# Patient Record
Sex: FEMALE | Race: WHITE | NOT HISPANIC OR LATINO | Employment: OTHER | URBAN - METROPOLITAN AREA
[De-identification: names, ages, dates, MRNs, and addresses within clinical notes are randomized per-mention and may not be internally consistent; named-entity substitution may affect disease eponyms.]

---

## 2019-02-18 PROBLEM — I50.9 ACUTE HEART FAILURE: Status: ACTIVE | Noted: 2019-02-18

## 2019-02-19 ENCOUNTER — HOSPITAL ENCOUNTER (INPATIENT)
Facility: HOSPITAL | Age: 79
LOS: 17 days | Discharge: HOME HEALTH | DRG: 291 | End: 2019-03-08
Attending: INTERNAL MEDICINE | Admitting: INTERNAL MEDICINE
Payer: MEDICARE

## 2019-02-19 DIAGNOSIS — R00.1 BRADYCARDIA: ICD-10-CM

## 2019-02-19 DIAGNOSIS — I50.33 ACUTE ON CHRONIC DIASTOLIC CONGESTIVE HEART FAILURE, NYHA CLASS 3: ICD-10-CM

## 2019-02-19 DIAGNOSIS — R09.02 HYPOXIA: ICD-10-CM

## 2019-02-19 DIAGNOSIS — J96.01 ACUTE RESPIRATORY FAILURE WITH HYPOXIA AND HYPERCARBIA: ICD-10-CM

## 2019-02-19 DIAGNOSIS — J96.21 ACUTE ON CHRONIC RESPIRATORY FAILURE WITH HYPOXIA: ICD-10-CM

## 2019-02-19 DIAGNOSIS — J96.02 ACUTE RESPIRATORY FAILURE WITH HYPOXIA AND HYPERCARBIA: ICD-10-CM

## 2019-02-19 DIAGNOSIS — R07.89 CHEST TIGHTNESS: ICD-10-CM

## 2019-02-19 DIAGNOSIS — I50.9 ACUTE HEART FAILURE: ICD-10-CM

## 2019-02-19 DIAGNOSIS — I50.9 CHF (CONGESTIVE HEART FAILURE): ICD-10-CM

## 2019-02-19 DIAGNOSIS — R58 BLEEDING: ICD-10-CM

## 2019-02-19 DIAGNOSIS — R00.0 TACHYARRHYTHMIA: ICD-10-CM

## 2019-02-19 DIAGNOSIS — M19.90 ARTHRITIS: ICD-10-CM

## 2019-02-19 DIAGNOSIS — I42.1 HOCM (HYPERTROPHIC OBSTRUCTIVE CARDIOMYOPATHY): Primary | ICD-10-CM

## 2019-02-19 DIAGNOSIS — G47.33 OSA ON CPAP: ICD-10-CM

## 2019-02-19 DIAGNOSIS — I27.20 PULMONARY HTN: ICD-10-CM

## 2019-02-19 PROBLEM — J90 PLEURAL EFFUSION, BILATERAL: Status: ACTIVE | Noted: 2019-02-19

## 2019-02-19 PROBLEM — J18.9 PNA (PNEUMONIA): Status: ACTIVE | Noted: 2019-02-19

## 2019-02-19 PROBLEM — I27.82 CHRONIC PULMONARY EMBOLISM: Status: ACTIVE | Noted: 2019-02-19

## 2019-02-19 PROBLEM — E11.9 TYPE 2 DIABETES MELLITUS: Status: ACTIVE | Noted: 2019-02-19

## 2019-02-19 PROBLEM — E03.9 HYPOTHYROIDISM: Status: ACTIVE | Noted: 2019-02-19

## 2019-02-19 PROBLEM — J96.20 ACUTE ON CHRONIC RESPIRATORY FAILURE: Status: ACTIVE | Noted: 2019-02-19

## 2019-02-19 LAB
ALBUMIN SERPL BCP-MCNC: 3.1 G/DL
ALLENS TEST: ABNORMAL
ALP SERPL-CCNC: 76 U/L
ALT SERPL W/O P-5'-P-CCNC: 16 U/L
ANION GAP SERPL CALC-SCNC: 7 MMOL/L
ASCENDING AORTA: 3.09 CM
AST SERPL-CCNC: 12 U/L
BASOPHILS # BLD AUTO: 0.06 K/UL
BASOPHILS NFR BLD: 0.6 %
BILIRUB SERPL-MCNC: 0.4 MG/DL
BNP SERPL-MCNC: 494 PG/ML
BSA FOR ECHO PROCEDURE: 1.97 M2
BUN SERPL-MCNC: 28 MG/DL
CALCIUM SERPL-MCNC: 9.8 MG/DL
CHLORIDE SERPL-SCNC: 105 MMOL/L
CO2 SERPL-SCNC: 25 MMOL/L
CREAT SERPL-MCNC: 0.9 MG/DL
CV ECHO LV RWT: 0.46 CM
DELSYS: ABNORMAL
DIFFERENTIAL METHOD: ABNORMAL
DOP CALC LVOT AREA: 3.46 CM2
DOP CALC LVOT DIAMETER: 2.1 CM
E WAVE DECELERATION TIME: 229.34 MSEC
E/A RATIO: 1.46
E/E' RATIO: 28.31
ECHO LV POSTERIOR WALL: 1.07 CM (ref 0.6–1.1)
EOSINOPHIL # BLD AUTO: 0.4 K/UL
EOSINOPHIL NFR BLD: 3.5 %
ERYTHROCYTE [DISTWIDTH] IN BLOOD BY AUTOMATED COUNT: 16.2 %
EST. GFR  (AFRICAN AMERICAN): >60 ML/MIN/1.73 M^2
EST. GFR  (NON AFRICAN AMERICAN): >60 ML/MIN/1.73 M^2
ESTIMATED AVG GLUCOSE: 114 MG/DL
FRACTIONAL SHORTENING: 29 % (ref 28–44)
GLUCOSE SERPL-MCNC: 119 MG/DL
HBA1C MFR BLD HPLC: 5.6 %
HCO3 UR-SCNC: 22.8 MMOL/L (ref 24–28)
HCT VFR BLD AUTO: 28.5 %
HGB BLD-MCNC: 8.4 G/DL
IMM GRANULOCYTES # BLD AUTO: 0.04 K/UL
IMM GRANULOCYTES NFR BLD AUTO: 0.4 %
INTERVENTRICULAR SEPTUM: 2.26 CM (ref 0.6–1.1)
LA MAJOR: 5.1 CM
LA MINOR: 5.08 CM
LA WIDTH: 5.07 CM
LACTATE SERPL-SCNC: 1.4 MMOL/L
LEFT ATRIUM SIZE: 6.26 CM
LEFT ATRIUM VOLUME INDEX: 71.6 ML/M2
LEFT ATRIUM VOLUME: 137.31 CM3
LEFT INTERNAL DIMENSION IN SYSTOLE: 3.3 CM (ref 2.1–4)
LEFT VENTRICLE DIASTOLIC VOLUME INDEX: 51.66 ML/M2
LEFT VENTRICLE DIASTOLIC VOLUME: 99.09 ML
LEFT VENTRICLE MASS INDEX: 176.6 G/M2
LEFT VENTRICLE SYSTOLIC VOLUME INDEX: 23 ML/M2
LEFT VENTRICLE SYSTOLIC VOLUME: 44.14 ML
LEFT VENTRICULAR INTERNAL DIMENSION IN DIASTOLE: 4.64 CM (ref 3.5–6)
LEFT VENTRICULAR MASS: 338.7 G
LV LATERAL E/E' RATIO: 23
LV SEPTAL E/E' RATIO: 36.8
LYMPHOCYTES # BLD AUTO: 0.9 K/UL
LYMPHOCYTES NFR BLD: 9.3 %
MAGNESIUM SERPL-MCNC: 2.2 MG/DL
MCH RBC QN AUTO: 28.3 PG
MCHC RBC AUTO-ENTMCNC: 29.5 G/DL
MCV RBC AUTO: 96 FL
MONOCYTES # BLD AUTO: 0.9 K/UL
MONOCYTES NFR BLD: 9 %
MV PEAK A VEL: 1.26 M/S
MV PEAK E VEL: 1.84 M/S
NEUTROPHILS # BLD AUTO: 7.8 K/UL
NEUTROPHILS NFR BLD: 77.2 %
NRBC BLD-RTO: 0 /100 WBC
PCO2 BLDA: 32.3 MMHG (ref 35–45)
PH SMN: 7.46 [PH] (ref 7.35–7.45)
PISA TR MAX VEL: 3.19 M/S
PLATELET # BLD AUTO: 240 K/UL
PMV BLD AUTO: 10.9 FL
PO2 BLDA: 111 MMHG (ref 80–100)
POC BE: -1 MMOL/L
POC SATURATED O2: 99 % (ref 95–100)
POC TCO2: 24 MMOL/L (ref 23–27)
POCT GLUCOSE: 168 MG/DL (ref 70–110)
POCT GLUCOSE: 180 MG/DL (ref 70–110)
POTASSIUM SERPL-SCNC: 4.6 MMOL/L
PROT SERPL-MCNC: 6.8 G/DL
PULM VEIN S/D RATIO: 2.1
PV PEAK D VEL: 0.21 M/S
PV PEAK S VEL: 0.44 M/S
RA MAJOR: 4.35 CM
RA PRESSURE: 8 MMHG
RA WIDTH: 3.47 CM
RBC # BLD AUTO: 2.97 M/UL
RIGHT VENTRICULAR END-DIASTOLIC DIMENSION: 3.56 CM
RV TISSUE DOPPLER FREE WALL SYSTOLIC VELOCITY 1 (APICAL 4 CHAMBER VIEW): 16.3 M/S
SAMPLE: ABNORMAL
SINUS: 3.08 CM
SITE: ABNORMAL
SODIUM SERPL-SCNC: 137 MMOL/L
STJ: 2.88 CM
TDI LATERAL: 0.08
TDI SEPTAL: 0.05
TDI: 0.07
TR MAX PG: 40.7 MMHG
TRICUSPID ANNULAR PLANE SYSTOLIC EXCURSION: 1.98 CM
TROPONIN I SERPL DL<=0.01 NG/ML-MCNC: 0.07 NG/ML
TV REST PULMONARY ARTERY PRESSURE: 49 MMHG
WBC # BLD AUTO: 10.11 K/UL

## 2019-02-19 PROCEDURE — 93010 ELECTROCARDIOGRAM REPORT: CPT | Mod: ,,, | Performed by: INTERNAL MEDICINE

## 2019-02-19 PROCEDURE — 27000221 HC OXYGEN, UP TO 24 HOURS

## 2019-02-19 PROCEDURE — 94761 N-INVAS EAR/PLS OXIMETRY MLT: CPT

## 2019-02-19 PROCEDURE — 25000003 PHARM REV CODE 250: Performed by: STUDENT IN AN ORGANIZED HEALTH CARE EDUCATION/TRAINING PROGRAM

## 2019-02-19 PROCEDURE — 83880 ASSAY OF NATRIURETIC PEPTIDE: CPT

## 2019-02-19 PROCEDURE — 25000003 PHARM REV CODE 250: Performed by: INTERNAL MEDICINE

## 2019-02-19 PROCEDURE — 83605 ASSAY OF LACTIC ACID: CPT

## 2019-02-19 PROCEDURE — 99223 1ST HOSP IP/OBS HIGH 75: CPT | Mod: AI,GC,, | Performed by: INTERNAL MEDICINE

## 2019-02-19 PROCEDURE — 36415 COLL VENOUS BLD VENIPUNCTURE: CPT

## 2019-02-19 PROCEDURE — 93005 ELECTROCARDIOGRAM TRACING: CPT

## 2019-02-19 PROCEDURE — 99223 PR INITIAL HOSPITAL CARE,LEVL III: ICD-10-PCS | Mod: AI,GC,, | Performed by: INTERNAL MEDICINE

## 2019-02-19 PROCEDURE — 63600175 PHARM REV CODE 636 W HCPCS: Performed by: STUDENT IN AN ORGANIZED HEALTH CARE EDUCATION/TRAINING PROGRAM

## 2019-02-19 PROCEDURE — 83735 ASSAY OF MAGNESIUM: CPT

## 2019-02-19 PROCEDURE — 25000242 PHARM REV CODE 250 ALT 637 W/ HCPCS: Performed by: STUDENT IN AN ORGANIZED HEALTH CARE EDUCATION/TRAINING PROGRAM

## 2019-02-19 PROCEDURE — 93010 EKG 12-LEAD: ICD-10-PCS | Mod: ,,, | Performed by: INTERNAL MEDICINE

## 2019-02-19 PROCEDURE — 36600 WITHDRAWAL OF ARTERIAL BLOOD: CPT

## 2019-02-19 PROCEDURE — 84484 ASSAY OF TROPONIN QUANT: CPT

## 2019-02-19 PROCEDURE — 99900035 HC TECH TIME PER 15 MIN (STAT)

## 2019-02-19 PROCEDURE — 11000001 HC ACUTE MED/SURG PRIVATE ROOM

## 2019-02-19 PROCEDURE — 83036 HEMOGLOBIN GLYCOSYLATED A1C: CPT

## 2019-02-19 PROCEDURE — 94660 CPAP INITIATION&MGMT: CPT

## 2019-02-19 PROCEDURE — 80053 COMPREHEN METABOLIC PANEL: CPT

## 2019-02-19 PROCEDURE — 94640 AIRWAY INHALATION TREATMENT: CPT

## 2019-02-19 PROCEDURE — 82803 BLOOD GASES ANY COMBINATION: CPT

## 2019-02-19 PROCEDURE — 85025 COMPLETE CBC W/AUTO DIFF WBC: CPT

## 2019-02-19 RX ORDER — ALPRAZOLAM 0.5 MG/1
0.5 TABLET ORAL NIGHTLY PRN
Status: ON HOLD | COMMUNITY
End: 2019-03-08 | Stop reason: SDUPTHER

## 2019-02-19 RX ORDER — FUROSEMIDE 40 MG/1
40 TABLET ORAL 2 TIMES DAILY
COMMUNITY

## 2019-02-19 RX ORDER — LEVOTHYROXINE SODIUM 112 UG/1
112 TABLET ORAL
Status: DISCONTINUED | OUTPATIENT
Start: 2019-02-20 | End: 2019-03-08 | Stop reason: HOSPADM

## 2019-02-19 RX ORDER — IBUPROFEN 200 MG
24 TABLET ORAL
Status: DISCONTINUED | OUTPATIENT
Start: 2019-02-19 | End: 2019-03-08 | Stop reason: HOSPADM

## 2019-02-19 RX ORDER — FLUTICASONE FUROATE AND VILANTEROL 200; 25 UG/1; UG/1
1 POWDER RESPIRATORY (INHALATION) DAILY
Status: DISCONTINUED | OUTPATIENT
Start: 2019-02-20 | End: 2019-03-08 | Stop reason: HOSPADM

## 2019-02-19 RX ORDER — ALPRAZOLAM 0.5 MG/1
0.5 TABLET ORAL NIGHTLY PRN
Status: DISCONTINUED | OUTPATIENT
Start: 2019-02-19 | End: 2019-02-23

## 2019-02-19 RX ORDER — MONTELUKAST SODIUM 10 MG/1
10 TABLET ORAL NIGHTLY
Status: DISCONTINUED | OUTPATIENT
Start: 2019-02-19 | End: 2019-03-08 | Stop reason: HOSPADM

## 2019-02-19 RX ORDER — HYDROCODONE BITARTRATE AND ACETAMINOPHEN 5; 325 MG/1; MG/1
1 TABLET ORAL DAILY
Status: DISCONTINUED | OUTPATIENT
Start: 2019-02-19 | End: 2019-02-19

## 2019-02-19 RX ORDER — ENOXAPARIN SODIUM 100 MG/ML
1 INJECTION SUBCUTANEOUS
Status: DISCONTINUED | OUTPATIENT
Start: 2019-02-19 | End: 2019-02-21

## 2019-02-19 RX ORDER — GLUCAGON 1 MG
1 KIT INJECTION
Status: DISCONTINUED | OUTPATIENT
Start: 2019-02-19 | End: 2019-03-08 | Stop reason: HOSPADM

## 2019-02-19 RX ORDER — BUDESONIDE 0.5 MG/2ML
0.5 INHALANT ORAL EVERY 12 HOURS
Status: DISCONTINUED | OUTPATIENT
Start: 2019-02-19 | End: 2019-02-19

## 2019-02-19 RX ORDER — HYDROCODONE BITARTRATE AND ACETAMINOPHEN 5; 325 MG/1; MG/1
1 TABLET ORAL 2 TIMES DAILY PRN
Status: DISCONTINUED | OUTPATIENT
Start: 2019-02-19 | End: 2019-02-19

## 2019-02-19 RX ORDER — FLUTICASONE PROPIONATE 50 MCG
1 SPRAY, SUSPENSION (ML) NASAL DAILY
Status: DISCONTINUED | OUTPATIENT
Start: 2019-02-19 | End: 2019-03-08 | Stop reason: HOSPADM

## 2019-02-19 RX ORDER — TIOTROPIUM BROMIDE 18 UG/1
1 CAPSULE ORAL; RESPIRATORY (INHALATION) DAILY
Status: DISCONTINUED | OUTPATIENT
Start: 2019-02-19 | End: 2019-03-08 | Stop reason: HOSPADM

## 2019-02-19 RX ORDER — INSULIN ASPART 100 [IU]/ML
0-5 INJECTION, SOLUTION INTRAVENOUS; SUBCUTANEOUS
Status: DISCONTINUED | OUTPATIENT
Start: 2019-02-19 | End: 2019-03-08 | Stop reason: HOSPADM

## 2019-02-19 RX ORDER — VERAPAMIL HYDROCHLORIDE 40 MG/1
40 TABLET ORAL 3 TIMES DAILY
Status: DISCONTINUED | OUTPATIENT
Start: 2019-02-19 | End: 2019-02-22

## 2019-02-19 RX ORDER — IPRATROPIUM BROMIDE AND ALBUTEROL SULFATE 2.5; .5 MG/3ML; MG/3ML
3 SOLUTION RESPIRATORY (INHALATION) EVERY 6 HOURS
Status: DISCONTINUED | OUTPATIENT
Start: 2019-02-19 | End: 2019-02-28

## 2019-02-19 RX ORDER — HYDROCODONE BITARTRATE AND ACETAMINOPHEN 5; 325 MG/1; MG/1
2 TABLET ORAL 2 TIMES DAILY PRN
Status: DISCONTINUED | OUTPATIENT
Start: 2019-02-19 | End: 2019-02-20

## 2019-02-19 RX ORDER — METOPROLOL TARTRATE 50 MG/1
50 TABLET ORAL 2 TIMES DAILY
Status: COMPLETED | OUTPATIENT
Start: 2019-02-19 | End: 2019-02-27

## 2019-02-19 RX ORDER — GABAPENTIN 100 MG/1
100 CAPSULE ORAL 2 TIMES DAILY
Status: DISCONTINUED | OUTPATIENT
Start: 2019-02-19 | End: 2019-03-08 | Stop reason: HOSPADM

## 2019-02-19 RX ORDER — IBUPROFEN 200 MG
16 TABLET ORAL
Status: DISCONTINUED | OUTPATIENT
Start: 2019-02-19 | End: 2019-03-08 | Stop reason: HOSPADM

## 2019-02-19 RX ADMIN — IPRATROPIUM BROMIDE AND ALBUTEROL SULFATE 3 ML: .5; 3 SOLUTION RESPIRATORY (INHALATION) at 11:02

## 2019-02-19 RX ADMIN — MONTELUKAST SODIUM 10 MG: 10 TABLET, FILM COATED ORAL at 09:02

## 2019-02-19 RX ADMIN — VERAPAMIL HYDROCHLORIDE 40 MG: 40 TABLET ORAL at 09:02

## 2019-02-19 RX ADMIN — TIOTROPIUM BROMIDE 18 MCG: 18 CAPSULE ORAL; RESPIRATORY (INHALATION) at 06:02

## 2019-02-19 RX ADMIN — IPRATROPIUM BROMIDE AND ALBUTEROL SULFATE 3 ML: .5; 3 SOLUTION RESPIRATORY (INHALATION) at 04:02

## 2019-02-19 RX ADMIN — ENOXAPARIN SODIUM 90 MG: 100 INJECTION SUBCUTANEOUS at 06:02

## 2019-02-19 RX ADMIN — HYDROCODONE BITARTRATE AND ACETAMINOPHEN 1 TABLET: 5; 325 TABLET ORAL at 03:02

## 2019-02-19 RX ADMIN — METOPROLOL TARTRATE 50 MG: 50 TABLET ORAL at 10:02

## 2019-02-19 RX ADMIN — FLUTICASONE PROPIONATE 50 MCG: 50 SPRAY, METERED NASAL at 03:02

## 2019-02-19 RX ADMIN — GABAPENTIN 100 MG: 100 CAPSULE ORAL at 09:02

## 2019-02-19 RX ADMIN — ALPRAZOLAM 0.5 MG: 0.5 TABLET ORAL at 09:02

## 2019-02-19 NOTE — ASSESSMENT & PLAN NOTE
· Hypertrophic cardiomyopathy with asymmetric septal hypertrophy  · Systolic anterior motion of the mitral valve with severe outflow tract left ventricular obstruction. The peak gradient is near 100mm Hg.      - start BB and CCB  - will need outpatient ablation

## 2019-02-19 NOTE — ASSESSMENT & PLAN NOTE
pulmonary artery systolic pressure (PASP)= 49  Mean PAP can be approximated because mPAP = 0.61sPAP + 2  Mean PAP= 31.89 (32)    MILD Pulmonary HTN

## 2019-02-19 NOTE — SUBJECTIVE & OBJECTIVE
Past Medical History:   Diagnosis Date    Anemia     Anxiety     Arthritis     Asthma     Atrial fibrillation     Cancer     Cataract     Clotting disorder     COPD (chronic obstructive pulmonary disease)     Coronary artery disease     Degenerative disc disease at L5-S1 level     Depression     Glaucoma     Heart murmur     History of stomach ulcers     Hypertension     Myocardial infarction     Neuromuscular disorder     Thyroid disease     Tuberculosis        Past Surgical History:   Procedure Laterality Date    CHOLECYSTECTOMY      COLON SURGERY      EYE SURGERY      TONSILLECTOMY         Review of patient's allergies indicates:   Allergen Reactions    Augmentin [amoxicillin-pot clavulanate]     Cosopt [dorzolamide-timolol]     Cymbalta [duloxetine]     Isordil [isosorbide dinitrate]     Procardia [nifedipine]        No current facility-administered medications on file prior to encounter.      Current Outpatient Medications on File Prior to Encounter   Medication Sig    albuterol 90 mcg/actuation inhaler Inhale 2 puffs into the lungs every 6 (six) hours as needed for Wheezing.    albuterol-ipratropium 2.5mg-0.5mg/3mL (DUO-NEB) 0.5 mg-3 mg(2.5 mg base)/3 mL nebulizer solution Take 3 mLs by nebulization every 6 (six) hours as needed for Wheezing.    ALPRAZolam (XANAX) 0.5 MG tablet Take 0.5 mg by mouth nightly as needed for Insomnia or Anxiety (SOB/anxiety/rest at night).    azilsartan medoxomil 80 mg Tab Take by mouth.    furosemide (LASIX) 40 MG tablet Take 40 mg by mouth 2 (two) times daily.    gabapentin (NEURONTIN) 100 MG capsule Take 100 mg by mouth 2 (two) times daily.    hydrocodone-acetaminophen 10-325mg (NORCO)  mg Tab Take by mouth every 6 (six) hours.    iron-vitamin C 100-250 mg, ICAR-C, 100-250 mg Tab Take by mouth.    levothyroxine (SYNTHROID) 112 MCG tablet Take 112 mcg by mouth once daily.    lidocaine (LIDODERM) 5 %(700 mg/patch) Place 1 patch onto the  skin every 24 hours. Remove & Discard patch within 12 hours or as directed by MD    metformin (GLUCOPHAGE) 1000 MG tablet Take 1,000 mg by mouth 2 (two) times daily with meals.    metoprolol tartrate (LOPRESSOR) 50 MG tablet Take 50 mg by mouth 2 (two) times daily.    montelukast (SINGULAIR) 10 mg tablet Take 10 mg by mouth every evening.    potassium chloride (KLOR-CON) 20 mEq Pack Take 20 mEq by mouth 2 (two) times daily.    senna-docusate 8.6-50 mg (PERICOLACE) 8.6-50 mg per tablet Take 1 tablet by mouth once daily.    chlorthalidone (HYGROTEN) 25 MG Tab Take 25 mg by mouth once daily.    diltiazem (CARDIZEM) 30 MG tablet Take 30 mg by mouth 4 (four) times daily.    doxycycline (VIBRA-TABS) 100 MG tablet Take 100 mg by mouth 2 (two) times daily.    fluticasone (FLONASE) 50 mcg/actuation nasal spray 1 spray by Each Nare route once daily.    venlafaxine (EFFEXOR-XR) 37.5 MG 24 hr capsule Take 37.5 mg by mouth once daily.     Family History     None        Tobacco Use    Smoking status: Former Smoker     Last attempt to quit: 1/14/2016     Years since quitting: 3.1   Substance and Sexual Activity    Alcohol use: No     Alcohol/week: 0.0 oz    Drug use: No    Sexual activity: Not on file     Review of Systems   Constitution: Positive for malaise/fatigue. Negative for chills, fever and night sweats.   HENT: Positive for nosebleeds. Negative for hoarse voice.    Eyes: Negative for visual disturbance and visual halos.   Cardiovascular: Positive for chest pain (chest tightness ) and dyspnea on exertion (and at rest). Negative for syncope.   Respiratory: Positive for cough, shortness of breath and wheezing. Negative for hemoptysis (resolved ) and sputum production.    Endocrine: Negative for polydipsia, polyphagia and polyuria.   Hematologic/Lymphatic: Positive for bleeding problem. Does not bruise/bleed easily.   Musculoskeletal: Negative for joint pain and joint swelling.   Gastrointestinal: Negative for  abdominal pain, constipation, diarrhea, melena, nausea and vomiting.   Genitourinary: Negative for flank pain and hematuria.   Neurological: Negative for numbness and seizures.   Psychiatric/Behavioral: Negative for memory loss. The patient does not have insomnia.      Objective:     Vital Signs (Most Recent):  Temp: 97.7 °F (36.5 °C) (02/19/19 0848)  Pulse: 91 (02/19/19 1318)  Resp: 20 (02/19/19 1318)  BP: 130/72 (02/19/19 1318)  SpO2: 99 % (02/19/19 1318) Vital Signs (24h Range):  Temp:  [97.7 °F (36.5 °C)-99.1 °F (37.3 °C)] 97.7 °F (36.5 °C)  Pulse:  [66-91] 91  Resp:  [14-22] 20  SpO2:  [98 %-100 %] 99 %  BP: (112-131)/(45-72) 130/72     Weight: 92.5 kg (204 lb)  Body mass index is 33.95 kg/m².    SpO2: 99 %  O2 Device (Oxygen Therapy): nasal cannula    No intake or output data in the 24 hours ending 02/19/19 1438    Lines/Drains/Airways          None          Physical Exam   Constitutional: She is oriented to person, place, and time. She has a sickly appearance.   Patient not in respiratory distress. However, develops SOB w/ minimal movement in bed.    HENT:   Head: Normocephalic and atraumatic.   Eyes: Conjunctivae are normal. Pupils are equal, round, and reactive to light.   Neck: Neck supple. No JVD present.   Cardiovascular: Normal rate.   Murmur heard.  Pulmonary/Chest: Effort normal. No respiratory distress. She has decreased breath sounds in the right middle field and the right lower field. She has no wheezes.   Abdominal: Soft. Bowel sounds are normal. There is no tenderness. There is no rebound.   Musculoskeletal: She exhibits no edema or deformity.   Neurological: She is alert and oriented to person, place, and time.   Skin: Skin is warm. No erythema.       Significant Labs: All pertinent lab results from the last 24 hours have been reviewed.    Significant Imaging: reviewed

## 2019-02-19 NOTE — ASSESSMENT & PLAN NOTE
Echo 2/19/2019  · Hypertrophic cardiomyopathy with asymmetric septal hypertrophy  · Normal left ventricular systolic function. The estimated ejection fraction is 65%  · Normal right ventricular systolic function.  · Severe left atrial enlargement.  · Systolic anterior motion of the mitral valve with severe outflow tract left ventricular obstruction. The peak gradient is near 100mm Hg.  · The estimated PA systolic pressure is 49 mm Hg  · Intermediate central venous pressure (8 mm Hg).    - will hold off on diuresis; pt is preload dependent   - start BB and CCB

## 2019-02-19 NOTE — PROGRESS NOTES
Pt arrived to room from [facility] in [place].  Pagened on call with Cardiology to notify of pt's arrival.  No orders in place at this time.  VSS, afebrile an no c/o pain except for some soreness to sacrum from sitting on ambulance stretcher for the long ride.  Wcm.

## 2019-02-19 NOTE — HPI
Ms. 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     Per OSH records:  Pt presented on 2/5/29 via EMS for respiratory distress despite supplemental oxygen. Pt reports increased SOB over the last 4-5 days. On the day of admission significant SOB despite inhale and changing from oxygen compressor to tank. SpO2 75% on 3.5 L at home. Triage noted patient's SpO2 69% on 3L NC. Patient was placed on BiPaP w/ improvement in her respiratory function. CTA chest 2/7/19: without evidence of PE; moderate bilateral pleural effusions; RML consolidation. Thoracentesis 2/9/19: consistent with transudate. (thoracentesis 12/18 negative cytology). Patient was treated w/ antibiotics. Echo December 2018, IHSS, elevated peak gradient across LVOT at 179mmHg and a mean of 94mmHg. FELICIA performed. Patient remained in the hospital till 2/19/19; during which she was diuresed ~15L, started on BB yet remained symptomatic. And decision was made to transfer patient to [facility].

## 2019-02-19 NOTE — ASSESSMENT & PLAN NOTE
Patient acute respiratory failure is multifactorial secondary to: moderate COPD, pulmonary HTN (PA 49), HOCM, PE, MARISOL (CPAP at 4), bilateral pulmonary effusion, hx of RUL cancer s/p chemo&XRT, recently tx for CAP zosyn x 12 days     - treated for PNA at OSH, no signs of infection. No further txt required   - per OSH records patient not tolerating CPAP at night, would get hypoxic and was switched over to BiPAP  - will continue BiPAP 12/8, and obtain ABG in the morning; expecting patient to be d/c on BiPAP

## 2019-02-19 NOTE — H&P
[facility]  Cardiology  History and Physical     Patient Name: Makenzie Leija  MRN: [id number]  Admission Date: 2/19/2019  Code Status: Full Code   Attending Provider: Paramjit Bales MD   Primary Care Physician: Karthik Roth MD  Principal Problem:Acute on chronic respiratory failure    Patient information was obtained from patient and past medical records.     Subjective:     Chief Complaint:  HOCM & Acute on Chronic Resp Distress      HPI:  Ms. 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     Per OSH records:  Pt presented on 2/5/29 via EMS for respiratory distress despite supplemental oxygen. Pt reports increased SOB over the last 4-5 days. On the day of admission significant SOB despite inhale and changing from oxygen compressor to tank. SpO2 75% on 3.5 L at home. Triage noted patient's SpO2 69% on 3L NC. Patient was placed on BiPaP w/ improvement in her respiratory function. CTA chest 2/7/19: without evidence of PE; moderate bilateral pleural effusions; RML consolidation. Thoracentesis 2/9/19: consistent with transudate. (thoracentesis 12/18 negative cytology). Patient was treated w/ antibiotics. Echo December 2018, IHSS, elevated peak gradient across LVOT at 179mmHg and a mean of 94mmHg. FELICIA performed. Patient remained in the hospital till 2/19/19; during which she was diuresed ~15L, started on BB yet remained symptomatic. And decision was made to transfer patient [place] [facility].         Past Medical History:   Diagnosis Date    Anemia     Anxiety     Arthritis      Asthma     Atrial fibrillation     Cancer     Cataract     Clotting disorder     COPD (chronic obstructive pulmonary disease)     Coronary artery disease     Degenerative disc disease at L5-S1 level     Depression     Glaucoma     Heart murmur     History of stomach ulcers     Hypertension     Myocardial infarction     Neuromuscular disorder     Thyroid disease     Tuberculosis        Past Surgical History:   Procedure Laterality Date    CHOLECYSTECTOMY      COLON SURGERY      EYE SURGERY      TONSILLECTOMY         Review of patient's allergies indicates:   Allergen Reactions    Augmentin [amoxicillin-pot clavulanate]     Cosopt [dorzolamide-timolol]     Cymbalta [duloxetine]     Isordil [isosorbide dinitrate]     Procardia [nifedipine]        No current facility-administered medications on file prior to encounter.      Current Outpatient Medications on File Prior to Encounter   Medication Sig    albuterol 90 mcg/actuation inhaler Inhale 2 puffs into the lungs every 6 (six) hours as needed for Wheezing.    albuterol-ipratropium 2.5mg-0.5mg/3mL (DUO-NEB) 0.5 mg-3 mg(2.5 mg base)/3 mL nebulizer solution Take 3 mLs by nebulization every 6 (six) hours as needed for Wheezing.    ALPRAZolam (XANAX) 0.5 MG tablet Take 0.5 mg by mouth nightly as needed for Insomnia or Anxiety (SOB/anxiety/rest at night).    azilsartan medoxomil 80 mg Tab Take by mouth.    furosemide (LASIX) 40 MG tablet Take 40 mg by mouth 2 (two) times daily.    gabapentin (NEURONTIN) 100 MG capsule Take 100 mg by mouth 2 (two) times daily.    hydrocodone-acetaminophen 10-325mg (NORCO)  mg Tab Take by mouth every 6 (six) hours.    iron-vitamin C 100-250 mg, ICAR-C, 100-250 mg Tab Take by mouth.    levothyroxine (SYNTHROID) 112 MCG tablet Take 112 mcg by mouth once daily.    lidocaine (LIDODERM) 5 %(700 mg/patch) Place 1 patch onto the skin every 24 hours. Remove & Discard patch within 12 hours or as directed by  MD    metformin (GLUCOPHAGE) 1000 MG tablet Take 1,000 mg by mouth 2 (two) times daily with meals.    metoprolol tartrate (LOPRESSOR) 50 MG tablet Take 50 mg by mouth 2 (two) times daily.    montelukast (SINGULAIR) 10 mg tablet Take 10 mg by mouth every evening.    potassium chloride (KLOR-CON) 20 mEq Pack Take 20 mEq by mouth 2 (two) times daily.    senna-docusate 8.6-50 mg (PERICOLACE) 8.6-50 mg per tablet Take 1 tablet by mouth once daily.    chlorthalidone (HYGROTEN) 25 MG Tab Take 25 mg by mouth once daily.    diltiazem (CARDIZEM) 30 MG tablet Take 30 mg by mouth 4 (four) times daily.    doxycycline (VIBRA-TABS) 100 MG tablet Take 100 mg by mouth 2 (two) times daily.    fluticasone (FLONASE) 50 mcg/actuation nasal spray 1 spray by Each Nare route once daily.    venlafaxine (EFFEXOR-XR) 37.5 MG 24 hr capsule Take 37.5 mg by mouth once daily.     Family History     None        Tobacco Use    Smoking status: Former Smoker     Last attempt to quit: 1/14/2016     Years since quitting: 3.1   Substance and Sexual Activity    Alcohol use: No     Alcohol/week: 0.0 oz    Drug use: No    Sexual activity: Not on file     Review of Systems   Constitution: Positive for malaise/fatigue. Negative for chills, fever and night sweats.   HENT: Positive for nosebleeds. Negative for hoarse voice.    Eyes: Negative for visual disturbance and visual halos.   Cardiovascular: Positive for chest pain (chest tightness ) and dyspnea on exertion (and at rest). Negative for syncope.   Respiratory: Positive for cough, shortness of breath and wheezing. Negative for hemoptysis (resolved ) and sputum production.    Endocrine: Negative for polydipsia, polyphagia and polyuria.   Hematologic/Lymphatic: Positive for bleeding problem. Does not bruise/bleed easily.   Musculoskeletal: Negative for joint pain and joint swelling.   Gastrointestinal: Negative for abdominal pain, constipation, diarrhea, melena, nausea and vomiting.    Genitourinary: Negative for flank pain and hematuria.   Neurological: Negative for numbness and seizures.   Psychiatric/Behavioral: Negative for memory loss. The patient does not have insomnia.      Objective:     Vital Signs (Most Recent):  Temp: 97.7 °F (36.5 °C) (02/19/19 0848)  Pulse: 91 (02/19/19 1318)  Resp: 20 (02/19/19 1318)  BP: 130/72 (02/19/19 1318)  SpO2: 99 % (02/19/19 1318) Vital Signs (24h Range):  Temp:  [97.7 °F (36.5 °C)-99.1 °F (37.3 °C)] 97.7 °F (36.5 °C)  Pulse:  [66-91] 91  Resp:  [14-22] 20  SpO2:  [98 %-100 %] 99 %  BP: (112-131)/(45-72) 130/72     Weight: 92.5 kg (204 lb)  Body mass index is 33.95 kg/m².    SpO2: 99 %  O2 Device (Oxygen Therapy): nasal cannula    No intake or output data in the 24 hours ending 02/19/19 1438    Lines/Drains/Airways          None          Physical Exam   Constitutional: She is oriented to person, place, and time. She has a sickly appearance.   Patient not in respiratory distress. However, develops SOB w/ minimal movement in bed.    HENT:   Head: Normocephalic and atraumatic.   Eyes: Conjunctivae are normal. Pupils are equal, round, and reactive to light.   Neck: Neck supple. No JVD present.   Cardiovascular: Normal rate.   Murmur heard.  Pulmonary/Chest: Effort normal. No respiratory distress. She has decreased breath sounds in the right middle field and the right lower field. She has no wheezes.   Abdominal: Soft. Bowel sounds are normal. There is no tenderness. There is no rebound.   Musculoskeletal: She exhibits no edema or deformity.   Neurological: She is alert and oriented to person, place, and time.   Skin: Skin is warm. No erythema.       Significant Labs: All pertinent lab results from the last 24 hours have been reviewed.    Significant Imaging: reviewed     Assessment and Plan:     * Acute on chronic respiratory failure    Patient acute respiratory failure is multifactorial secondary to: moderate COPD, pulmonary HTN (PA 49), HOCM, PE, MARISOL (CPAP at  4), bilateral pulmonary effusion, hx of RUL cancer s/p chemo&XRT, recently tx for CAP zosyn x 12 days     - treated for PNA at OSH, no signs of infection. No further txt required   - per OSH records patient not tolerating CPAP at night, would get hypoxic and was switched over to BiPAP  - will continue BiPAP 12/8, and obtain ABG in the morning; expecting patient to be d/c on BiPAP      Hypothyroidism    - continue home dose synthroid      Pulmonary HTN    pulmonary artery systolic pressure (PASP)= 49  Mean PAP can be approximated because mPAP = 0.61sPAP + 2  Mean PAP= 31.89 (32)    MILD Pulmonary HTN        HOCM (hypertrophic obstructive cardiomyopathy)    · Hypertrophic cardiomyopathy with asymmetric septal hypertrophy  · Systolic anterior motion of the mitral valve with severe outflow tract left ventricular obstruction. The peak gradient is near 100mm Hg.      - start BB and CCB  - will need outpatient ablation      MARISOL on CPAP    BiPAP qhs 12/8     Type 2 diabetes mellitus    Low dose insulin sliding scale      Pleural effusion, bilateral    Thorac 2/8 consistent w/ transudate (OSH studies)  Thorac 12/2019 cytology negative for malignancy  Bilateral effusions notes on CXR      Chronic pulmonary embolism    Hx of RML embolus diagnosed 12/2018  Start Lovenox 1mg/kg BID  Monitor for bleeding      Acute on chronic diastolic congestive heart failure, NYHA class 3    Echo 2/19/2019  · Hypertrophic cardiomyopathy with asymmetric septal hypertrophy  · Normal left ventricular systolic function. The estimated ejection fraction is 65%  · Normal right ventricular systolic function.  · Severe left atrial enlargement.  · Systolic anterior motion of the mitral valve with severe outflow tract left ventricular obstruction. The peak gradient is near 100mm Hg.  · The estimated PA systolic pressure is 49 mm Hg  · Intermediate central venous pressure (8 mm Hg).    - will hold off on diuresis; pt is preload dependent   - start BB and  CCB       COPD (chronic obstructive pulmonary disease)    Patient fits GOLD Group D/C class  - will start LAMA/ICS/LABA  - consult pulmonary   - duonebs q6 hrs          VTE Risk Mitigation (From admission, onward)        Ordered     enoxaparin injection 90 mg  Every 12 hours (non-standard times)      02/19/19 1510          Gareth Macias MD  Cardiology   [facility]

## 2019-02-19 NOTE — ASSESSMENT & PLAN NOTE
Thorac 2/8 consistent w/ transudate (OSH studies)  Thorac 12/2019 cytology negative for malignancy  Bilateral effusions notes on CXR

## 2019-02-19 NOTE — PROGRESS NOTES
Paged on call with Cardiology CCU  and went to voicemail.   left voicemail for call-back.  Will reattempt to page again later.  Family requesting to speak with Md regarding poc and meds.

## 2019-02-19 NOTE — ASSESSMENT & PLAN NOTE
Patient fits GOLD Group D/C class  - will start LAMA/ICS/LABA  - consult pulmonary   - duonebs q6 hrs

## 2019-02-20 LAB
ALBUMIN SERPL BCP-MCNC: 2.8 G/DL
ALP SERPL-CCNC: 72 U/L
ALT SERPL W/O P-5'-P-CCNC: 11 U/L
ANION GAP SERPL CALC-SCNC: 8 MMOL/L
AST SERPL-CCNC: 10 U/L
BASOPHILS # BLD AUTO: 0.04 K/UL
BASOPHILS NFR BLD: 0.5 %
BILIRUB SERPL-MCNC: 0.5 MG/DL
BUN SERPL-MCNC: 26 MG/DL
CALCIUM SERPL-MCNC: 9.5 MG/DL
CHLORIDE SERPL-SCNC: 106 MMOL/L
CO2 SERPL-SCNC: 24 MMOL/L
CREAT SERPL-MCNC: 1.2 MG/DL
DIFFERENTIAL METHOD: ABNORMAL
EOSINOPHIL # BLD AUTO: 0.2 K/UL
EOSINOPHIL NFR BLD: 2.7 %
ERYTHROCYTE [DISTWIDTH] IN BLOOD BY AUTOMATED COUNT: 16.1 %
EST. GFR  (AFRICAN AMERICAN): 49.7 ML/MIN/1.73 M^2
EST. GFR  (NON AFRICAN AMERICAN): 43.1 ML/MIN/1.73 M^2
FERRITIN SERPL-MCNC: 148 NG/ML
GLUCOSE SERPL-MCNC: 187 MG/DL
HCT VFR BLD AUTO: 24.9 %
HGB BLD-MCNC: 7.5 G/DL
IMM GRANULOCYTES # BLD AUTO: 0.04 K/UL
IMM GRANULOCYTES NFR BLD AUTO: 0.5 %
IRON SERPL-MCNC: 16 UG/DL
LYMPHOCYTES # BLD AUTO: 0.6 K/UL
LYMPHOCYTES NFR BLD: 7.2 %
MAGNESIUM SERPL-MCNC: 1.9 MG/DL
MCH RBC QN AUTO: 28.2 PG
MCHC RBC AUTO-ENTMCNC: 30.1 G/DL
MCV RBC AUTO: 94 FL
MONOCYTES # BLD AUTO: 0.7 K/UL
MONOCYTES NFR BLD: 9.2 %
NEUTROPHILS # BLD AUTO: 6.3 K/UL
NEUTROPHILS NFR BLD: 79.9 %
NRBC BLD-RTO: 0 /100 WBC
PLATELET # BLD AUTO: 215 K/UL
PMV BLD AUTO: 10.4 FL
POCT GLUCOSE: 118 MG/DL (ref 70–110)
POCT GLUCOSE: 160 MG/DL (ref 70–110)
POCT GLUCOSE: 180 MG/DL (ref 70–110)
POCT GLUCOSE: 220 MG/DL (ref 70–110)
POTASSIUM SERPL-SCNC: 4.4 MMOL/L
PROT SERPL-MCNC: 6.3 G/DL
RBC # BLD AUTO: 2.66 M/UL
SATURATED IRON: 5 %
SODIUM SERPL-SCNC: 138 MMOL/L
TOTAL IRON BINDING CAPACITY: 296 UG/DL
TRANSFERRIN SERPL-MCNC: 200 MG/DL
WBC # BLD AUTO: 7.91 K/UL

## 2019-02-20 PROCEDURE — 36415 COLL VENOUS BLD VENIPUNCTURE: CPT

## 2019-02-20 PROCEDURE — 11000001 HC ACUTE MED/SURG PRIVATE ROOM

## 2019-02-20 PROCEDURE — 83735 ASSAY OF MAGNESIUM: CPT

## 2019-02-20 PROCEDURE — 25000003 PHARM REV CODE 250: Performed by: INTERNAL MEDICINE

## 2019-02-20 PROCEDURE — 99223 PR INITIAL HOSPITAL CARE,LEVL III: ICD-10-PCS | Mod: ,,, | Performed by: INTERNAL MEDICINE

## 2019-02-20 PROCEDURE — 25000003 PHARM REV CODE 250: Performed by: STUDENT IN AN ORGANIZED HEALTH CARE EDUCATION/TRAINING PROGRAM

## 2019-02-20 PROCEDURE — 85025 COMPLETE CBC W/AUTO DIFF WBC: CPT

## 2019-02-20 PROCEDURE — 99900035 HC TECH TIME PER 15 MIN (STAT)

## 2019-02-20 PROCEDURE — 25000242 PHARM REV CODE 250 ALT 637 W/ HCPCS: Performed by: STUDENT IN AN ORGANIZED HEALTH CARE EDUCATION/TRAINING PROGRAM

## 2019-02-20 PROCEDURE — 99232 PR SUBSEQUENT HOSPITAL CARE,LEVL II: ICD-10-PCS | Mod: GC,,, | Performed by: INTERNAL MEDICINE

## 2019-02-20 PROCEDURE — 82728 ASSAY OF FERRITIN: CPT

## 2019-02-20 PROCEDURE — 80053 COMPREHEN METABOLIC PANEL: CPT

## 2019-02-20 PROCEDURE — 83540 ASSAY OF IRON: CPT

## 2019-02-20 PROCEDURE — 99223 1ST HOSP IP/OBS HIGH 75: CPT | Mod: ,,, | Performed by: INTERNAL MEDICINE

## 2019-02-20 PROCEDURE — 63600175 PHARM REV CODE 636 W HCPCS: Performed by: STUDENT IN AN ORGANIZED HEALTH CARE EDUCATION/TRAINING PROGRAM

## 2019-02-20 PROCEDURE — 99232 SBSQ HOSP IP/OBS MODERATE 35: CPT | Mod: GC,,, | Performed by: INTERNAL MEDICINE

## 2019-02-20 PROCEDURE — 94640 AIRWAY INHALATION TREATMENT: CPT

## 2019-02-20 PROCEDURE — 94761 N-INVAS EAR/PLS OXIMETRY MLT: CPT

## 2019-02-20 PROCEDURE — 27000221 HC OXYGEN, UP TO 24 HOURS

## 2019-02-20 RX ORDER — ACETYLCYSTEINE 100 MG/ML
4 SOLUTION ORAL; RESPIRATORY (INHALATION)
Status: DISCONTINUED | OUTPATIENT
Start: 2019-02-20 | End: 2019-03-04

## 2019-02-20 RX ORDER — HYDROCODONE BITARTRATE AND ACETAMINOPHEN 5; 325 MG/1; MG/1
1 TABLET ORAL EVERY 8 HOURS PRN
Status: DISCONTINUED | OUTPATIENT
Start: 2019-02-20 | End: 2019-02-28

## 2019-02-20 RX ADMIN — TIOTROPIUM BROMIDE 18 MCG: 18 CAPSULE ORAL; RESPIRATORY (INHALATION) at 09:02

## 2019-02-20 RX ADMIN — MONTELUKAST SODIUM 10 MG: 10 TABLET, FILM COATED ORAL at 09:02

## 2019-02-20 RX ADMIN — VERAPAMIL HYDROCHLORIDE 40 MG: 40 TABLET ORAL at 09:02

## 2019-02-20 RX ADMIN — FLUTICASONE FUROATE AND VILANTEROL TRIFENATATE 1 PUFF: 200; 25 POWDER RESPIRATORY (INHALATION) at 09:02

## 2019-02-20 RX ADMIN — IPRATROPIUM BROMIDE AND ALBUTEROL SULFATE 3 ML: .5; 3 SOLUTION RESPIRATORY (INHALATION) at 08:02

## 2019-02-20 RX ADMIN — METOPROLOL TARTRATE 50 MG: 50 TABLET ORAL at 09:02

## 2019-02-20 RX ADMIN — GABAPENTIN 100 MG: 100 CAPSULE ORAL at 09:02

## 2019-02-20 RX ADMIN — ENOXAPARIN SODIUM 90 MG: 100 INJECTION SUBCUTANEOUS at 05:02

## 2019-02-20 RX ADMIN — IPRATROPIUM BROMIDE AND ALBUTEROL SULFATE 3 ML: .5; 3 SOLUTION RESPIRATORY (INHALATION) at 07:02

## 2019-02-20 RX ADMIN — HYDROCODONE BITARTRATE AND ACETAMINOPHEN 2 TABLET: 5; 325 TABLET ORAL at 05:02

## 2019-02-20 RX ADMIN — SODIUM CHLORIDE 125 MG: 9 INJECTION, SOLUTION INTRAVENOUS at 06:02

## 2019-02-20 RX ADMIN — IPRATROPIUM BROMIDE AND ALBUTEROL SULFATE 3 ML: .5; 3 SOLUTION RESPIRATORY (INHALATION) at 01:02

## 2019-02-20 RX ADMIN — ALPRAZOLAM 0.5 MG: 0.5 TABLET ORAL at 09:02

## 2019-02-20 RX ADMIN — FLUTICASONE PROPIONATE 50 MCG: 50 SPRAY, METERED NASAL at 09:02

## 2019-02-20 RX ADMIN — METOPROLOL TARTRATE 50 MG: 50 TABLET ORAL at 10:02

## 2019-02-20 RX ADMIN — HYDROCODONE BITARTRATE AND ACETAMINOPHEN 1 TABLET: 5; 325 TABLET ORAL at 05:02

## 2019-02-20 RX ADMIN — VERAPAMIL HYDROCHLORIDE 40 MG: 40 TABLET ORAL at 04:02

## 2019-02-20 RX ADMIN — LEVOTHYROXINE SODIUM 112 MCG: 112 TABLET ORAL at 05:02

## 2019-02-20 RX ADMIN — ACETYLCYSTEINE 4 ML: 100 SOLUTION ORAL; RESPIRATORY (INHALATION) at 07:02

## 2019-02-20 NOTE — ASSESSMENT & PLAN NOTE
Most recent blood gas does not show hypercapnia. Pt is on CPAP at 4 at home. Would re-start CPAP tonight to see if pt tolerates her home regimen.

## 2019-02-20 NOTE — SUBJECTIVE & OBJECTIVE
Interval History:   Patient hypotensive overnight, asymptomatic, however MAPs >65. Did not tolerate BiPAP well; settings were changed. Otherwise no complaints.       Objective:     Vital Signs (Most Recent):  Temp: 98.5 °F (36.9 °C) (02/19/19 2119)  Pulse: 71 (02/20/19 1103)  Resp: 20 (02/20/19 0952)  BP: (!) 113/54 (02/20/19 0950)  SpO2: 98 % (02/20/19 0950) Vital Signs (24h Range):  Temp:  [98.5 °F (36.9 °C)] 98.5 °F (36.9 °C)  Pulse:  [66-95] 71  Resp:  [17-27] 20  SpO2:  [40 %-100 %] 98 %  BP: ()/(46-72) 113/54     Weight: 92.5 kg (204 lb)  Body mass index is 33.95 kg/m².     SpO2: 98 %  O2 Device (Oxygen Therapy): nasal cannula      Intake/Output Summary (Last 24 hours) at 2/20/2019 1125  Last data filed at 2/20/2019 0600  Gross per 24 hour   Intake 320 ml   Output 700 ml   Net -380 ml       Lines/Drains/Airways          None          Physical Exam   Constitutional: She is oriented to person, place, and time. She has a sickly appearance.   Patient not in respiratory distress. However, develops SOB w/ minimal movement in bed.    HENT:   Head: Normocephalic and atraumatic.   Eyes: Conjunctivae are normal. Pupils are equal, round, and reactive to light.   Neck: Neck supple. No JVD present.   Cardiovascular: Normal rate.   Murmur heard.  Pulmonary/Chest: Effort normal. No respiratory distress. She has decreased breath sounds in the right middle field and the right lower field. She has no wheezes.   Abdominal: Soft. Bowel sounds are normal. There is no tenderness. There is no rebound.   Musculoskeletal: She exhibits no edema or deformity.   Neurological: She is alert and oriented to person, place, and time.   Skin: Skin is warm. No erythema.       Significant Labs: All pertinent lab results from the last 24 hours have been reviewed.    Significant Imaging: reviewed

## 2019-02-20 NOTE — HPI
Makenzie Leija is a 78 y.o. female with PMHx COPD, [id number] squamous cell carcinoma of the RUL s/p definitive chemoradiation (in remission for 30 months), influenza, on home oxygen 3L, HFpEF, IHSS, paroxysmal afib, GIB, recent PE started on eliquis (12/2018), who was admitted to [facility] on 2/5/19 for acute on chronic hypoxic respiratory failure due to RLL pneumonia & B/L pleural effusions. At OSH, thoracentesis was performed 2/9/19 and fluid analysis was consistent with transudate (thoracentesis 12/18 negative cytology). Patient was treated w/ antibiotics for pna. OSH course was complicated by worsening heart failure in setting of IHSS/HOCM. FELICIA 2/11/19 showed EF 50% with severe IHSS (TTE from 12/2018 showed peak gradient across LVOT 179 mmHg, mean 94 mm Hg). Pt was diuresed ~15 L and started on BB but she remained symptomatic. Pt noted to have nightly hypoxia and reportedly increased BIPAP requirements (normally on CPAP at 4 at home for MARISOL). Pt was transferred to [facility] for advanced heart failure management of fluid status and possible outpatient alcohol septal ablation.

## 2019-02-20 NOTE — ASSESSMENT & PLAN NOTE
Makenzie Leija is a 78 y.o. 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 Pt completed Zosyn x12 days for pna. She was also diuresed ~15 L but reportedly had worsening nightly hypoxia and increased BIPAP requirement. She was transferred to [facility] [place] for advanced cardiac management of fluid status and possible outpatient alcohol septal ablation.    Recommendations:   · Pt's respiratory status appears to be near her baseline at time of evaluation. On bedside US exam, she had small amount of pleural fluid not amendable to drainage.   · Would recommend diuresis for fluid removal when appropriate from cardiac standpoint in this pt with IHSS and preload dependent.   · Will re-consider thoracentesis if pt has increase accumulation of pleural fluid and worsening respiratory distress.

## 2019-02-20 NOTE — CONSULTS
[facility]  Pulmonology  Consult Note    Patient Name: Makenzie Leija  MRN: [id number]  Admission Date: 2/19/2019  Hospital Length of Stay: 1 days  Code Status: Full Code  Attending Physician: Paramjit Bales MD  Primary Care Provider: Karthik Roth MD   Principal Problem: Acute on chronic respiratory failure    Inpatient consult to Pulmonology  Consult performed by: Sharon Lopez MD  Consult ordered by: Gareth Macias MD        Subjective:     HPI:  Makenzie Leija is a 78 y.o. female with PMHx COPD, [id number] squamous cell carcinoma of the RUL s/p definitive chemoradiation (in remission for 30 months), influenza, on home oxygen 3L, HFpEF, IHSS, paroxysmal afib, GIB, recent PE started on eliquis (12/2018), who was admitted to [facility] on 2/5/19 for acute on chronic hypoxic respiratory failure due to RLL pneumonia & B/L pleural effusions. At OSH, thoracentesis was performed 2/9/19 and fluid analysis was consistent with transudate (thoracentesis 12/18 negative cytology). Patient was treated w/ antibiotics for pna. OSH course was complicated by worsening heart failure in setting of IHSS/HOCM. FELICIA 2/11/19 showed EF 50% with severe IHSS (TTE from 12/2018 showed peak gradient across LVOT 179 mmHg, mean 94 mm Hg). Pt was diuresed ~15 L and started on BB but she remained symptomatic. Pt noted to have nightly hypoxia and reportedly increased BIPAP requirements (normally on CPAP at 4 at home for MARISOL). Pt was transferred to [facility] [place] for advanced heart failure management of fluid status and possible outpatient alcohol septal ablation.     Past Medical History:   Diagnosis Date    Anemia     Anxiety     Arthritis     Asthma     Atrial fibrillation     Cancer     Cataract     Clotting disorder     COPD (chronic obstructive pulmonary disease)     Coronary artery disease     Degenerative disc disease at L5-S1 level     Depression     Glaucoma     Heart murmur      History of stomach ulcers     Hypertension     Myocardial infarction     Neuromuscular disorder     Thyroid disease     Tuberculosis        Past Surgical History:   Procedure Laterality Date    CHOLECYSTECTOMY      COLON SURGERY      EYE SURGERY      TONSILLECTOMY         Review of patient's allergies indicates:   Allergen Reactions    Augmentin [amoxicillin-pot clavulanate]     Cosopt [dorzolamide-timolol]     Cymbalta [duloxetine]     Isordil [isosorbide dinitrate]     Procardia [nifedipine]        Family History     None        Tobacco Use    Smoking status: Former Smoker     Last attempt to quit: 1/14/2016     Years since quitting: 3.1   Substance and Sexual Activity    Alcohol use: No     Alcohol/week: 0.0 oz    Drug use: No    Sexual activity: Not on file         Review of Systems   Constitutional: Positive for fatigue. Negative for chills and fever.   HENT: Negative for sore throat and trouble swallowing.    Eyes: Negative for pain and visual disturbance.   Respiratory: Positive for cough (dry), chest tightness and shortness of breath (on exertion).    Cardiovascular: Negative for chest pain and palpitations.   Gastrointestinal: Negative for abdominal pain, blood in stool and nausea.   Genitourinary: Negative for difficulty urinating and dysuria.   Musculoskeletal: Negative for arthralgias and gait problem.   Neurological: Negative for dizziness and headaches.   Psychiatric/Behavioral: Negative for confusion. The patient is not nervous/anxious.      Objective:     Vital Signs (Most Recent):  Temp: 98.5 °F (36.9 °C) (02/19/19 2119)  Pulse: 78 (02/20/19 0950)  Resp: 20 (02/20/19 0950)  BP: (!) 113/54 (02/20/19 0950)  SpO2: 98 % (02/20/19 0950) Vital Signs (24h Range):  Temp:  [98.5 °F (36.9 °C)] 98.5 °F (36.9 °C)  Pulse:  [66-95] 78  Resp:  [17-27] 20  SpO2:  [40 %-100 %] 98 %  BP: ()/(46-72) 113/54     Weight: 92.5 kg (204 lb)  Body mass index is 33.95 kg/m².      Intake/Output Summary  (Last 24 hours) at 2/20/2019 0953  Last data filed at 2/20/2019 0600  Gross per 24 hour   Intake 320 ml   Output 700 ml   Net -380 ml       Physical Exam   Constitutional: She is oriented to person, place, and time. She appears well-developed. No distress.   HENT:   Head: Normocephalic and atraumatic.   Neck: Neck supple. No tracheal deviation present.   Cardiovascular: Normal rate, regular rhythm and intact distal pulses.   Murmur heard.   Systolic murmur is present.  Pulmonary/Chest: She has decreased breath sounds in the right lower field. She has wheezes (mild diffuse wheezes). She has rales (bibasilar).   On 4L nasal cannula, with SpO2 96%   Abdominal: Soft. She exhibits no distension.   Musculoskeletal: She exhibits edema (trace, bilateral lower extremities). She exhibits no deformity.   Neurological: She is alert and oriented to person, place, and time.   Skin: Skin is warm and dry.   Nursing note and vitals reviewed.      Vents:       Lines/Drains/Airways          None          Significant Labs:    CBC/Anemia Profile:  Recent Labs   Lab 02/19/19  1036   WBC 10.11   HGB 8.4*   HCT 28.5*      MCV 96   RDW 16.2*        Chemistries:  Recent Labs   Lab 02/19/19  1036      K 4.6      CO2 25   BUN 28*   CREATININE 0.9   CALCIUM 9.8   ALBUMIN 3.1*   PROT 6.8   BILITOT 0.4   ALKPHOS 76   ALT 16   AST 12   MG 2.2       ABGs:   Recent Labs   Lab 02/19/19  1625   PH 7.458*   PCO2 32.3*   HCO3 22.8*   POCSATURATED 99   BE -1       Significant Imaging:   I have reviewed all pertinent imaging results/findings within the past 24 hours.   X-Ray Chest 1 View   Final Result      1. Cardiomegaly.   2. Pulmonary vascular congestion with interstitial edema in the bases.  Bilateral pleural effusions.         Electronically signed by: Ti Holland MD   Date:    02/19/2019   Time:    10:39            Assessment/Plan:     * Acute on chronic respiratory failure    Makenzie Leija is a 78 y.o. 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 Pt completed Zosyn x12 days for pna. She was also diuresed ~15 L but reportedly had worsening nightly hypoxia and increased BIPAP requirement. She was transferred to [facility] [place] for advanced cardiac management of fluid status and possible outpatient alcohol septal ablation.    Recommendations:   · Pt's respiratory status appears to be near her baseline at time of evaluation. On bedside US exam, she had small amount of pleural fluid not amendable to drainage.   · Would recommend diuresis for fluid removal when appropriate from cardiac standpoint in this pt with IHSS and preload dependent.   · Will re-consider thoracentesis if pt has increase accumulation of pleural fluid and worsening respiratory distress.        MARISOL on CPAP    Most recent blood gas does not show hypercapnia. Pt is on CPAP at 4 at home. Would re-start CPAP tonight to see if pt tolerates her home regimen.      Pleural effusion, bilateral    · Thoracentesis performed at OSH 2/9/19. Plueral fluid analysis was consistent with transudate (thoracentesis 12/18 negative cytology)  · Pt had persistence of R effusion (~300 mL) observed on US. No further thoracentesis performed at OSH due to pt on anticoagulation for RML PE 12/2018  · See "Acute on chronic respiratory failure"      COPD (chronic obstructive pulmonary disease)    · Pt is not hypercapnic with satisfactory SpO2 on her home O2 regimen (3L nasal cannula). Pt does not appear to be in COPD exacerbation   · The acute aspect of her chronic dyspnea is likely multifactorial (fluid overload, recent pna, deconditioning)    Recommendations:  · Would add Spiriva to current management to mirror pt's home " regimen.  · Continue home O2 regimen with goal SpO2 goal 88-92%            Thank you for your consult. I will follow-up with patient. Please contact us if you have any additional questions.     Sharon Lopez MD  Pulmonology  [facility]

## 2019-02-20 NOTE — PLAN OF CARE
Rounded to room, wrote name on board, pt is sleeping in chair with      02/20/19 1538   Discharge Assessment   Assessment Type Discharge Planning Assessment        02/20/19 1538   Discharge Assessment   Assessment Type Discharge Planning Assessment   oxygen ; no family at bs

## 2019-02-20 NOTE — ASSESSMENT & PLAN NOTE
"· Thoracentesis performed at OSH 2/9/19. Plueral fluid analysis was consistent with transudate (thoracentesis 12/18 negative cytology)  · Pt had persistence of R effusion (~300 mL) observed on US. No further thoracentesis performed at OSH due to pt on anticoagulation for RML PE 12/2018  · See "Acute on chronic respiratory failure"   "

## 2019-02-20 NOTE — ASSESSMENT & PLAN NOTE
· Pt is not hypercapnic with satisfactory SpO2 on her home O2 regimen (3L nasal cannula). Pt does not appear to be in COPD exacerbation   · The acute aspect of her chronic dyspnea is likely multifactorial (fluid overload, recent pna, deconditioning)    Recommendations:  · Would add Spiriva to current management to mirror pt's home regimen.  · Continue home O2 regimen with goal SpO2 goal 88-92%

## 2019-02-20 NOTE — SUBJECTIVE & OBJECTIVE
Past Medical History:   Diagnosis Date    Anemia     Anxiety     Arthritis     Asthma     Atrial fibrillation     Cancer     Cataract     Clotting disorder     COPD (chronic obstructive pulmonary disease)     Coronary artery disease     Degenerative disc disease at L5-S1 level     Depression     Glaucoma     Heart murmur     History of stomach ulcers     Hypertension     Myocardial infarction     Neuromuscular disorder     Thyroid disease     Tuberculosis        Past Surgical History:   Procedure Laterality Date    CHOLECYSTECTOMY      COLON SURGERY      EYE SURGERY      TONSILLECTOMY         Review of patient's allergies indicates:   Allergen Reactions    Augmentin [amoxicillin-pot clavulanate]     Cosopt [dorzolamide-timolol]     Cymbalta [duloxetine]     Isordil [isosorbide dinitrate]     Procardia [nifedipine]        Family History     None        Tobacco Use    Smoking status: Former Smoker     Last attempt to quit: 1/14/2016     Years since quitting: 3.1   Substance and Sexual Activity    Alcohol use: No     Alcohol/week: 0.0 oz    Drug use: No    Sexual activity: Not on file         Review of Systems   Constitutional: Positive for fatigue. Negative for chills and fever.   HENT: Negative for sore throat and trouble swallowing.    Eyes: Negative for pain and visual disturbance.   Respiratory: Positive for cough (dry), chest tightness and shortness of breath (on exertion).    Cardiovascular: Negative for chest pain and palpitations.   Gastrointestinal: Negative for abdominal pain, blood in stool and nausea.   Genitourinary: Negative for difficulty urinating and dysuria.   Musculoskeletal: Negative for arthralgias and gait problem.   Neurological: Negative for dizziness and headaches.   Psychiatric/Behavioral: Negative for confusion. The patient is not nervous/anxious.      Objective:     Vital Signs (Most Recent):  Temp: 98.5 °F (36.9 °C) (02/19/19 2119)  Pulse: 78 (02/20/19  0950)  Resp: 20 (02/20/19 0950)  BP: (!) 113/54 (02/20/19 0950)  SpO2: 98 % (02/20/19 0950) Vital Signs (24h Range):  Temp:  [98.5 °F (36.9 °C)] 98.5 °F (36.9 °C)  Pulse:  [66-95] 78  Resp:  [17-27] 20  SpO2:  [40 %-100 %] 98 %  BP: ()/(46-72) 113/54     Weight: 92.5 kg (204 lb)  Body mass index is 33.95 kg/m².      Intake/Output Summary (Last 24 hours) at 2/20/2019 0953  Last data filed at 2/20/2019 0600  Gross per 24 hour   Intake 320 ml   Output 700 ml   Net -380 ml       Physical Exam   Constitutional: She is oriented to person, place, and time. She appears well-developed. No distress.   HENT:   Head: Normocephalic and atraumatic.   Neck: Neck supple. No tracheal deviation present.   Cardiovascular: Normal rate, regular rhythm and intact distal pulses.   Murmur heard.   Systolic murmur is present.  Pulmonary/Chest: She has decreased breath sounds in the right lower field. She has wheezes (mild diffuse wheezes). She has rales (bibasilar).   On 4L nasal cannula, with SpO2 96%   Abdominal: Soft. She exhibits no distension.   Musculoskeletal: She exhibits edema (trace, bilateral lower extremities). She exhibits no deformity.   Neurological: She is alert and oriented to person, place, and time.   Skin: Skin is warm and dry.   Nursing note and vitals reviewed.      Vents:       Lines/Drains/Airways          None          Significant Labs:    CBC/Anemia Profile:  Recent Labs   Lab 02/19/19  1036   WBC 10.11   HGB 8.4*   HCT 28.5*      MCV 96   RDW 16.2*        Chemistries:  Recent Labs   Lab 02/19/19  1036      K 4.6      CO2 25   BUN 28*   CREATININE 0.9   CALCIUM 9.8   ALBUMIN 3.1*   PROT 6.8   BILITOT 0.4   ALKPHOS 76   ALT 16   AST 12   MG 2.2       ABGs:   Recent Labs   Lab 02/19/19  1625   PH 7.458*   PCO2 32.3*   HCO3 22.8*   POCSATURATED 99   BE -1       Significant Imaging:   I have reviewed all pertinent imaging results/findings within the past 24 hours.   X-Ray Chest 1 View   Final  Result      1. Cardiomegaly.   2. Pulmonary vascular congestion with interstitial edema in the bases.  Bilateral pleural effusions.         Electronically signed by: Ti Holland MD   Date:    02/19/2019   Time:    10:39

## 2019-02-21 PROBLEM — D62 ACUTE BLOOD LOSS ANEMIA: Status: ACTIVE | Noted: 2019-02-21

## 2019-02-21 LAB
ABO + RH BLD: NORMAL
ALBUMIN SERPL BCP-MCNC: 2.7 G/DL
ALP SERPL-CCNC: 67 U/L
ALT SERPL W/O P-5'-P-CCNC: 10 U/L
ANION GAP SERPL CALC-SCNC: 5 MMOL/L
ASCENDING AORTA: 3.08 CM
AST SERPL-CCNC: 9 U/L
BASOPHILS # BLD AUTO: 0.06 K/UL
BASOPHILS NFR BLD: 0.6 %
BILIRUB SERPL-MCNC: 0.4 MG/DL
BLD GP AB SCN CELLS X3 SERPL QL: NORMAL
BLD PROD TYP BPU: NORMAL
BLOOD UNIT EXPIRATION DATE: NORMAL
BLOOD UNIT TYPE CODE: 5100
BLOOD UNIT TYPE: NORMAL
BSA FOR ECHO PROCEDURE: 2.04 M2
BUN SERPL-MCNC: 26 MG/DL
CALCIUM SERPL-MCNC: 9.4 MG/DL
CHLORIDE SERPL-SCNC: 105 MMOL/L
CO2 SERPL-SCNC: 27 MMOL/L
CODING SYSTEM: NORMAL
CREAT SERPL-MCNC: 0.9 MG/DL
CV ECHO LV RWT: 0.48 CM
DIFFERENTIAL METHOD: ABNORMAL
DISPENSE STATUS: NORMAL
DOP CALC LVOT AREA: 3.33 CM2
DOP CALC LVOT DIAMETER: 2.06 CM
E WAVE DECELERATION TIME: 219.23 MSEC
E/A RATIO: 1.19
E/E' RATIO: 22.86
ECHO LV POSTERIOR WALL: 0.98 CM (ref 0.6–1.1)
EOSINOPHIL # BLD AUTO: 0.1 K/UL
EOSINOPHIL NFR BLD: 1 %
ERYTHROCYTE [DISTWIDTH] IN BLOOD BY AUTOMATED COUNT: 15.9 %
EST. GFR  (AFRICAN AMERICAN): >60 ML/MIN/1.73 M^2
EST. GFR  (NON AFRICAN AMERICAN): >60 ML/MIN/1.73 M^2
FRACTIONAL SHORTENING: 32 % (ref 28–44)
GLUCOSE SERPL-MCNC: 128 MG/DL
HCT VFR BLD AUTO: 23.4 %
HGB BLD-MCNC: 7 G/DL
IMM GRANULOCYTES # BLD AUTO: 0.05 K/UL
IMM GRANULOCYTES NFR BLD AUTO: 0.5 %
INR PPP: 0.9
INTERVENTRICULAR SEPTUM: 1.44 CM (ref 0.6–1.1)
IVRT: 0.05 MSEC
LA MAJOR: 6.1 CM
LA MINOR: 6.13 CM
LA WIDTH: 5.35 CM
LEFT ATRIUM SIZE: 4.94 CM
LEFT ATRIUM VOLUME INDEX: 69.5 ML/M2
LEFT ATRIUM VOLUME: 137.37 CM3
LEFT INTERNAL DIMENSION IN SYSTOLE: 2.8 CM (ref 2.1–4)
LEFT VENTRICLE DIASTOLIC VOLUME INDEX: 38.06 ML/M2
LEFT VENTRICLE DIASTOLIC VOLUME: 75.25 ML
LEFT VENTRICLE MASS INDEX: 88.6 G/M2
LEFT VENTRICLE SYSTOLIC VOLUME INDEX: 14.9 ML/M2
LEFT VENTRICLE SYSTOLIC VOLUME: 29.45 ML
LEFT VENTRICULAR INTERNAL DIMENSION IN DIASTOLE: 4.12 CM (ref 3.5–6)
LEFT VENTRICULAR MASS: 175.15 G
LV LATERAL E/E' RATIO: 26.67
LV SEPTAL E/E' RATIO: 20
LYMPHOCYTES # BLD AUTO: 0.5 K/UL
LYMPHOCYTES NFR BLD: 4.9 %
MAGNESIUM SERPL-MCNC: 2.2 MG/DL
MCH RBC QN AUTO: 27.8 PG
MCHC RBC AUTO-ENTMCNC: 29.9 G/DL
MCV RBC AUTO: 93 FL
MONOCYTES # BLD AUTO: 0.8 K/UL
MONOCYTES NFR BLD: 8.4 %
MV PEAK A VEL: 1.35 M/S
MV PEAK E VEL: 1.6 M/S
NEUTROPHILS # BLD AUTO: 8.4 K/UL
NEUTROPHILS NFR BLD: 84.6 %
NRBC BLD-RTO: 0 /100 WBC
PISA TR MAX VEL: 3.19 M/S
PLATELET # BLD AUTO: 224 K/UL
PMV BLD AUTO: 11.3 FL
POCT GLUCOSE: 114 MG/DL (ref 70–110)
POCT GLUCOSE: 125 MG/DL (ref 70–110)
POCT GLUCOSE: 208 MG/DL (ref 70–110)
POTASSIUM SERPL-SCNC: 4.4 MMOL/L
PROT SERPL-MCNC: 6.1 G/DL
PROTHROMBIN TIME: 10 SEC
PULM VEIN S/D RATIO: 1.21
PV PEAK D VEL: 0.47 M/S
PV PEAK S VEL: 0.57 M/S
PV PEAK VELOCITY: 1.48 CM/S
RA MAJOR: 5.26 CM
RA PRESSURE: 3 MMHG
RA WIDTH: 4.05 CM
RBC # BLD AUTO: 2.52 M/UL
RIGHT VENTRICULAR END-DIASTOLIC DIMENSION: 4.06 CM
RV TISSUE DOPPLER FREE WALL SYSTOLIC VELOCITY 1 (APICAL 4 CHAMBER VIEW): 16.6 M/S
SINUS: 2.84 CM
SODIUM SERPL-SCNC: 137 MMOL/L
STJ: 2.04 CM
TDI LATERAL: 0.06
TDI SEPTAL: 0.08
TDI: 0.07
TR MAX PG: 40.7 MMHG
TRANS ERYTHROCYTES VOL PATIENT: NORMAL ML
TRICUSPID ANNULAR PLANE SYSTOLIC EXCURSION: 2.37 CM
TV REST PULMONARY ARTERY PRESSURE: 44 MMHG
WBC # BLD AUTO: 9.95 K/UL

## 2019-02-21 PROCEDURE — 27000221 HC OXYGEN, UP TO 24 HOURS

## 2019-02-21 PROCEDURE — 93010 ELECTROCARDIOGRAM REPORT: CPT | Mod: ,,, | Performed by: INTERNAL MEDICINE

## 2019-02-21 PROCEDURE — 63600175 PHARM REV CODE 636 W HCPCS: Performed by: STUDENT IN AN ORGANIZED HEALTH CARE EDUCATION/TRAINING PROGRAM

## 2019-02-21 PROCEDURE — P9021 RED BLOOD CELLS UNIT: HCPCS

## 2019-02-21 PROCEDURE — 85610 PROTHROMBIN TIME: CPT

## 2019-02-21 PROCEDURE — 86850 RBC ANTIBODY SCREEN: CPT

## 2019-02-21 PROCEDURE — 97165 OT EVAL LOW COMPLEX 30 MIN: CPT

## 2019-02-21 PROCEDURE — 83735 ASSAY OF MAGNESIUM: CPT

## 2019-02-21 PROCEDURE — 86920 COMPATIBILITY TEST SPIN: CPT

## 2019-02-21 PROCEDURE — 36430 TRANSFUSION BLD/BLD COMPNT: CPT

## 2019-02-21 PROCEDURE — 99232 PR SUBSEQUENT HOSPITAL CARE,LEVL II: ICD-10-PCS | Mod: GC,,, | Performed by: INTERNAL MEDICINE

## 2019-02-21 PROCEDURE — 25000242 PHARM REV CODE 250 ALT 637 W/ HCPCS: Performed by: STUDENT IN AN ORGANIZED HEALTH CARE EDUCATION/TRAINING PROGRAM

## 2019-02-21 PROCEDURE — 85025 COMPLETE CBC W/AUTO DIFF WBC: CPT

## 2019-02-21 PROCEDURE — 94640 AIRWAY INHALATION TREATMENT: CPT

## 2019-02-21 PROCEDURE — 94660 CPAP INITIATION&MGMT: CPT

## 2019-02-21 PROCEDURE — 11000001 HC ACUTE MED/SURG PRIVATE ROOM

## 2019-02-21 PROCEDURE — 97161 PT EVAL LOW COMPLEX 20 MIN: CPT

## 2019-02-21 PROCEDURE — 25000003 PHARM REV CODE 250: Performed by: STUDENT IN AN ORGANIZED HEALTH CARE EDUCATION/TRAINING PROGRAM

## 2019-02-21 PROCEDURE — 93010 EKG 12-LEAD: ICD-10-PCS | Mod: ,,, | Performed by: INTERNAL MEDICINE

## 2019-02-21 PROCEDURE — 93005 ELECTROCARDIOGRAM TRACING: CPT | Performed by: INTERNAL MEDICINE

## 2019-02-21 PROCEDURE — 99900035 HC TECH TIME PER 15 MIN (STAT)

## 2019-02-21 PROCEDURE — 94761 N-INVAS EAR/PLS OXIMETRY MLT: CPT

## 2019-02-21 PROCEDURE — 99232 SBSQ HOSP IP/OBS MODERATE 35: CPT | Mod: GC,,, | Performed by: INTERNAL MEDICINE

## 2019-02-21 PROCEDURE — 25000003 PHARM REV CODE 250: Performed by: INTERNAL MEDICINE

## 2019-02-21 PROCEDURE — 80053 COMPREHEN METABOLIC PANEL: CPT

## 2019-02-21 PROCEDURE — 36415 COLL VENOUS BLD VENIPUNCTURE: CPT

## 2019-02-21 RX ORDER — HYDROCODONE BITARTRATE AND ACETAMINOPHEN 500; 5 MG/1; MG/1
TABLET ORAL
Status: DISCONTINUED | OUTPATIENT
Start: 2019-02-21 | End: 2019-03-03

## 2019-02-21 RX ADMIN — IPRATROPIUM BROMIDE AND ALBUTEROL SULFATE 3 ML: .5; 3 SOLUTION RESPIRATORY (INHALATION) at 12:02

## 2019-02-21 RX ADMIN — HYDROCODONE BITARTRATE AND ACETAMINOPHEN 1 TABLET: 5; 325 TABLET ORAL at 02:02

## 2019-02-21 RX ADMIN — FLUTICASONE PROPIONATE 50 MCG: 50 SPRAY, METERED NASAL at 11:02

## 2019-02-21 RX ADMIN — METOPROLOL TARTRATE 50 MG: 50 TABLET ORAL at 11:02

## 2019-02-21 RX ADMIN — LEVOTHYROXINE SODIUM 112 MCG: 112 TABLET ORAL at 05:02

## 2019-02-21 RX ADMIN — HYDROCODONE BITARTRATE AND ACETAMINOPHEN 1 TABLET: 5; 325 TABLET ORAL at 11:02

## 2019-02-21 RX ADMIN — IPRATROPIUM BROMIDE AND ALBUTEROL SULFATE 3 ML: .5; 3 SOLUTION RESPIRATORY (INHALATION) at 07:02

## 2019-02-21 RX ADMIN — MONTELUKAST SODIUM 10 MG: 10 TABLET, FILM COATED ORAL at 08:02

## 2019-02-21 RX ADMIN — ALPRAZOLAM 0.5 MG: 0.5 TABLET ORAL at 11:02

## 2019-02-21 RX ADMIN — ENOXAPARIN SODIUM 90 MG: 100 INJECTION SUBCUTANEOUS at 05:02

## 2019-02-21 RX ADMIN — IPRATROPIUM BROMIDE AND ALBUTEROL SULFATE 3 ML: .5; 3 SOLUTION RESPIRATORY (INHALATION) at 11:02

## 2019-02-21 RX ADMIN — IPRATROPIUM BROMIDE AND ALBUTEROL SULFATE 3 ML: .5; 3 SOLUTION RESPIRATORY (INHALATION) at 01:02

## 2019-02-21 RX ADMIN — SODIUM CHLORIDE 125 MG: 9 INJECTION, SOLUTION INTRAVENOUS at 12:02

## 2019-02-21 RX ADMIN — METOPROLOL TARTRATE 50 MG: 50 TABLET ORAL at 08:02

## 2019-02-21 RX ADMIN — VERAPAMIL HYDROCHLORIDE 40 MG: 40 TABLET ORAL at 08:02

## 2019-02-21 RX ADMIN — ACETYLCYSTEINE 4 ML: 100 SOLUTION ORAL; RESPIRATORY (INHALATION) at 07:02

## 2019-02-21 RX ADMIN — IPRATROPIUM BROMIDE AND ALBUTEROL SULFATE 3 ML: .5; 3 SOLUTION RESPIRATORY (INHALATION) at 08:02

## 2019-02-21 RX ADMIN — VERAPAMIL HYDROCHLORIDE 40 MG: 40 TABLET ORAL at 11:02

## 2019-02-21 RX ADMIN — GABAPENTIN 100 MG: 100 CAPSULE ORAL at 11:02

## 2019-02-21 RX ADMIN — GABAPENTIN 100 MG: 100 CAPSULE ORAL at 08:02

## 2019-02-21 RX ADMIN — INSULIN ASPART 2 UNITS: 100 INJECTION, SOLUTION INTRAVENOUS; SUBCUTANEOUS at 12:02

## 2019-02-21 RX ADMIN — ACETYLCYSTEINE 4 ML: 100 SOLUTION ORAL; RESPIRATORY (INHALATION) at 01:02

## 2019-02-21 NOTE — ASSESSMENT & PLAN NOTE
Hx of RML embolus diagnosed 12/2018    - h/h down trending 7.0 (8.5 on admission)  - discontinue lovenox   - transfuse 1U PRBC  - hold off on AC

## 2019-02-21 NOTE — ASSESSMENT & PLAN NOTE
· Hypertrophic cardiomyopathy with asymmetric septal hypertrophy  · Systolic anterior motion of the mitral valve with severe outflow tract left ventricular obstruction. The peak gradient is near 100mm Hg.      - start BB and CCB  - uptitrate medications as tolerated   - will need outpatient interventional cardiology follow-up for ablation

## 2019-02-21 NOTE — PROGRESS NOTES
Nurse, Nessa, came to retake pt blood pressure with visi mobile and was 100/60. Pt also verbalize that if she stands up she will feel dizzy. Nurse, Rosa Isela, came to retake pt bp about 20 minutes after Nessa and pt's bp was 96/40 manually. Cardiology team paged. Awaiting for MD to call back to notify.

## 2019-02-21 NOTE — PROGRESS NOTES
Pt blood pressure 95/45 and pt denied dizziness. Cardiology team called and MD told to call number [phone]. Nurse called [phone] twice but no one answer.     Cardiology team called and MD was notified about most recent blood pressure was 83/39 and pt denied dizziness and pt appeared asymptomatic and sitting in the chair. MD said to hold next dose of verapamil if Sbp<90 and no bolus is needed now since pt is asymptomatic. Pt and daughter also requested ENT consult for [place] and miralax for constipation and boost drink with meals. MD said he will put those orders in.

## 2019-02-21 NOTE — ASSESSMENT & PLAN NOTE
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     Currently patient's h/h is dropping concern for bleed, are discontinue her AC. Although she had PE in dec/2018; they were provoked and  A recent CTA feb/2019 was negative for PE. The risks of AC currently outweigh the benefits.

## 2019-02-21 NOTE — PLAN OF CARE
Hospital Medicine Acceptance Note    Date of Admit: 2/19/2019  Date of Transfer / Stepdown: 2/21/2019  Borayebnavdeeps, C/J, L, Onc (IV chemo w/in 1 month), Gyn/Onc, or other special case?: no  Team transferring patient: Cardiology  Team member giving verbal handoff: Kd Morfin MD- cards fellow  Accepting  team: [facility]    Brief History of Present Illness:      Ms. Leija is a 78 y.o  F w/ a medical history significant for COPD home oxygen 3L, HFpEF,paroxysmal a.fib, CAD, HTN, HLD, DMII recent PE on AC w/ eliquis (12/2018), right upper lobe squamous cell cancer s/p chemotherapy and radiation (dx may 2016), GI bleed and who was admitted to [facility] on 2/5/19 for acute on chronic hypoxic respiratory failure due to RLL pneumonia & B/L pleural effusions who is now [place] [facility] [place].      Per OSH records:  Pt presented on 2/5/29 via EMS for respiratory distress despite supplemental oxygen. Pt reports increased SOB over the last 4-5 days. On the day of admission significant SOB despite inhale and changing from oxygen compressor to tank. SpO2 75% on 3.5 L at home. Triage noted patient's SpO2 69% on 3L NC. Patient was placed on BiPaP w/ improvement in her respiratory function. CTA chest 2/7/19: without evidence of PE; moderate bilateral pleural effusions; RML consolidation. Thoracentesis 2/9/19: consistent with transudate. (thoracentesis 12/18 negative cytology). Patient was treated w/ antibiotics. Echo December 2018, IHSS, elevated peak gradient across LVOT at 179mmHg and a mean of 94mmHg. FELICIA performed. Patient remained in the hospital till 2/19/19; during which she was diuresed ~15L, started on BB yet remained symptomatic. And decision was made to [place] patient [place] [facility]        Hospital Course:     Patient initially admitted to Cardiology for HOCM.  Echo was repeated at that time and medications were up titrated as patient tolerated.  Patient will need outpatient  septal ablation.  Patient developed some worsening respiratory distress and pulmonology was consulted.  Patient also was restarted on Lovenox for her history of PE.  Patient developed an acute drop in her hemoglobin down to 7 and also has some subcutaneous ecchymoses noted in the abdomen.  Patient also requiring oxygen at 3 L via nasal cannula.  Cardiology requesting transfer to Hospital Medicine for further workup of her anemia and hypoxia.        Consultants and Procedures:     Consultants:  Ines, Yen    Procedures:    None    Transfer Information:     Diet:  Cardiac    Physical Activity:  As tolerated    To Do / Pending Studies / Follow ups:  Wean O2 as able  Monitor H/H, if continues to drop will need to consult GI  Follow up with pulm regarding recs for PE  Follow up with IC as outpatient for septal ablation     Patient has been accepted by Hospital Medicine Team [facility], who will assume care of the patient.     Corina Dias MD  Hospital Medicine Staff  [phone] pager

## 2019-02-21 NOTE — PT/OT/SLP EVAL
Occupational Therapy   Evaluation    Name: Makenzie Leija  MRN: [id number]  Admitting Diagnosis:  Acute on chronic respiratory failure      Recommendations:     Discharge Recommendations: (TBD)  Discharge Equipment Recommendations:  none  Barriers to discharge:  None    Assessment:     Makenzie Leija is a 78 y.o. female with a medical diagnosis of Acute on chronic respiratory failure.  She presents with low H/H, BP, and O2 sats which limit her occupational performance. Performance deficits affecting function: impaired endurance, impaired self care skills, impaired functional mobilty, impaired balance, impaired coordination, impaired cardiopulmonary response to activity.      Rehab Prognosis: Fair; patient would benefit from acute skilled OT services to address these deficits and reach maximum level of function.       Plan:     Patient to be seen 3 x/week to address the above listed problems via self-care/home management, therapeutic activities, therapeutic exercises  · Plan of Care Expires: 02/21/19  · Plan of Care Reviewed with: patient, daughter    Subjective     Chief Complaint: SOB  Patient/Family Comments/goals: Medical management and discharge.     Occupational Profile:  Living Environment: Pt lives w/ sisters in a [facility] w/ [facility], and has a modified home.   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  Patient found up in chair with peripheral IV, PureWick, oxygen upon OT entry to room.    General Precautions: Standard, fall   Orthopedic  Precautions:N/A   Braces: N/A     Occupational Performance:    Bed Mobility:    · Not assessed    Functional Mobility/Transfers:  · Not able to assess due to BP 80/45, and O2 sats of 88%  · Functional Mobility: talking and moving arms and legs.     Activities of Daily Living:  · Not able to assess      Cognitive/Visual Perceptual:  Completely cognitively intact adult w/ glasses worn or present during eval.     Physical Exam:  Dominant hand:    -       RH  Upper Extremity Range of Motion:     -       Right Upper Extremity: Deficits: limited to ~85* shldr flex/abd from old RC injury  -       Left Upper Extremity: see above  Upper Extremity Strength:    -       Right Upper Extremity: WFL  -       Left Upper Extremity: WFL   Strength:    -       Right Upper Extremity: WFL  -       Left Upper Extremity: WFL  Fine Motor Coordination:    -       Intact  Gross motor coordination:   WFL    AMPAC 6 Click ADL:  AMPAC Total Score: 12    Treatment & Education:  -Pt edu on OT role/POC  -Importance of OOB activity with staff assistance  -Safety during functional t/f and mobility  - White board updated  - Multiple self care tasks/functional mobility completed-- assistance level noted above  - All questions/concerns answered within OT scope of practice    Education:    Patient left up in chair with all lines intact, call button in reach, RN notified and family and MDs present    GOALS:   Multidisciplinary Problems     Occupational Therapy Goals        Problem: Occupational Therapy Goal    Goal Priority Disciplines Outcome Interventions   Occupational Therapy Goal     OT, PT/OT     Description:  Goals to be met by: 3/21/2019    Patient will increase functional independence with ADLs by performing:    Toileting from toilet with Minimal Assistance for hygiene and clothing management.   Bathing from  standing at sink with Minimal Assistance.  Stand pivot transfers with Minimal Assistance.  Squat pivot transfers with Minimal  Assistance.  Toilet transfer to bedside commode with Contact Guard Assistance.                      History:     Past Medical History:   Diagnosis Date    Anemia     Anxiety     Arthritis     Asthma     Atrial fibrillation     Cancer     Cataract     Clotting disorder     COPD (chronic obstructive pulmonary disease)     Coronary artery disease     Degenerative disc disease at L5-S1 level     Depression     Glaucoma     Heart murmur     History of stomach ulcers     Hypertension     Myocardial infarction     Neuromuscular disorder     Thyroid disease     Tuberculosis        Past Surgical History:   Procedure Laterality Date    CHOLECYSTECTOMY      COLON SURGERY      EYE SURGERY      TONSILLECTOMY         Time Tracking:     OT Date of Treatment: 02/21/19  OT Start Time: 1419  OT Stop Time: 1430  OT Total Time (min): 11 min    Billable Minutes:Evaluation 11 mins    Jason Malagon, OT  2/21/2019

## 2019-02-21 NOTE — PT/OT/SLP EVAL
"Physical Therapy Evaluation    Patient Name:  Makenzie Leija   MRN:  [id number]    Recommendations:     Discharge Recommendations:  (TBD)   Discharge Equipment Recommendations: none   Barriers to discharge: Decreased caregiver support    Assessment:     Makenzie Leija is a 78 y.o. female admitted with a medical diagnosis of Acute on chronic respiratory failure.  She presents with the following impairments/functional limitations:  weakness, gait instability, impaired balance, impaired endurance, impaired self care skills, impaired functional mobilty, impaired cardiopulmonary response to activity.  Pt demo decreased functional mobility secondary to limited endurance and impaired cardiopulmonary response to activities.  Pt demo labored breathing while UIC.  Per chart pt c low BP (94/40) and O2 sats on 3L 88%. No increased in O2 sats following cues for deep breathing.  Transfer/gait assessment deferred d/t pt's BP and O2 sats c labored breathing.  Pt able to reposition self in chair independent and demo 5/5 strength BLE.  Will continue to assess pt 3x/wk and adjust POC accordingly.    Rehab Prognosis: Good; patient would benefit from acute skilled PT services to address these deficits and reach maximum level of function.    Recent Surgery: * No surgery found *      Plan:     During this hospitalization, patient to be seen 3 x/week to address the identified rehab impairments via gait training, therapeutic activities, therapeutic exercises, neuromuscular re-education and progress toward the following goals:    · Plan of Care Expires:  03/21/19    Subjective     Chief Complaint: fatigue  Patient/Family Comments/goals: "I've been in the hospital on and off for 6 months."  Pain/Comfort:  · Pain Rating 1: 0/10    Patients cultural, spiritual, Tenriism conflicts given the current situation: no    Living Environment:  Pt lives alone in [facility] but has daughter living next door.  Pt's daughters able to provide 24hr care.  " PTA 1x fall in October.   Prior to admission, patients level of function was Independent c ADLs (needed assistance c cooking and recent difficulty c feeding d/t SOB) and using rollator for mobility.  Equipment used at home: walker, rolling, rollator, shower chair, raised toilet, bedside commode, oxygen, nebulizer, wheelchair.  DME owned (not currently used): none.  Upon discharge, patient will have assistance from family.    Objective:     Communicated with RN and OT prior to session.  Patient found [facility] telemetry, oxygen, peripheral IV, PureWick, pulse ox (continuous)  upon PT entry to room.    General Precautions: Standard, fall   Orthopedic Precautions:N/A   Braces: N/A     Exams:  · Cognitive Exam:  Patient is oriented to Person, Place, Time and Situation  · Gross Motor Coordination:  WFL  · Sensation:    · -       Intact  · RLE ROM: WFL  · RLE Strength: WFL  · LLE ROM: WFL  · LLE Strength: WFL    Functional Mobility:  · No mobility performed on this date d/t pt's BP, O2 sats and medical team arrival.  · Pt able to reposition self in chair independently      Therapeutic Activities and Exercises:  Pt educated on: PT role/POC; safety c mobility; benefits of OOB activities; performing therex; d/c recs - v/u      AM-PAC 6 CLICK MOBILITY  Total Score:14     Patient left up in chair with all lines intact, call button in reach, RN notified and daughters and medical team present.    GOALS:   Multidisciplinary Problems     Physical Therapy Goals        Problem: Physical Therapy Goal    Goal Priority Disciplines Outcome Goal Variances Interventions   Physical Therapy Goal     PT, PT/OT Ongoing (interventions implemented as appropriate)     Description:  Goals to be met by: 3/6/2019     Patient will increase functional independence with mobility by performin. Supine to sit with Set-up [place]  2. Sit to supine with Set-up [place]  3. Sit to stand transfer with Contact Guard Assistance  4. Bed to chair  transfer with Contact Guard Assistance using Rolling Walker  5. Gait  x 75 feet with Contact Guard Assistance using Rolling Walker.                       History:     Past Medical History:   Diagnosis Date    Anemia     Anxiety     Arthritis     Asthma     Atrial fibrillation     Cancer     Cataract     Clotting disorder     COPD (chronic obstructive pulmonary disease)     Coronary artery disease     Degenerative disc disease at L5-S1 level     Depression     Glaucoma     Heart murmur     History of stomach ulcers     Hypertension     Myocardial infarction     Neuromuscular disorder     Thyroid disease     Tuberculosis        Past Surgical History:   Procedure Laterality Date    CHOLECYSTECTOMY      COLON SURGERY      EYE SURGERY      TONSILLECTOMY         Time Tracking:     PT Received On: 02/21/19  PT Start Time: 1417     PT Stop Time: 1427  PT Total Time (min): 10 min     Billable Minutes: Evaluation 10 min      Rafi Sommers, PT  02/21/2019

## 2019-02-21 NOTE — PROGRESS NOTES
[facility]  Cardiology  Progress Note    Patient Name: Makenzie SCHWARTZ Leija  MRN: [id number]  Admission Date: 2/19/2019  Hospital Length of Stay: 2 days  Code Status: Full Code   Attending Physician: Paramjit Bales MD   Primary Care Physician: Karthik Roth MD  Expected Discharge Date:   Principal Problem:Acute on chronic respiratory failure    Subjective:       Interval History:   Patient hypotensive overnight, asymptomatic, however MAPs >65. Did not tolerate BiPAP well; settings were changed. Otherwise no complaints.       Objective:     Vital Signs (Most Recent):  Temp: 98.5 °F (36.9 °C) (02/19/19 2119)  Pulse: 71 (02/20/19 1103)  Resp: 20 (02/20/19 0952)  BP: (!) 113/54 (02/20/19 0950)  SpO2: 98 % (02/20/19 0950) Vital Signs (24h Range):  Temp:  [98.5 °F (36.9 °C)] 98.5 °F (36.9 °C)  Pulse:  [66-95] 71  Resp:  [17-27] 20  SpO2:  [40 %-100 %] 98 %  BP: ()/(46-72) 113/54     Weight: 92.5 kg (204 lb)  Body mass index is 33.95 kg/m².     SpO2: 98 %  O2 Device (Oxygen Therapy): nasal cannula      Intake/Output Summary (Last 24 hours) at 2/20/2019 1125  Last data filed at 2/20/2019 0600  Gross per 24 hour   Intake 320 ml   Output 700 ml   Net -380 ml       Lines/Drains/Airways          None          Physical Exam   Constitutional: She is oriented to person, place, and time. She has a sickly appearance.   Patient not in respiratory distress. However, develops SOB w/ minimal movement in bed.    HENT:   Head: Normocephalic and atraumatic.   Eyes: Conjunctivae are normal. Pupils are equal, round, and reactive to light.   Neck: Neck supple. No JVD present.   Cardiovascular: Normal rate.   Murmur heard.  Pulmonary/Chest: Effort normal. No respiratory distress. She has decreased breath sounds in the right middle field and the right lower field. She has no wheezes.   Abdominal: Soft. Bowel sounds are normal. There is no tenderness. There is no rebound.   Musculoskeletal: She exhibits no edema or  deformity.   Neurological: She is alert and oriented to person, place, and time.   Skin: Skin is warm. No erythema.       Significant Labs: All pertinent lab results from the last 24 hours have been reviewed.    Significant Imaging: reviewed     Assessment and Plan:       * Acute on chronic respiratory failure    Patient acute respiratory failure is multifactorial secondary to: moderate COPD, pulmonary HTN (PA 49), HOCM, PE, MARISOL (CPAP at 4), bilateral pulmonary effusion, hx of RUL cancer s/p chemo&XRT, recently tx for CAP zosyn x 12 days     - treated for PNA at OSH, no signs of infection. No further txt required   - per OSH records patient not tolerating CPAP at night, would get hypoxic and was switched over to BiPAP  - will continue BiPAP 12/8, and obtain ABG in the morning; expecting patient to be d/c on BiPAP      Hypothyroidism    - continue home dose synthroid      Pulmonary HTN    pulmonary artery systolic pressure (PASP)= 49  Mean PAP can be approximated because mPAP = 0.61sPAP + 2  Mean PAP= 31.89 (32)    MILD Pulmonary HTN        HOCM (hypertrophic obstructive cardiomyopathy)    · Hypertrophic cardiomyopathy with asymmetric septal hypertrophy  · Systolic anterior motion of the mitral valve with severe outflow tract left ventricular obstruction. The peak gradient is near 100mm Hg.      - start BB and CCB  - will need outpatient ablation      MARISOL on CPAP    BiPAP qhs 12/8     Type 2 diabetes mellitus    Low dose insulin sliding scale      Pleural effusion, bilateral    Thorac 2/8 consistent w/ transudate (OSH studies)  Thorac 12/2019 cytology negative for malignancy  Bilateral effusions notes on CXR      Chronic pulmonary embolism    Hx of RML embolus diagnosed 12/2018  Start Lovenox 1mg/kg BID  Monitor for bleeding      Acute on chronic diastolic congestive heart failure, NYHA class 3    Echo 2/19/2019  · Hypertrophic cardiomyopathy with asymmetric septal hypertrophy  · Normal left ventricular systolic  function. The estimated ejection fraction is 65%  · Normal right ventricular systolic function.  · Severe left atrial enlargement.  · Systolic anterior motion of the mitral valve with severe outflow tract left ventricular obstruction. The peak gradient is near 100mm Hg.  · The estimated PA systolic pressure is 49 mm Hg  · Intermediate central venous pressure (8 mm Hg).    - will hold off on diuresis; pt is preload dependent   - start BB and CCB       COPD (chronic obstructive pulmonary disease)    Patient fits GOLD Group D/C class  - will start LAMA/ICS/LABA  - consult pulmonary   - duonebs q6 hrs          VTE Risk Mitigation (From admission, onward)        Ordered     enoxaparin injection 90 mg  Every 12 hours (non-standard times)      02/19/19 1517          Gareth Macias MD  Cardiology  [facility]

## 2019-02-21 NOTE — HOSPITAL COURSE
[place] [facility] [place]. Admitted to [facility] for acute diastolic heart failure HOC with pulmonary edema and bilateral pleural effusions. Patient admitted 2/19/19. 2D echo repeated; septal size 2.2cm, ROSAURA with severe outflow tract left ventricular obstruction. The peak gradient is near 100mm Hg. Patient started on BB and CCB. Will need ablation as an outpatient.     Today 2/21/19 patient h/h noted to be 7 down from 8.5 and subcutaneous ecchymosis noted around her lower abdomen. Lovenox stopped.

## 2019-02-21 NOTE — PLAN OF CARE
Problem: Physical Therapy Goal  Goal: Physical Therapy Goal  Goals to be met by: 3/6/2019     Patient will increase functional independence with mobility by performin. Supine to sit with Set-up [place]  2. Sit to supine with Set-up [place]  3. Sit to stand transfer with Contact Guard Assistance  4. Bed to chair transfer with Contact Guard Assistance using Rolling Walker  5. Gait  x 75 feet with Contact Guard Assistance using Rolling Walker.     Outcome: Ongoing (interventions implemented as appropriate)  Eval completed and POC established    Rafi Sommers PT,DPT  2019

## 2019-02-21 NOTE — ASSESSMENT & PLAN NOTE
Patient acute respiratory failure is multifactorial secondary to: moderate COPD, pulmonary HTN (PA 49), HOCM, PE, MARSIOL (CPAP at 4), bilateral pulmonary effusion, hx of RUL cancer s/p chemo&XRT, recently tx for CAP zosyn x 12 days     - treated for PNA at OSH, no signs of infection. No further txt required   - per OSH records patient not tolerating CPAP at night, would get hypoxic and was switched over to BiPAP  - will continue BiPAP qhs  - pulmonary consulted; appreciate assistance - signed off

## 2019-02-21 NOTE — PLAN OF CARE
Problem: Occupational Therapy Goal  Goal: Occupational Therapy Goal  Goals to be met by: 3/21/2019    Patient will increase functional independence with ADLs by performing:    Toileting from toilet with Minimal Assistance for hygiene and clothing management.   Bathing from  standing at sink with Minimal Assistance.  Stand pivot transfers with Minimal Assistance.  Squat pivot transfers with Minimal Assistance.  Toilet transfer to bedside commode with Contact Guard Assistance.     Outcome: Ongoing (interventions implemented as appropriate)  Eval complete. Pt tolerated session well. Initiate OT POC.

## 2019-02-21 NOTE — PROGRESS NOTES
[facility]  Cardiology  Progress Note    Patient Name: Makenzie SCHWARTZ Leija  MRN: [id number]  Admission Date: 2/19/2019  Hospital Length of Stay: 2 days  Code Status: Full Code   Attending Physician: Paramjit Bales MD   Primary Care Physician: Karthik Roth MD  Expected Discharge Date:   Principal Problem:Acute on chronic respiratory failure    Subjective:     Hospital Course:   Transferred to [facility] for higher level of cardiac care. Admitted to [facility] for acute diastolic heart failure HOCM with pulmonary edema and bilateral pleural effusions. Patient admitted 2/19/19. 2D echo repeated; septal size 2.2cm, ROSAURA with severe outflow tract left ventricular obstruction. The peak gradient is near 100mm Hg. Patient started on BB and CCB. Will need ablation as an outpatient.     Today 2/21/19 patient h/h noted to be 7 down from 8.5 and subcutaneous ecchymosis noted around her lower abdomen. Lovenox stopped.       Interval History:   NAEON. Pt complaining of chest tightness in the am; EKG repeated. CBC h/h 7.0 (down from 8.5). SOB patient at baseline at rest; worse with movement.     Objective:     Vital Signs (Most Recent):  Temp: 98.7 °F (37.1 °C) (02/21/19 1253)  Pulse: 63 (02/21/19 1500)  Resp: 20 (02/21/19 1415)  BP: (!) 96/40 (02/21/19 1415)  SpO2: 96 % (02/21/19 1415) Vital Signs (24h Range):  Temp:  [97.2 °F (36.2 °C)-98.7 °F (37.1 °C)] 98.7 °F (37.1 °C)  Pulse:  [61-91] 63  Resp:  [18-29] 20  SpO2:  [95 %-100 %] 96 %  BP: ()/(40-71) 96/40     Weight: 90.6 kg (199 lb 11.8 oz)  Body mass index is 33.24 kg/m².     SpO2: 96 %  O2 Device (Oxygen Therapy): nasal cannula w/ humidification      Intake/Output Summary (Last 24 hours) at 2/21/2019 1548  Last data filed at 2/21/2019 0600  Gross per 24 hour   Intake --   Output 1650 ml   Net -1650 ml       Lines/Drains/Airways          None          Physical Exam   Constitutional: She is oriented to person, place, and time.   Patient not in  respiratory distress. However, develops SOB w/ movement in bed.    HENT:   Head: Normocephalic and atraumatic.   Eyes: Conjunctivae are normal. Pupils are equal, round, and reactive to light.   Neck: Neck supple. No JVD present.   Cardiovascular: Normal rate.   Murmur heard.  Pulmonary/Chest: Effort normal. No respiratory distress. She has decreased breath sounds. She has no wheezes.   Abdominal: Soft. Bowel sounds are normal. There is no tenderness. There is no rebound.       Musculoskeletal: She exhibits no edema or deformity.   Neurological: She is alert and oriented to person, place, and time.   Skin: Skin is warm. No erythema.       Significant Labs: All pertinent lab results from the last 24 hours have been reviewed.    Significant Imaging: reviewed     Assessment and Plan:     * Acute on chronic respiratory failure    Patient acute respiratory failure is multifactorial secondary to: moderate COPD, pulmonary HTN (PA 49), HOCM, PE, MARISOL (CPAP at 4), bilateral pulmonary effusion, hx of RUL cancer s/p chemo&XRT, recently tx for CAP zosyn x 12 days     - treated for PNA at OSH, no signs of infection. 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     Currently patient's h/h is dropping concern for bleed, are discontinue her AC. Although she had PE in dec/2018; they were provoked and  A recent CTA feb/2019 was negative for PE. The risks of AC currently outweigh the benefits.      Hypothyroidism    - continue home dose synthroid      Pulmonary HTN    pulmonary artery systolic pressure (PASP)= 49  Mean PAP can be approximated because mPAP = 0.61sPAP + 2  Mean PAP= 31.89 (32)    MILD Pulmonary HTN         HOCM (hypertrophic obstructive cardiomyopathy)    · Hypertrophic cardiomyopathy with asymmetric septal hypertrophy  · Systolic anterior motion of the mitral valve with severe outflow tract left ventricular obstruction. The peak gradient is near 100mm Hg.      - start BB and CCB  - uptitrate medications as tolerated   - will need outpatient interventional cardiology follow-up for ablation      MARISOL on CPAP    BiPAP qhs 12/8     Type 2 diabetes mellitus    Low dose insulin sliding scale      Pleural effusion, bilateral    Thorac 2/8 consistent w/ transudate (OSH studies)  Thorac 12/2019 cytology negative for malignancy  Bilateral effusions notes on CXR      Chronic pulmonary embolism    Hx of RML embolus diagnosed 12/2018    - h/h down trending 7.0 (8.5 on admission)  - discontinue lovenox   - transfuse 1U PRBC  - hold off on AC        Acute on chronic diastolic congestive heart failure, NYHA class 3    Echo 2/19/2019  · Hypertrophic cardiomyopathy with asymmetric septal hypertrophy  · Normal left ventricular systolic function. The estimated ejection fraction is 65%  · Normal right ventricular systolic function.  · Severe left atrial enlargement.  · Systolic anterior motion of the mitral valve with severe outflow tract left ventricular obstruction. The peak gradient is near 100mm Hg.  · The estimated PA systolic pressure is 49 mm Hg  · Intermediate central venous pressure (8 mm Hg).    - will hold off on diuresis; pt is preload dependent   - start BB and CCB       COPD (chronic obstructive pulmonary disease)    Patient fits GOLD Group D/C class  - will start LAMA/ICS/LABA  - consult pulmonary   - duonebs q6 hrs          VTE Risk Mitigation (From admission, onward)    None          Gareth Macias MD  Cardiology  [facility]

## 2019-02-21 NOTE — SUBJECTIVE & OBJECTIVE
Interval History:   NAEON. Pt complaining of chest tightness in the am; EKG repeated. CBC h/h 7.0 (down from 8.5). SOB patient at baseline at rest; worse with movement.     Objective:     Vital Signs (Most Recent):  Temp: 98.7 °F (37.1 °C) (02/21/19 1253)  Pulse: 63 (02/21/19 1500)  Resp: 20 (02/21/19 1415)  BP: (!) 96/40 (02/21/19 1415)  SpO2: 96 % (02/21/19 1415) Vital Signs (24h Range):  Temp:  [97.2 °F (36.2 °C)-98.7 °F (37.1 °C)] 98.7 °F (37.1 °C)  Pulse:  [61-91] 63  Resp:  [18-29] 20  SpO2:  [95 %-100 %] 96 %  BP: ()/(40-71) 96/40     Weight: 90.6 kg (199 lb 11.8 oz)  Body mass index is 33.24 kg/m².     SpO2: 96 %  O2 Device (Oxygen Therapy): nasal cannula w/ humidification      Intake/Output Summary (Last 24 hours) at 2/21/2019 1548  Last data filed at 2/21/2019 0600  Gross per 24 hour   Intake --   Output 1650 ml   Net -1650 ml       Lines/Drains/Airways          None          Physical Exam   Constitutional: She is oriented to person, place, and time.   Patient not in respiratory distress. However, develops SOB w/ movement in bed.    HENT:   Head: Normocephalic and atraumatic.   Eyes: Conjunctivae are normal. Pupils are equal, round, and reactive to light.   Neck: Neck supple. No JVD present.   Cardiovascular: Normal rate.   Murmur heard.  Pulmonary/Chest: Effort normal. No respiratory distress. She has decreased breath sounds. She has no wheezes.   Abdominal: Soft. Bowel sounds are normal. There is no tenderness. There is no rebound.       Musculoskeletal: She exhibits no edema or deformity.   Neurological: She is alert and oriented to person, place, and time.   Skin: Skin is warm. No erythema.       Significant Labs: All pertinent lab results from the last 24 hours have been reviewed.    Significant Imaging: reviewed

## 2019-02-22 LAB
ALBUMIN SERPL BCP-MCNC: 2.7 G/DL
ALP SERPL-CCNC: 70 U/L
ALT SERPL W/O P-5'-P-CCNC: 10 U/L
ANION GAP SERPL CALC-SCNC: 7 MMOL/L
APTT BLDCRRT: 25.9 SEC
APTT BLDCRRT: 28.3 SEC
AST SERPL-CCNC: 9 U/L
BASOPHILS # BLD AUTO: 0.07 K/UL
BASOPHILS # BLD AUTO: 0.07 K/UL
BASOPHILS NFR BLD: 0.7 %
BASOPHILS NFR BLD: 0.8 %
BILIRUB SERPL-MCNC: 0.7 MG/DL
BUN SERPL-MCNC: 22 MG/DL
CALCIUM SERPL-MCNC: 9.6 MG/DL
CHLORIDE SERPL-SCNC: 105 MMOL/L
CO2 SERPL-SCNC: 24 MMOL/L
CREAT SERPL-MCNC: 0.9 MG/DL
DIFFERENTIAL METHOD: ABNORMAL
DIFFERENTIAL METHOD: ABNORMAL
EOSINOPHIL # BLD AUTO: 0.2 K/UL
EOSINOPHIL # BLD AUTO: 0.3 K/UL
EOSINOPHIL NFR BLD: 1.9 %
EOSINOPHIL NFR BLD: 3.2 %
ERYTHROCYTE [DISTWIDTH] IN BLOOD BY AUTOMATED COUNT: 15.8 %
ERYTHROCYTE [DISTWIDTH] IN BLOOD BY AUTOMATED COUNT: 16 %
EST. GFR  (AFRICAN AMERICAN): >60 ML/MIN/1.73 M^2
EST. GFR  (NON AFRICAN AMERICAN): >60 ML/MIN/1.73 M^2
GLUCOSE SERPL-MCNC: 129 MG/DL
HCT VFR BLD AUTO: 25.9 %
HCT VFR BLD AUTO: 25.9 %
HCT VFR BLD AUTO: 26.1 %
HGB BLD-MCNC: 7.8 G/DL
HGB BLD-MCNC: 7.8 G/DL
HGB BLD-MCNC: 8.2 G/DL
IMM GRANULOCYTES # BLD AUTO: 0.03 K/UL
IMM GRANULOCYTES # BLD AUTO: 0.07 K/UL
IMM GRANULOCYTES NFR BLD AUTO: 0.3 %
IMM GRANULOCYTES NFR BLD AUTO: 0.7 %
INR PPP: 0.9
LYMPHOCYTES # BLD AUTO: 0.4 K/UL
LYMPHOCYTES # BLD AUTO: 0.7 K/UL
LYMPHOCYTES NFR BLD: 4.3 %
LYMPHOCYTES NFR BLD: 7.6 %
MAGNESIUM SERPL-MCNC: 2.1 MG/DL
MCH RBC QN AUTO: 28.3 PG
MCH RBC QN AUTO: 29.2 PG
MCHC RBC AUTO-ENTMCNC: 30.1 G/DL
MCHC RBC AUTO-ENTMCNC: 31.4 G/DL
MCV RBC AUTO: 93 FL
MCV RBC AUTO: 94 FL
MONOCYTES # BLD AUTO: 0.7 K/UL
MONOCYTES # BLD AUTO: 0.8 K/UL
MONOCYTES NFR BLD: 7.2 %
MONOCYTES NFR BLD: 9.7 %
NEUTROPHILS # BLD AUTO: 6.8 K/UL
NEUTROPHILS # BLD AUTO: 8.4 K/UL
NEUTROPHILS NFR BLD: 78.4 %
NEUTROPHILS NFR BLD: 85.2 %
NRBC BLD-RTO: 0 /100 WBC
NRBC BLD-RTO: 0 /100 WBC
PLATELET # BLD AUTO: 223 K/UL
PLATELET # BLD AUTO: 225 K/UL
PMV BLD AUTO: 10.7 FL
PMV BLD AUTO: 11.3 FL
POCT GLUCOSE: 156 MG/DL (ref 70–110)
POCT GLUCOSE: 166 MG/DL (ref 70–110)
POCT GLUCOSE: 212 MG/DL (ref 70–110)
POTASSIUM SERPL-SCNC: 4.3 MMOL/L
PROT SERPL-MCNC: 6.3 G/DL
PROTHROMBIN TIME: 9.8 SEC
RBC # BLD AUTO: 2.76 M/UL
RBC # BLD AUTO: 2.81 M/UL
SODIUM SERPL-SCNC: 136 MMOL/L
WBC # BLD AUTO: 8.63 K/UL
WBC # BLD AUTO: 9.9 K/UL

## 2019-02-22 PROCEDURE — 94640 AIRWAY INHALATION TREATMENT: CPT

## 2019-02-22 PROCEDURE — 85730 THROMBOPLASTIN TIME PARTIAL: CPT | Mod: 91

## 2019-02-22 PROCEDURE — 99233 PR SUBSEQUENT HOSPITAL CARE,LEVL III: ICD-10-PCS | Mod: ,,, | Performed by: INTERNAL MEDICINE

## 2019-02-22 PROCEDURE — 11000001 HC ACUTE MED/SURG PRIVATE ROOM

## 2019-02-22 PROCEDURE — 99232 PR SUBSEQUENT HOSPITAL CARE,LEVL II: ICD-10-PCS | Mod: GC,,, | Performed by: INTERNAL MEDICINE

## 2019-02-22 PROCEDURE — 99233 SBSQ HOSP IP/OBS HIGH 50: CPT | Mod: ,,, | Performed by: INTERNAL MEDICINE

## 2019-02-22 PROCEDURE — 99900035 HC TECH TIME PER 15 MIN (STAT)

## 2019-02-22 PROCEDURE — 94761 N-INVAS EAR/PLS OXIMETRY MLT: CPT

## 2019-02-22 PROCEDURE — 25000003 PHARM REV CODE 250: Performed by: STUDENT IN AN ORGANIZED HEALTH CARE EDUCATION/TRAINING PROGRAM

## 2019-02-22 PROCEDURE — 25000003 PHARM REV CODE 250: Performed by: INTERNAL MEDICINE

## 2019-02-22 PROCEDURE — 25000242 PHARM REV CODE 250 ALT 637 W/ HCPCS: Performed by: INTERNAL MEDICINE

## 2019-02-22 PROCEDURE — 85025 COMPLETE CBC W/AUTO DIFF WBC: CPT

## 2019-02-22 PROCEDURE — 27000221 HC OXYGEN, UP TO 24 HOURS

## 2019-02-22 PROCEDURE — 25000242 PHARM REV CODE 250 ALT 637 W/ HCPCS: Performed by: STUDENT IN AN ORGANIZED HEALTH CARE EDUCATION/TRAINING PROGRAM

## 2019-02-22 PROCEDURE — 36415 COLL VENOUS BLD VENIPUNCTURE: CPT

## 2019-02-22 PROCEDURE — 83735 ASSAY OF MAGNESIUM: CPT

## 2019-02-22 PROCEDURE — 99232 SBSQ HOSP IP/OBS MODERATE 35: CPT | Mod: GC,,, | Performed by: INTERNAL MEDICINE

## 2019-02-22 PROCEDURE — 63600175 PHARM REV CODE 636 W HCPCS: Performed by: STUDENT IN AN ORGANIZED HEALTH CARE EDUCATION/TRAINING PROGRAM

## 2019-02-22 PROCEDURE — 85610 PROTHROMBIN TIME: CPT

## 2019-02-22 PROCEDURE — 97530 THERAPEUTIC ACTIVITIES: CPT

## 2019-02-22 PROCEDURE — 63600175 PHARM REV CODE 636 W HCPCS: Performed by: INTERNAL MEDICINE

## 2019-02-22 PROCEDURE — 94660 CPAP INITIATION&MGMT: CPT

## 2019-02-22 PROCEDURE — 97116 GAIT TRAINING THERAPY: CPT

## 2019-02-22 PROCEDURE — 80053 COMPREHEN METABOLIC PANEL: CPT

## 2019-02-22 RX ORDER — HEPARIN SODIUM,PORCINE/D5W 25000/250
12 INTRAVENOUS SOLUTION INTRAVENOUS CONTINUOUS
Status: DISCONTINUED | OUTPATIENT
Start: 2019-02-22 | End: 2019-02-24

## 2019-02-22 RX ORDER — FUROSEMIDE 10 MG/ML
20 INJECTION INTRAMUSCULAR; INTRAVENOUS ONCE
Status: COMPLETED | OUTPATIENT
Start: 2019-02-22 | End: 2019-02-22

## 2019-02-22 RX ORDER — ALBUTEROL SULFATE 90 UG/1
2 AEROSOL, METERED RESPIRATORY (INHALATION) EVERY 6 HOURS PRN
Status: DISCONTINUED | OUTPATIENT
Start: 2019-02-22 | End: 2019-02-28

## 2019-02-22 RX ORDER — VERAPAMIL HYDROCHLORIDE 80 MG/1
80 TABLET ORAL 3 TIMES DAILY
Status: DISCONTINUED | OUTPATIENT
Start: 2019-02-22 | End: 2019-02-28

## 2019-02-22 RX ORDER — IPRATROPIUM BROMIDE AND ALBUTEROL SULFATE 2.5; .5 MG/3ML; MG/3ML
3 SOLUTION RESPIRATORY (INHALATION) EVERY 4 HOURS PRN
Status: DISCONTINUED | OUTPATIENT
Start: 2019-02-22 | End: 2019-02-28

## 2019-02-22 RX ORDER — ALPRAZOLAM 0.5 MG/1
0.5 TABLET ORAL ONCE
Status: COMPLETED | OUTPATIENT
Start: 2019-02-22 | End: 2019-02-22

## 2019-02-22 RX ADMIN — ACETYLCYSTEINE 4 ML: 100 SOLUTION ORAL; RESPIRATORY (INHALATION) at 07:02

## 2019-02-22 RX ADMIN — IPRATROPIUM BROMIDE AND ALBUTEROL SULFATE 3 ML: .5; 3 SOLUTION RESPIRATORY (INHALATION) at 12:02

## 2019-02-22 RX ADMIN — GABAPENTIN 100 MG: 100 CAPSULE ORAL at 08:02

## 2019-02-22 RX ADMIN — HEPARIN SODIUM 14 UNITS/KG/HR: 10000 INJECTION, SOLUTION INTRAVENOUS at 09:02

## 2019-02-22 RX ADMIN — VERAPAMIL HYDROCHLORIDE 80 MG: 80 TABLET, FILM COATED ORAL at 08:02

## 2019-02-22 RX ADMIN — METOPROLOL TARTRATE 50 MG: 50 TABLET ORAL at 08:02

## 2019-02-22 RX ADMIN — VERAPAMIL HYDROCHLORIDE 40 MG: 40 TABLET ORAL at 10:02

## 2019-02-22 RX ADMIN — GABAPENTIN 100 MG: 100 CAPSULE ORAL at 10:02

## 2019-02-22 RX ADMIN — IPRATROPIUM BROMIDE AND ALBUTEROL SULFATE 3 ML: .5; 3 SOLUTION RESPIRATORY (INHALATION) at 04:02

## 2019-02-22 RX ADMIN — HYDROCODONE BITARTRATE AND ACETAMINOPHEN 1 TABLET: 5; 325 TABLET ORAL at 10:02

## 2019-02-22 RX ADMIN — TIOTROPIUM BROMIDE 18 MCG: 18 CAPSULE ORAL; RESPIRATORY (INHALATION) at 10:02

## 2019-02-22 RX ADMIN — HEPARIN SODIUM 12 UNITS/KG/HR: 10000 INJECTION, SOLUTION INTRAVENOUS at 02:02

## 2019-02-22 RX ADMIN — FUROSEMIDE 20 MG: 10 INJECTION, SOLUTION INTRAVENOUS at 10:02

## 2019-02-22 RX ADMIN — VERAPAMIL HYDROCHLORIDE 80 MG: 80 TABLET, FILM COATED ORAL at 04:02

## 2019-02-22 RX ADMIN — ACETYLCYSTEINE 4 ML: 100 SOLUTION ORAL; RESPIRATORY (INHALATION) at 04:02

## 2019-02-22 RX ADMIN — INSULIN ASPART 2 UNITS: 100 INJECTION, SOLUTION INTRAVENOUS; SUBCUTANEOUS at 07:02

## 2019-02-22 RX ADMIN — HYDROCODONE BITARTRATE AND ACETAMINOPHEN 1 TABLET: 5; 325 TABLET ORAL at 07:02

## 2019-02-22 RX ADMIN — IPRATROPIUM BROMIDE AND ALBUTEROL SULFATE 3 ML: .5; 3 SOLUTION RESPIRATORY (INHALATION) at 07:02

## 2019-02-22 RX ADMIN — METOPROLOL TARTRATE 50 MG: 50 TABLET ORAL at 10:02

## 2019-02-22 RX ADMIN — FLUTICASONE PROPIONATE 50 MCG: 50 SPRAY, METERED NASAL at 10:02

## 2019-02-22 RX ADMIN — LEVOTHYROXINE SODIUM 112 MCG: 112 TABLET ORAL at 05:02

## 2019-02-22 RX ADMIN — MONTELUKAST SODIUM 10 MG: 10 TABLET, FILM COATED ORAL at 08:02

## 2019-02-22 RX ADMIN — ALPRAZOLAM 0.5 MG: 0.5 TABLET ORAL at 12:02

## 2019-02-22 RX ADMIN — ALPRAZOLAM 0.5 MG: 0.5 TABLET ORAL at 07:02

## 2019-02-22 RX ADMIN — HYDROCODONE BITARTRATE AND ACETAMINOPHEN 1 TABLET: 5; 325 TABLET ORAL at 01:02

## 2019-02-22 RX ADMIN — SODIUM CHLORIDE 125 MG: 9 INJECTION, SOLUTION INTRAVENOUS at 10:02

## 2019-02-22 NOTE — PLAN OF CARE
Problem: Adult Inpatient Plan of Care  Goal: Plan of Care Review  Outcome: Ongoing (interventions implemented as appropriate)  Pt free of fall this shift. Pain assessed and prn norco given; pt verbalized moderate relief of pain. Cbg monitored and prn insulin given as ordered to manage pt DM2. Pt aaox4. Afebrile. Blood pressure stable at end of shift. Will continue to monitor pt.

## 2019-02-22 NOTE — PROGRESS NOTES
[facility]  Cardiology  Progress Note    Patient Name: Makenzie SCHWARTZ Leija  MRN: [id number]  Admission Date: 2/19/2019  Hospital Length of Stay: 3 days  Code Status: Full Code   Attending Physician: Corina Dias MD   Primary Care Physician: Karthik Roth MD  Expected Discharge Date:   Principal Problem:Acute on chronic respiratory failure    Subjective:     Hospital Course:   Transferred to [facility] [place] [facility] for higher level of cardiac care. Admitted to [facility] for acute diastolic heart failure HOCM with pulmonary edema and bilateral pleural effusions. Patient admitted 2/19/19. 2D echo repeated; septal size 2.2cm, ROSAURA with severe outflow tract left ventricular obstruction. The peak gradient is near 100mm Hg. Patient started on BB and CCB. Will need ablation as an outpatient.     Today 2/21/19 patient h/h noted to be 7 down from 8.5 and subcutaneous ecchymosis noted around her lower abdomen. Lovenox stopped.       Interval History:   NAEON. Patient received 1UPBRC. H/h improved.  Complaining of SOB.       Objective:     Vital Signs (Most Recent):  Temp: 98.1 °F (36.7 °C) (02/22/19 1626)  Pulse: 78 (02/22/19 1626)  Resp: 19 (02/22/19 1626)  BP: 117/63 (02/22/19 1626)  SpO2: 99 % (02/22/19 1626) Vital Signs (24h Range):  Temp:  [96.9 °F (36.1 °C)-98.6 °F (37 °C)] 98.1 °F (36.7 °C)  Pulse:  [66-90] 78  Resp:  [16-27] 19  SpO2:  [94 %-99 %] 99 %  BP: (100-137)/(53-75) 117/63     Weight: 93 kg (205 lb 0.4 oz)  Body mass index is 34.12 kg/m².     SpO2: 99 %  O2 Device (Oxygen Therapy): nasal cannula w/ humidification      Intake/Output Summary (Last 24 hours) at 2/22/2019 1710  Last data filed at 2/22/2019 1153  Gross per 24 hour   Intake 394 ml   Output 2100 ml   Net -1706 ml       Lines/Drains/Airways     Drain            Female External Urinary Catheter 02/21/19 0800 1 day                Physical Exam   Constitutional: She is oriented to person, place, and time.   Patient not in respiratory distress.  However, develops SOB w/ movement in bed.    HENT:   Head: Normocephalic and atraumatic.   Eyes: Conjunctivae are normal. Pupils are equal, round, and reactive to light.   Neck: Neck supple. No JVD present.   Cardiovascular: Normal rate.   Murmur heard.  Pulmonary/Chest: Effort normal. No respiratory distress. She has decreased breath sounds. She has no wheezes.   Abdominal: Soft. Bowel sounds are normal. There is no tenderness. There is no rebound.       Musculoskeletal: She exhibits no edema or deformity.   Neurological: She is alert and oriented to person, place, and time.   Skin: Skin is warm. No erythema.       Significant Labs: All pertinent lab results from the last 24 hours have been reviewed.    Significant Imaging: reviewed    Assessment and Plan:       * Acute on chronic respiratory failure    Patient acute respiratory failure is multifactorial secondary to: moderate COPD, pulmonary HTN (PA 49), HOCM, PE, MARISOL (CPAP at 4), bilateral pulmonary effusion, hx of RUL cancer s/p chemo&XRT, recently tx for CAP zosyn x 12 days     From cardiology perspective patient w/ HOCM      HOCM (hypertrophic obstructive cardiomyopathy)    · Hypertrophic cardiomyopathy with asymmetric septal hypertrophy  · Systolic anterior motion of the mitral valve with severe outflow tract left ventricular obstruction. The peak gradient is near 100mm Hg.      - continue BB and CCB  - uptitrate medications as tolerated   - will need outpatient interventional cardiology follow-up for ablation (discussed with Dr. Schneider)   - give IV lasix 20mg x 1; would not repeat and monitor UOP and BP     Acute on chronic diastolic congestive heart failure, NYHA class 3    Echo 2/19/2019  · Hypertrophic cardiomyopathy with asymmetric septal hypertrophy  · Normal left ventricular systolic function. The estimated ejection fraction is 65%  · Normal right ventricular systolic function.  · Severe left atrial enlargement.  · Systolic anterior motion of the  mitral valve with severe outflow tract left ventricular obstruction. The peak gradient is near 100mm Hg.  · The estimated PA systolic pressure is 49 mm Hg  · Intermediate central venous pressure (8 mm Hg).    Refer to [facility]            Cardiology will sign-off    VTE Risk Mitigation (From admission, onward)        Ordered     heparin 25,000 units in dextrose 5% 250 ml (100 units/mL) infusion MINIMAL INTENSITY nomogram - [facility]  Continuous      02/22/19 1236          Gareth Macias MD  Cardiology  [facility]

## 2019-02-22 NOTE — PT/OT/SLP PROGRESS
Physical Therapy Treatment    Patient Name:  Makenzie Leija   MRN:  [id number]    Recommendations:     Discharge Recommendations:  ( PT)   Discharge Equipment Recommendations: none   Barriers to discharge: None    Assessment:     Makenzie Leija is a 78 y.o. female admitted with a medical diagnosis of Acute on chronic respiratory failure.  She presents with the following impairments/functional limitations:  weakness, impaired endurance, impaired functional mobilty, gait instability, impaired balance, impaired cardiopulmonary response to activity Pt. cooperative and tolerated treatment well. Pt. progressing with mobility.    Rehab Prognosis: Good; patient would benefit from acute skilled PT services to address these deficits and reach maximum level of function.    Recent Surgery: * No surgery found *      Plan:     During this hospitalization, patient to be seen 3 x/week to address the identified rehab impairments via gait training, therapeutic activities, therapeutic exercises and progress toward the following goals:    · Plan of Care Expires:  03/21/19    Subjective     Chief Complaint: weakness  Patient/Family Comments/goals: to get stronger  Pain/Comfort:  · Pain Rating 1: 0/10  · Pain Rating Post-Intervention 1: 0/10      Objective:     Communicated with nursing prior to session.  Patient found supine in bed pulse ox (continuous), telemetry, peripheral IV, oxygen, PureWick  upon PT entry to room.     General Precautions: Standard, fall   Orthopedic Precautions:N/A   Braces:       Functional Mobility:  · Bed Mobility:     · Rolling Left:  stand by assistance  · Scooting: stand by assistance  · Supine to Sit: stand by assistance  · Sit to Supine: stand by assistance  · Transfers:     · Sit to Stand:  contact guard assistance with rolling walker  · Gait: 150' with rollator and CGA with portable oxygen @ 6L  · Balance: fair      AM-PAC 6 CLICK MOBILITY  Turning over in bed (including adjusting bedclothes, sheets and  blankets)?: 4  Sitting down on and standing up from a chair with arms (e.g., wheelchair, bedside commode, etc.): 3  Moving from lying on back to sitting on the side of the bed?: 4  Moving to and from a bed to a chair (including a wheelchair)?: 3  Need to walk in hospital room?: 3  Climbing 3-5 steps with a railing?: 3  Basic Mobility Total Score: 20       Therapeutic Activities and Exercises:   Discussed pt.'s progress, goals, therapy needs, and POC.    Patient left supine with all lines intact and call button in reach..    GOALS:   Multidisciplinary Problems     Physical Therapy Goals        Problem: Physical Therapy Goal    Goal Priority Disciplines Outcome Goal Variances Interventions   Physical Therapy Goal     PT, PT/OT Ongoing (interventions implemented as appropriate)     Description:  Goals to be met by: 3/6/2019     Patient will increase functional independence with mobility by performin. Supine to sit with Set-up [place]  2. Sit to supine with Set-up [place]  3. Sit to stand transfer with Contact Guard Assistance - Met       Revised: Sit to stand transfer with Supervision - Not Met  4. Bed to chair transfer with Contact Guard Assistance using Rolling Walker - Met      Revised: Bed to chair transfer with Supervision using Rolling Walker - Not Met  5. Gait  x 75 feet with Contact Guard Assistance using Rolling Walker. - Met      Revised: Gait  x 200 feet with Supervision using Rolling Walker. - Not Met                       Time Tracking:     PT Received On: 19  PT Start Time: 1553     PT Stop Time: 1616  PT Total Time (min): 23 min     Billable Minutes: Gait Training 15 and Therapeutic Activity 8    Treatment Type: Treatment  PT/PTA: PT           Baldemar Schneider, PT  2019

## 2019-02-22 NOTE — PROGRESS NOTES
Hospital Medicine   Progress note   Team: [facility] HOSP MED O Corina Dias MD   Admit Date: 2/19/2019   JERRY    Code status: Full Code   Principal Problem: Acute on chronic respiratory failure     Interval hx:  Patient transferred from Cardiology team 02/21/2019.  Patient given 1 unit PRBCs overnight with improvement in hemoglobin to 8.2.  Patient denies any bleeding overnight.  She denies any chest pain.  She does note some shortness of breath today and has crackles on exam.  Afebrile.    ROS   Constitutional: Negative for chills, fever. +fatigue  HENT: Negative for sore throat, trouble swallowing.  +nosebleeds  Eyes: Negative for photophobia, visual disturbance.   Respiratory: +cough, +shortness of breath.    Cardiovascular: Negative for chest pain, palpitations, leg swelling.   Gastrointestinal: Negative for abdominal pain, constipation, diarrhea, nausea, vomiting.   Endocrine: Negative for cold intolerance, heat intolerance.   Genitourinary: Negative for dysuria, frequency.   Musculoskeletal: Negative for arthralgias, myalgias.   Skin: Negative for rash, erythema , +wounds on abdomen from lovenox injections  Neurological: Negative for dizziness, syncope, light-headedness. +weakness  Psychiatric/Behavioral: Negative for confusion, hallucinations, anxiety  All other systems reviewed and are negative.    PEx   Temp:  [96.9 °F (36.1 °C)-98.6 °F (37 °C)]   Pulse:  [66-90]   Resp:  [16-27]   BP: (100-137)/(53-75)   SpO2:  [94 %-99 %]      I & O (Last 24H):     Intake/Output Summary (Last 24 hours) at 2/22/2019 1714  Last data filed at 2/22/2019 1153  Gross per 24 hour   Intake 394 ml   Output 2100 ml   Net -1706 ml       General appearance: no distress, alert and oriented x 3, chronically ill-appearing  HEENT:  conjunctivae/corneas clear, PERRL, mucous membranes moist, [place]  Neck: supple, thyroid not enlarged  Pulm:   normal respiratory effort, decreased breath sounds in bases with crackles and few wheezes, no  rhonchi  Card: RRR, S1, S2 normal, no murmur, click, rub or gallop  Abd: soft, NT, ND, BS present; no masses, no organomegaly  Ext: no c/c/e  Pulses: 2+, symmetric  Skin: color, texture, turgor normal.  Healing ecchymoses noted over abdomen, mildly tender to palpation  Neuro: CN II-XII grossly intact, no focal numbness or weakness, normal strength and tone   Psych: normal mood and affect      Recent Results (from the past 24 hour(s))   Type & Screen    Collection Time: 02/21/19  5:18 PM   Result Value Ref Range    Group & Rh O POS     Indirect [place] NEG    Protime-INR    Collection Time: 02/21/19  5:18 PM   Result Value Ref Range    Prothrombin Time 10.0 9.0 - 12.5 sec    INR 0.9 0.8 - 1.2   Hemoglobin    Collection Time: 02/22/19  6:47 AM   Result Value Ref Range    Hemoglobin 7.8 (L) 12.0 - 16.0 g/dL   Hematocrit    Collection Time: 02/22/19  6:47 AM   Result Value Ref Range    Hematocrit 25.9 (L) 37.0 - 48.5 %   Comprehensive metabolic panel    Collection Time: 02/22/19  6:47 AM   Result Value Ref Range    Sodium 136 136 - 145 mmol/L    Potassium 4.3 3.5 - 5.1 mmol/L    Chloride 105 95 - 110 mmol/L    CO2 24 23 - 29 mmol/L    Glucose 129 (H) 70 - 110 mg/dL    BUN, Bld 22 8 - 23 mg/dL    Creatinine 0.9 0.5 - 1.4 mg/dL    Calcium 9.6 8.7 - 10.5 mg/dL    Total Protein 6.3 6.0 - 8.4 g/dL    Albumin 2.7 (L) 3.5 - 5.2 g/dL    Total Bilirubin 0.7 0.1 - 1.0 mg/dL    Alkaline Phosphatase 70 55 - 135 U/L    AST 9 (L) 10 - 40 U/L    ALT 10 10 - 44 U/L    Anion Gap 7 (L) 8 - 16 mmol/L    eGFR if African American >60.0 >60 mL/min/1.73 m^2    eGFR if non African American >60.0 >60 mL/min/1.73 m^2   Magnesium    Collection Time: 02/22/19  6:47 AM   Result Value Ref Range    Magnesium 2.1 1.6 - 2.6 mg/dL   CBC auto differential    Collection Time: 02/22/19  6:47 AM   Result Value Ref Range    WBC 8.63 3.90 - 12.70 K/uL    RBC 2.76 (L) 4.00 - 5.40 M/uL    Hemoglobin 7.8 (L) 12.0 - 16.0 g/dL    Hematocrit 25.9 (L) 37.0 - 48.5 %     MCV 94 82 - 98 fL    MCH 28.3 27.0 - 31.0 pg    MCHC 30.1 (L) 32.0 - 36.0 g/dL    RDW 16.0 (H) 11.5 - 14.5 %    Platelets 223 150 - 350 K/uL    MPV 11.3 9.2 - 12.9 fL    Immature Granulocytes 0.3 0.0 - 0.5 %    Gran # (ANC) 6.8 1.8 - 7.7 K/uL    Immature Grans (Abs) 0.03 0.00 - 0.04 K/uL    Lymph # 0.7 (L) 1.0 - 4.8 K/uL    Mono # 0.8 0.3 - 1.0 K/uL    Eos # 0.3 0.0 - 0.5 K/uL    Baso # 0.07 0.00 - 0.20 K/uL    nRBC 0 0 /100 WBC    Gran% 78.4 (H) 38.0 - 73.0 %    Lymph% 7.6 (L) 18.0 - 48.0 %    Mono% 9.7 4.0 - 15.0 %    Eosinophil% 3.2 0.0 - 8.0 %    Basophil% 0.8 0.0 - 1.9 %    Differential Method Automated    POCT glucose    Collection Time: 02/22/19  7:53 AM   Result Value Ref Range    POCT Glucose 212 (H) 70 - 110 mg/dL   POCT glucose    Collection Time: 02/22/19 11:42 AM   Result Value Ref Range    POCT Glucose 166 (H) 70 - 110 mg/dL   Hemoglobin    Collection Time: 02/22/19  2:32 PM   Result Value Ref Range    Hemoglobin 8.2 (L) 12.0 - 16.0 g/dL   Hematocrit    Collection Time: 02/22/19  2:32 PM   Result Value Ref Range    Hematocrit 26.1 (L) 37.0 - 48.5 %   Hemoglobin    Collection Time: 02/22/19  2:32 PM   Result Value Ref Range    Hemoglobin 8.2 (L) 12.0 - 16.0 g/dL   Hematocrit    Collection Time: 02/22/19  2:32 PM   Result Value Ref Range    Hematocrit 26.1 (L) 37.0 - 48.5 %   APTT    Collection Time: 02/22/19  2:32 PM   Result Value Ref Range    aPTT 25.9 21.0 - 32.0 sec   Protime-INR    Collection Time: 02/22/19  2:32 PM   Result Value Ref Range    Prothrombin Time 9.8 9.0 - 12.5 sec    INR 0.9 0.8 - 1.2   CBC auto differential    Collection Time: 02/22/19  2:32 PM   Result Value Ref Range    WBC 9.90 3.90 - 12.70 K/uL    RBC 2.81 (L) 4.00 - 5.40 M/uL    Hemoglobin 8.2 (L) 12.0 - 16.0 g/dL    Hematocrit 26.1 (L) 37.0 - 48.5 %    MCV 93 82 - 98 fL    MCH 29.2 27.0 - 31.0 pg    MCHC 31.4 (L) 32.0 - 36.0 g/dL    RDW 15.8 (H) 11.5 - 14.5 %    Platelets 225 150 - 350 K/uL    MPV 10.7 9.2 - 12.9 fL     Immature Granulocytes 0.7 (H) 0.0 - 0.5 %    Gran # (ANC) 8.4 (H) 1.8 - 7.7 K/uL    Immature Grans (Abs) 0.07 (H) 0.00 - 0.04 K/uL    Lymph # 0.4 (L) 1.0 - 4.8 K/uL    Mono # 0.7 0.3 - 1.0 K/uL    Eos # 0.2 0.0 - 0.5 K/uL    Baso # 0.07 0.00 - 0.20 K/uL    nRBC 0 0 /100 WBC    Gran% 85.2 (H) 38.0 - 73.0 %    Lymph% 4.3 (L) 18.0 - 48.0 %    Mono% 7.2 4.0 - 15.0 %    Eosinophil% 1.9 0.0 - 8.0 %    Basophil% 0.7 0.0 - 1.9 %    Differential Method Automated    POCT glucose    Collection Time: 02/22/19  4:44 PM   Result Value Ref Range    POCT Glucose 156 (H) 70 - 110 mg/dL       Recent Labs   Lab 02/21/19  0807 02/21/19  1146 02/21/19  1703 02/22/19  0753 02/22/19  1142 02/22/19  1644   POCTGLUCOSE 125* 208* 114* 212* 166* 156*       Hemoglobin A1C   Date Value Ref Range Status   02/19/2019 5.6 4.0 - 5.6 % Final     Comment:     ADA Screening Guidelines:  5.7-6.4%  Consistent with prediabetes  >or=6.5%  Consistent with diabetes  High levels of fetal hemoglobin interfere with the HbA1C  assay. Heterozygous hemoglobin variants (HbS, HgC, etc)do  not significantly interfere with this assay.   However, presence of multiple variants may affect accuracy.          Active Hospital Problems    Diagnosis  POA    *Acute on chronic respiratory failure [J96.20]  Yes    HOCM (hypertrophic obstructive cardiomyopathy) [I42.1]  Yes    Acute on chronic diastolic congestive heart failure, NYHA class 3 [I50.33]  Yes      Resolved Hospital Problems   No resolved problems to display.         acetylcysteine 100 mg/ml (10%)  4 mL Nebulization TID WAKE    albuterol-ipratropium  3 mL Nebulization Q6H    fluticasone  1 spray Each Nare Daily    fluticasone-vilanterol  1 puff Inhalation Daily    gabapentin  100 mg Oral BID    levothyroxine  112 mcg Oral Before breakfast    metoprolol tartrate  50 mg Oral BID    montelukast  10 mg Oral QHS    tiotropium  1 capsule Inhalation Daily    verapamil  80 mg Oral TID     sodium chloride,  albuterol, albuterol-ipratropium, ALPRAZolam, dextrose 50%, dextrose 50%, glucagon (human recombinant), glucose, glucose, HYDROcodone-acetaminophen, insulin aspart U-100      Assessment and Plan for problems addressed today:   1. Acute on chronic respiratory failure  - Patient acute respiratory failure is multifactorial secondary to: moderate COPD, pulmonary HTN (PA 49), HOCM, PE, MARISOL (CPAP at 4), bilateral pulmonary effusion, hx of RUL cancer s/p chemo&XRT, recently tx for CAP zosyn x 12 days   - treated for PNA at OSH, no signs of infection. No further txt required   - per OSH records patient not tolerating CPAP at night, would get hypoxic and was switched over to BiPAP  - will continue BiPAP qhs  - pulmonary consulted; appreciate assistance - signed off   - wean O2 as able     2. 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   - given 1 unit PRBCs  - H/H q8  - hemoglobin 8.2 this a.m.  - discussed with GI, recommend starting back on blood thinners and if develops bleed they can intervene  - no blood in stool or melena noted      3. Hypothyroidism  - continue home dose synthroid      4. Pulmonary HTN  - pulmonary artery systolic pressure (PASP)= 49  - Mean PAP can be approximated because mPAP = 0.61sPAP + 2  - Mean PAP= 31.89 (32)     5. HOCM (hypertrophic obstructive cardiomyopathy)  - Hypertrophic cardiomyopathy with asymmetric septal hypertrophy  - Systolic anterior motion of the mitral valve with severe outflow tract left ventricular obstruction. The peak gradient is near 100mm Hg.  - continue BB and CCB  - uptitrate medications as tolerated   - cards following   - will need outpatient interventional cardiology follow-up for ablation      6. MARISOL on CPAP  - BiPAP qhs 12/8     7. Type 2 diabetes mellitus  - Low dose insulin sliding scale   - bedside glucose monitoring      8. Pleural effusion, bilateral  -  Thoracentesis 2/8 consistent w/ transudate (OSH studies)  - Thoracentesis 12/2018 cytology negative for malignancy  - Bilateral effusions notes on CXR      9. Chronic pulmonary embolism  - Hx of RML embolus diagnosed 12/2018  - h/h down trending 7.0 (8.5 on admission)  - discontinued lovenox   - discussed with pulmonology and recommend starting on heparin drip to see if she tolerates, if no bleeding will start on Xarelto or Pradaxa as patient did not tolerate Eliquis     10. Acute on chronic diastolic congestive heart failure, NYHA class 3    Echo 2/19/2019  · Hypertrophic cardiomyopathy with asymmetric septal hypertrophy  · Normal left ventricular systolic function. The estimated ejection fraction is 65%  · Normal right ventricular systolic function.  · Severe left atrial enlargement.  · Systolic anterior motion of the mitral valve with severe outflow tract left ventricular obstruction. The peak gradient is near 100mm Hg.  · The estimated PA systolic pressure is 49 mm Hg  · Intermediate central venous pressure (8 mm Hg).     - will hold off on diuresis; pt is preload dependent   - continue BB and CCB     11. COPD (chronic obstructive pulmonary disease)  - continue LAMA/ICS/LABA  - pulm following, appreciate recs   - duonebs q6 hrs        Diet:  Cardiac  DVT PPx:  Heparin drip  Code status:  Full    Discharge plan and follow up:  Start heparin drip and monitor for bleeding, discussed with GI and recommend starting heparin to see if she has any bleeding, if no bleeding over the next few days, will start NOAC, continue to wean oxygen as able, will give 1 dose of 20 mg IV Lasix for crackles noted on exam      Corina Dias MD  Hospital Medicine Staff  [phone] pager

## 2019-02-22 NOTE — PLAN OF CARE
Transferred to Hospital medicine [facility]. SW/CM will follow.

## 2019-02-22 NOTE — PLAN OF CARE
Problem: Physical Therapy Goal  Goal: Physical Therapy Goal  Goals to be met by: 3/6/2019     Patient will increase functional independence with mobility by performin. Supine to sit with Set-up [place]  2. Sit to supine with Set-up [place]  3. Sit to stand transfer with Contact Guard Assistance - Met       Revised: Sit to stand transfer with Supervision - Not Met  4. Bed to chair transfer with Contact Guard Assistance using Rolling Walker - Met      Revised: Bed to chair transfer with Supervision using Rolling Walker - Not Met  5. Gait  x 75 feet with Contact Guard Assistance using Rolling Walker. - Met      Revised: Gait  x 200 feet with Supervision using Rolling Walker. - Not Met     Outcome: Ongoing (interventions implemented as appropriate)  Goals #3-5 met and revised

## 2019-02-22 NOTE — SUBJECTIVE & OBJECTIVE
Interval History:   NAEON. Patient received 1UPBRC. H/h improved.  Complaining of SOB.       Objective:     Vital Signs (Most Recent):  Temp: 98.1 °F (36.7 °C) (02/22/19 1626)  Pulse: 78 (02/22/19 1626)  Resp: 19 (02/22/19 1626)  BP: 117/63 (02/22/19 1626)  SpO2: 99 % (02/22/19 1626) Vital Signs (24h Range):  Temp:  [96.9 °F (36.1 °C)-98.6 °F (37 °C)] 98.1 °F (36.7 °C)  Pulse:  [66-90] 78  Resp:  [16-27] 19  SpO2:  [94 %-99 %] 99 %  BP: (100-137)/(53-75) 117/63     Weight: 93 kg (205 lb 0.4 oz)  Body mass index is 34.12 kg/m².     SpO2: 99 %  O2 Device (Oxygen Therapy): nasal cannula w/ humidification      Intake/Output Summary (Last 24 hours) at 2/22/2019 1710  Last data filed at 2/22/2019 1153  Gross per 24 hour   Intake 394 ml   Output 2100 ml   Net -1706 ml       Lines/Drains/Airways     Drain            Female External Urinary Catheter 02/21/19 0800 1 day                Physical Exam   Constitutional: She is oriented to person, place, and time.   Patient not in respiratory distress. However, develops SOB w/ movement in bed.    HENT:   Head: Normocephalic and atraumatic.   Eyes: Conjunctivae are normal. Pupils are equal, round, and reactive to light.   Neck: Neck supple. No JVD present.   Cardiovascular: Normal rate.   Murmur heard.  Pulmonary/Chest: Effort normal. No respiratory distress. She has decreased breath sounds. She has no wheezes.   Abdominal: Soft. Bowel sounds are normal. There is no tenderness. There is no rebound.       Musculoskeletal: She exhibits no edema or deformity.   Neurological: She is alert and oriented to person, place, and time.   Skin: Skin is warm. No erythema.       Significant Labs: All pertinent lab results from the last 24 hours have been reviewed.    Significant Imaging: reviewed

## 2019-02-22 NOTE — PLAN OF CARE
Problem: Adult Inpatient Plan of Care  Goal: Plan of Care Review  Outcome: Ongoing (interventions implemented as appropriate)  Pt free of falls/injuries throughout the shift. Bed locked, in lowest position, call bell within reach. Pt afebrile, pain assessed & treated. Unit of PRBC tolerated well. VSS, pt in no distress, will continue to monitor.

## 2019-02-22 NOTE — NURSING
Released blood but realized that type and screen was not done. Called blood bank to hold blood. Will hang blood when type and screen resulted.

## 2019-02-23 LAB
ALBUMIN SERPL BCP-MCNC: 2.8 G/DL
ALP SERPL-CCNC: 70 U/L
ALT SERPL W/O P-5'-P-CCNC: 10 U/L
ANION GAP SERPL CALC-SCNC: 7 MMOL/L
APTT BLDCRRT: 28.9 SEC
APTT BLDCRRT: 30.4 SEC
APTT BLDCRRT: 30.4 SEC
APTT BLDCRRT: 52.6 SEC
AST SERPL-CCNC: 13 U/L
BASOPHILS # BLD AUTO: 0.04 K/UL
BASOPHILS # BLD AUTO: 0.04 K/UL
BASOPHILS NFR BLD: 0.5 %
BASOPHILS NFR BLD: 0.5 %
BILIRUB SERPL-MCNC: 0.6 MG/DL
BUN SERPL-MCNC: 24 MG/DL
CALCIUM SERPL-MCNC: 9.2 MG/DL
CHLORIDE SERPL-SCNC: 101 MMOL/L
CO2 SERPL-SCNC: 26 MMOL/L
CREAT SERPL-MCNC: 0.9 MG/DL
DIFFERENTIAL METHOD: ABNORMAL
DIFFERENTIAL METHOD: ABNORMAL
EOSINOPHIL # BLD AUTO: 0.1 K/UL
EOSINOPHIL # BLD AUTO: 0.1 K/UL
EOSINOPHIL NFR BLD: 0.8 %
EOSINOPHIL NFR BLD: 0.8 %
ERYTHROCYTE [DISTWIDTH] IN BLOOD BY AUTOMATED COUNT: 15.8 %
ERYTHROCYTE [DISTWIDTH] IN BLOOD BY AUTOMATED COUNT: 15.8 %
EST. GFR  (AFRICAN AMERICAN): >60 ML/MIN/1.73 M^2
EST. GFR  (NON AFRICAN AMERICAN): >60 ML/MIN/1.73 M^2
GLUCOSE SERPL-MCNC: 140 MG/DL
HCT VFR BLD AUTO: 25.7 %
HGB BLD-MCNC: 7.7 G/DL
HGB BLD-MCNC: 7.7 G/DL
HGB BLD-MCNC: 7.9 G/DL
HGB BLD-MCNC: 7.9 G/DL
HGB BLD-MCNC: 8.1 G/DL
IMM GRANULOCYTES # BLD AUTO: 0.05 K/UL
IMM GRANULOCYTES # BLD AUTO: 0.05 K/UL
IMM GRANULOCYTES NFR BLD AUTO: 0.6 %
IMM GRANULOCYTES NFR BLD AUTO: 0.6 %
LYMPHOCYTES # BLD AUTO: 0.3 K/UL
LYMPHOCYTES # BLD AUTO: 0.3 K/UL
LYMPHOCYTES NFR BLD: 3.5 %
LYMPHOCYTES NFR BLD: 3.5 %
MAGNESIUM SERPL-MCNC: 1.9 MG/DL
MCH RBC QN AUTO: 28.5 PG
MCH RBC QN AUTO: 28.5 PG
MCHC RBC AUTO-ENTMCNC: 30.7 G/DL
MCHC RBC AUTO-ENTMCNC: 30.7 G/DL
MCV RBC AUTO: 93 FL
MCV RBC AUTO: 93 FL
MONOCYTES # BLD AUTO: 0.5 K/UL
MONOCYTES # BLD AUTO: 0.5 K/UL
MONOCYTES NFR BLD: 6.9 %
MONOCYTES NFR BLD: 6.9 %
NEUTROPHILS # BLD AUTO: 6.9 K/UL
NEUTROPHILS # BLD AUTO: 6.9 K/UL
NEUTROPHILS NFR BLD: 87.7 %
NEUTROPHILS NFR BLD: 87.7 %
NRBC BLD-RTO: 0 /100 WBC
NRBC BLD-RTO: 0 /100 WBC
PLATELET # BLD AUTO: 231 K/UL
PLATELET # BLD AUTO: 231 K/UL
PMV BLD AUTO: 10.7 FL
PMV BLD AUTO: 10.7 FL
POCT GLUCOSE: 106 MG/DL (ref 70–110)
POCT GLUCOSE: 170 MG/DL (ref 70–110)
POCT GLUCOSE: 225 MG/DL (ref 70–110)
POCT GLUCOSE: 236 MG/DL (ref 70–110)
POTASSIUM SERPL-SCNC: 4.4 MMOL/L
PROT SERPL-MCNC: 6.4 G/DL
RBC # BLD AUTO: 2.77 M/UL
RBC # BLD AUTO: 2.77 M/UL
SODIUM SERPL-SCNC: 134 MMOL/L
WBC # BLD AUTO: 7.81 K/UL
WBC # BLD AUTO: 7.81 K/UL

## 2019-02-23 PROCEDURE — 80053 COMPREHEN METABOLIC PANEL: CPT

## 2019-02-23 PROCEDURE — 25000003 PHARM REV CODE 250: Performed by: STUDENT IN AN ORGANIZED HEALTH CARE EDUCATION/TRAINING PROGRAM

## 2019-02-23 PROCEDURE — 25000003 PHARM REV CODE 250: Performed by: INTERNAL MEDICINE

## 2019-02-23 PROCEDURE — 85014 HEMATOCRIT: CPT | Mod: 91

## 2019-02-23 PROCEDURE — 85025 COMPLETE CBC W/AUTO DIFF WBC: CPT

## 2019-02-23 PROCEDURE — 36415 COLL VENOUS BLD VENIPUNCTURE: CPT

## 2019-02-23 PROCEDURE — 25000242 PHARM REV CODE 250 ALT 637 W/ HCPCS: Performed by: STUDENT IN AN ORGANIZED HEALTH CARE EDUCATION/TRAINING PROGRAM

## 2019-02-23 PROCEDURE — 63600175 PHARM REV CODE 636 W HCPCS: Performed by: INTERNAL MEDICINE

## 2019-02-23 PROCEDURE — 94761 N-INVAS EAR/PLS OXIMETRY MLT: CPT

## 2019-02-23 PROCEDURE — 99232 SBSQ HOSP IP/OBS MODERATE 35: CPT | Mod: ,,, | Performed by: INTERNAL MEDICINE

## 2019-02-23 PROCEDURE — 99232 PR SUBSEQUENT HOSPITAL CARE,LEVL II: ICD-10-PCS | Mod: ,,, | Performed by: INTERNAL MEDICINE

## 2019-02-23 PROCEDURE — 83735 ASSAY OF MAGNESIUM: CPT

## 2019-02-23 PROCEDURE — 94640 AIRWAY INHALATION TREATMENT: CPT

## 2019-02-23 PROCEDURE — 11000001 HC ACUTE MED/SURG PRIVATE ROOM

## 2019-02-23 PROCEDURE — 85018 HEMOGLOBIN: CPT | Mod: 91

## 2019-02-23 PROCEDURE — 27000221 HC OXYGEN, UP TO 24 HOURS

## 2019-02-23 PROCEDURE — 85014 HEMATOCRIT: CPT

## 2019-02-23 PROCEDURE — 85730 THROMBOPLASTIN TIME PARTIAL: CPT | Mod: 91

## 2019-02-23 PROCEDURE — 99900035 HC TECH TIME PER 15 MIN (STAT)

## 2019-02-23 PROCEDURE — 25000003 PHARM REV CODE 250: Performed by: PHYSICIAN ASSISTANT

## 2019-02-23 PROCEDURE — 85018 HEMOGLOBIN: CPT

## 2019-02-23 PROCEDURE — 25000242 PHARM REV CODE 250 ALT 637 W/ HCPCS: Performed by: INTERNAL MEDICINE

## 2019-02-23 RX ORDER — ACETAMINOPHEN 325 MG/1
650 TABLET ORAL EVERY 6 HOURS PRN
Status: DISCONTINUED | OUTPATIENT
Start: 2019-02-23 | End: 2019-03-08 | Stop reason: HOSPADM

## 2019-02-23 RX ORDER — ALPRAZOLAM 0.5 MG/1
0.5 TABLET ORAL EVERY 8 HOURS PRN
Status: DISCONTINUED | OUTPATIENT
Start: 2019-02-23 | End: 2019-02-26

## 2019-02-23 RX ORDER — ALPRAZOLAM 0.5 MG/1
0.5 TABLET ORAL ONCE AS NEEDED
Status: COMPLETED | OUTPATIENT
Start: 2019-02-23 | End: 2019-02-23

## 2019-02-23 RX ADMIN — VERAPAMIL HYDROCHLORIDE 80 MG: 80 TABLET, FILM COATED ORAL at 04:02

## 2019-02-23 RX ADMIN — LEVOTHYROXINE SODIUM 112 MCG: 112 TABLET ORAL at 04:02

## 2019-02-23 RX ADMIN — ALBUTEROL SULFATE 2 PUFF: 90 AEROSOL, METERED RESPIRATORY (INHALATION) at 12:02

## 2019-02-23 RX ADMIN — FLUTICASONE PROPIONATE 50 MCG: 50 SPRAY, METERED NASAL at 10:02

## 2019-02-23 RX ADMIN — INSULIN ASPART 1 UNITS: 100 INJECTION, SOLUTION INTRAVENOUS; SUBCUTANEOUS at 08:02

## 2019-02-23 RX ADMIN — METOPROLOL TARTRATE 50 MG: 50 TABLET ORAL at 08:02

## 2019-02-23 RX ADMIN — MONTELUKAST SODIUM 10 MG: 10 TABLET, FILM COATED ORAL at 08:02

## 2019-02-23 RX ADMIN — TIOTROPIUM BROMIDE 18 MCG: 18 CAPSULE ORAL; RESPIRATORY (INHALATION) at 10:02

## 2019-02-23 RX ADMIN — INSULIN ASPART 2 UNITS: 100 INJECTION, SOLUTION INTRAVENOUS; SUBCUTANEOUS at 05:02

## 2019-02-23 RX ADMIN — ALPRAZOLAM 0.5 MG: 0.5 TABLET ORAL at 04:02

## 2019-02-23 RX ADMIN — GABAPENTIN 100 MG: 100 CAPSULE ORAL at 10:02

## 2019-02-23 RX ADMIN — HYDROCODONE BITARTRATE AND ACETAMINOPHEN 1 TABLET: 5; 325 TABLET ORAL at 10:02

## 2019-02-23 RX ADMIN — VERAPAMIL HYDROCHLORIDE 80 MG: 80 TABLET, FILM COATED ORAL at 10:02

## 2019-02-23 RX ADMIN — HYDROCODONE BITARTRATE AND ACETAMINOPHEN 1 TABLET: 5; 325 TABLET ORAL at 04:02

## 2019-02-23 RX ADMIN — IPRATROPIUM BROMIDE AND ALBUTEROL SULFATE 3 ML: .5; 3 SOLUTION RESPIRATORY (INHALATION) at 07:02

## 2019-02-23 RX ADMIN — ACETYLCYSTEINE 4 ML: 100 SOLUTION ORAL; RESPIRATORY (INHALATION) at 07:02

## 2019-02-23 RX ADMIN — IPRATROPIUM BROMIDE AND ALBUTEROL SULFATE 3 ML: .5; 3 SOLUTION RESPIRATORY (INHALATION) at 01:02

## 2019-02-23 RX ADMIN — HEPARIN SODIUM 16 UNITS/KG/HR: 10000 INJECTION, SOLUTION INTRAVENOUS at 06:02

## 2019-02-23 RX ADMIN — HEPARIN SODIUM 18 UNITS/KG/HR: 10000 INJECTION, SOLUTION INTRAVENOUS at 04:02

## 2019-02-23 RX ADMIN — METOPROLOL TARTRATE 50 MG: 50 TABLET ORAL at 10:02

## 2019-02-23 RX ADMIN — ACETYLCYSTEINE 4 ML: 100 SOLUTION ORAL; RESPIRATORY (INHALATION) at 01:02

## 2019-02-23 RX ADMIN — GABAPENTIN 100 MG: 100 CAPSULE ORAL at 08:02

## 2019-02-23 RX ADMIN — ACETAMINOPHEN 650 MG: 325 TABLET ORAL at 04:02

## 2019-02-23 RX ADMIN — VERAPAMIL HYDROCHLORIDE 80 MG: 80 TABLET, FILM COATED ORAL at 08:02

## 2019-02-23 RX ADMIN — ALPRAZOLAM 0.5 MG: 0.5 TABLET ORAL at 10:02

## 2019-02-23 NOTE — PLAN OF CARE
Problem: Adult Inpatient Plan of Care  Goal: Plan of Care Review  Outcome: Ongoing (interventions implemented as appropriate)  Pt free of fall this shift. Pain assessed and prn norco given; pt verbalized moderate relief of pain. Cbg monitored and prn insulin given as ordered to manage pt DM2. Cardiac monitored in place and no abnormal rhythm noted. Red streak BMs noted and MD aware; MD informed pt that MD still want pt to get heparin drip and Pt agreed; heparin drip administered. Pt aaox4. Afebrile. Blood pressure stable at end of shift. Will continue to monitor pt.

## 2019-02-23 NOTE — PLAN OF CARE
Problem: Adult Inpatient Plan of Care  Goal: Plan of Care Review  Outcome: Ongoing (interventions implemented as appropriate)  Pt VSS. Pt remains AAOx4 throughout shift. Pt remains free from falls or other injuries throughout shift. Bed locked in lowest position, call light within reach, side rails up x 3, will continue to monitor. Heparin drip at 16 units/kg/hr. Next appt scheduled for 12 pm.

## 2019-02-23 NOTE — PROGRESS NOTES
Hospital Medicine   Progress note   Team: [facility] MED O Corina Dias MD   Admit Date: 2/19/2019   JERRY    Code status: Full Code   Principal Problem: Acute on chronic respiratory failure     Interval hx:  Patient transferred from Cardiology team 02/21/2019.  Started on heparin drip yesterday.  Patient denies any bleeding overnight.  She denies any chest pain.  Hemoglobin 7.9 today.  Currently on 5 L via nasal cannula.  Patient complaining of anxiety this am.      ROS   Constitutional: Negative for chills, fever. +fatigue  HENT: Negative for sore throat, trouble swallowing.  +nosebleeds  Eyes: Negative for photophobia, visual disturbance.   Respiratory: +cough, +shortness of breath.    Cardiovascular: Negative for chest pain, palpitations, leg swelling.   Gastrointestinal: Negative for abdominal pain, constipation, diarrhea, nausea, vomiting.   Endocrine: Negative for cold intolerance, heat intolerance.   Genitourinary: Negative for dysuria, frequency.   Musculoskeletal: Negative for arthralgias, myalgias.   Skin: Negative for rash, erythema , +wounds on abdomen from lovenox injections  Neurological: Negative for dizziness, syncope, light-headedness. +weakness  Psychiatric/Behavioral: Negative for confusion, hallucinations, +anxiety  All other systems reviewed and are negative.    PEx   Temp:  [98.1 °F (36.7 °C)-98.6 °F (37 °C)]   Pulse:  [63-99]   Resp:  [18-28]   BP: (100-144)/(48-72)   SpO2:  [96 %-100 %]      I & O (Last 24H):     Intake/Output Summary (Last 24 hours) at 2/23/2019 0819  Last data filed at 2/23/2019 0601  Gross per 24 hour   Intake 480 ml   Output 1500 ml   Net -1020 ml       General appearance: no distress, alert and oriented x 3, chronically ill-appearing  HEENT:  conjunctivae/corneas clear, PERRL, mucous membranes moist, [place]  Neck: supple, thyroid not enlarged  Pulm:   normal respiratory effort, decreased breath sounds in bases, no crackles or wheezes, no rhonchi  Card: RRR, S1, S2 normal,  no murmur, click, rub or gallop  Abd: soft, NT, ND, BS present; no masses, no organomegaly  Ext: no c/c/e  Pulses: 2+, symmetric  Skin: color, texture, turgor normal.  Healing ecchymoses noted over abdomen, mildly tender to palpation  Neuro: CN II-XII grossly intact, no focal numbness or weakness, normal strength and tone   Psych: normal mood and affect      Recent Results (from the past 24 hour(s))   POCT glucose    Collection Time: 02/22/19 11:42 AM   Result Value Ref Range    POCT Glucose 166 (H) 70 - 110 mg/dL   Hemoglobin    Collection Time: 02/22/19  2:32 PM   Result Value Ref Range    Hemoglobin 8.2 (L) 12.0 - 16.0 g/dL   Hematocrit    Collection Time: 02/22/19  2:32 PM   Result Value Ref Range    Hematocrit 26.1 (L) 37.0 - 48.5 %   Hemoglobin    Collection Time: 02/22/19  2:32 PM   Result Value Ref Range    Hemoglobin 8.2 (L) 12.0 - 16.0 g/dL   Hematocrit    Collection Time: 02/22/19  2:32 PM   Result Value Ref Range    Hematocrit 26.1 (L) 37.0 - 48.5 %   APTT    Collection Time: 02/22/19  2:32 PM   Result Value Ref Range    aPTT 25.9 21.0 - 32.0 sec   Protime-INR    Collection Time: 02/22/19  2:32 PM   Result Value Ref Range    Prothrombin Time 9.8 9.0 - 12.5 sec    INR 0.9 0.8 - 1.2   CBC auto differential    Collection Time: 02/22/19  2:32 PM   Result Value Ref Range    WBC 9.90 3.90 - 12.70 K/uL    RBC 2.81 (L) 4.00 - 5.40 M/uL    Hemoglobin 8.2 (L) 12.0 - 16.0 g/dL    Hematocrit 26.1 (L) 37.0 - 48.5 %    MCV 93 82 - 98 fL    MCH 29.2 27.0 - 31.0 pg    MCHC 31.4 (L) 32.0 - 36.0 g/dL    RDW 15.8 (H) 11.5 - 14.5 %    Platelets 225 150 - 350 K/uL    MPV 10.7 9.2 - 12.9 fL    Immature Granulocytes 0.7 (H) 0.0 - 0.5 %    Gran # (ANC) 8.4 (H) 1.8 - 7.7 K/uL    Immature Grans (Abs) 0.07 (H) 0.00 - 0.04 K/uL    Lymph # 0.4 (L) 1.0 - 4.8 K/uL    Mono # 0.7 0.3 - 1.0 K/uL    Eos # 0.2 0.0 - 0.5 K/uL    Baso # 0.07 0.00 - 0.20 K/uL    nRBC 0 0 /100 WBC    Gran% 85.2 (H) 38.0 - 73.0 %    Lymph% 4.3 (L) 18.0 - 48.0 %     Mono% 7.2 4.0 - 15.0 %    Eosinophil% 1.9 0.0 - 8.0 %    Basophil% 0.7 0.0 - 1.9 %    Differential Method Automated    POCT glucose    Collection Time: 02/22/19  4:44 PM   Result Value Ref Range    POCT Glucose 156 (H) 70 - 110 mg/dL   POCT glucose    Collection Time: 02/22/19  8:52 PM   Result Value Ref Range    POCT Glucose 170 (H) 70 - 110 mg/dL   APTT    Collection Time: 02/22/19  8:56 PM   Result Value Ref Range    aPTT 28.3 21.0 - 32.0 sec   Hemoglobin    Collection Time: 02/23/19 12:00 AM   Result Value Ref Range    Hemoglobin 8.1 (L) 12.0 - 16.0 g/dL   Hematocrit    Collection Time: 02/23/19 12:00 AM   Result Value Ref Range    Hematocrit 25.7 (L) 37.0 - 48.5 %   Comprehensive metabolic panel    Collection Time: 02/23/19  4:34 AM   Result Value Ref Range    Sodium 134 (L) 136 - 145 mmol/L    Potassium 4.4 3.5 - 5.1 mmol/L    Chloride 101 95 - 110 mmol/L    CO2 26 23 - 29 mmol/L    Glucose 140 (H) 70 - 110 mg/dL    BUN, Bld 24 (H) 8 - 23 mg/dL    Creatinine 0.9 0.5 - 1.4 mg/dL    Calcium 9.2 8.7 - 10.5 mg/dL    Total Protein 6.4 6.0 - 8.4 g/dL    Albumin 2.8 (L) 3.5 - 5.2 g/dL    Total Bilirubin 0.6 0.1 - 1.0 mg/dL    Alkaline Phosphatase 70 55 - 135 U/L    AST 13 10 - 40 U/L    ALT 10 10 - 44 U/L    Anion Gap 7 (L) 8 - 16 mmol/L    eGFR if African American >60.0 >60 mL/min/1.73 m^2    eGFR if non African American >60.0 >60 mL/min/1.73 m^2   Magnesium    Collection Time: 02/23/19  4:34 AM   Result Value Ref Range    Magnesium 1.9 1.6 - 2.6 mg/dL   CBC auto differential    Collection Time: 02/23/19  4:34 AM   Result Value Ref Range    WBC 7.81 3.90 - 12.70 K/uL    RBC 2.77 (L) 4.00 - 5.40 M/uL    Hemoglobin 7.9 (L) 12.0 - 16.0 g/dL    Hematocrit 25.7 (L) 37.0 - 48.5 %    MCV 93 82 - 98 fL    MCH 28.5 27.0 - 31.0 pg    MCHC 30.7 (L) 32.0 - 36.0 g/dL    RDW 15.8 (H) 11.5 - 14.5 %    Platelets 231 150 - 350 K/uL    MPV 10.7 9.2 - 12.9 fL    Immature Granulocytes 0.6 (H) 0.0 - 0.5 %    Gran # (ANC) 6.9 1.8 -  7.7 K/uL    Immature Grans (Abs) 0.05 (H) 0.00 - 0.04 K/uL    Lymph # 0.3 (L) 1.0 - 4.8 K/uL    Mono # 0.5 0.3 - 1.0 K/uL    Eos # 0.1 0.0 - 0.5 K/uL    Baso # 0.04 0.00 - 0.20 K/uL    nRBC 0 0 /100 WBC    Gran% 87.7 (H) 38.0 - 73.0 %    Lymph% 3.5 (L) 18.0 - 48.0 %    Mono% 6.9 4.0 - 15.0 %    Eosinophil% 0.8 0.0 - 8.0 %    Basophil% 0.5 0.0 - 1.9 %    Differential Method Automated    APTT    Collection Time: 02/23/19  4:34 AM   Result Value Ref Range    aPTT 30.4 21.0 - 32.0 sec   CBC auto differential    Collection Time: 02/23/19  4:34 AM   Result Value Ref Range    WBC 7.81 3.90 - 12.70 K/uL    RBC 2.77 (L) 4.00 - 5.40 M/uL    Hemoglobin 7.9 (L) 12.0 - 16.0 g/dL    Hematocrit 25.7 (L) 37.0 - 48.5 %    MCV 93 82 - 98 fL    MCH 28.5 27.0 - 31.0 pg    MCHC 30.7 (L) 32.0 - 36.0 g/dL    RDW 15.8 (H) 11.5 - 14.5 %    Platelets 231 150 - 350 K/uL    MPV 10.7 9.2 - 12.9 fL    Immature Granulocytes 0.6 (H) 0.0 - 0.5 %    Gran # (ANC) 6.9 1.8 - 7.7 K/uL    Immature Grans (Abs) 0.05 (H) 0.00 - 0.04 K/uL    Lymph # 0.3 (L) 1.0 - 4.8 K/uL    Mono # 0.5 0.3 - 1.0 K/uL    Eos # 0.1 0.0 - 0.5 K/uL    Baso # 0.04 0.00 - 0.20 K/uL    nRBC 0 0 /100 WBC    Gran% 87.7 (H) 38.0 - 73.0 %    Lymph% 3.5 (L) 18.0 - 48.0 %    Mono% 6.9 4.0 - 15.0 %    Eosinophil% 0.8 0.0 - 8.0 %    Basophil% 0.5 0.0 - 1.9 %    Differential Method Automated    APTT    Collection Time: 02/23/19  4:34 AM   Result Value Ref Range    aPTT 30.4 21.0 - 32.0 sec       Recent Labs   Lab 02/21/19  1146 02/21/19  1703 02/22/19  0753 02/22/19  1142 02/22/19  1644 02/22/19 2052   POCTGLUCOSE 208* 114* 212* 166* 156* 170*       Hemoglobin A1C   Date Value Ref Range Status   02/19/2019 5.6 4.0 - 5.6 % Final     Comment:     ADA Screening Guidelines:  5.7-6.4%  Consistent with prediabetes  >or=6.5%  Consistent with diabetes  High levels of fetal hemoglobin interfere with the HbA1C  assay. Heterozygous hemoglobin variants (HbS, HgC, etc)do  not significantly interfere  with this assay.   However, presence of multiple variants may affect accuracy.          Active Hospital Problems    Diagnosis  POA    *Acute on chronic respiratory failure [J96.20]  Yes    HOCM (hypertrophic obstructive cardiomyopathy) [I42.1]  Yes    Acute on chronic diastolic congestive heart failure, NYHA class 3 [I50.33]  Yes      Resolved Hospital Problems   No resolved problems to display.         acetylcysteine 100 mg/ml (10%)  4 mL Nebulization TID WAKE    albuterol-ipratropium  3 mL Nebulization Q6H    fluticasone  1 spray Each Nare Daily    fluticasone-vilanterol  1 puff Inhalation Daily    gabapentin  100 mg Oral BID    levothyroxine  112 mcg Oral Before breakfast    metoprolol tartrate  50 mg Oral BID    montelukast  10 mg Oral QHS    tiotropium  1 capsule Inhalation Daily    verapamil  80 mg Oral TID     sodium chloride, albuterol, albuterol-ipratropium, ALPRAZolam, dextrose 50%, dextrose 50%, glucagon (human recombinant), glucose, glucose, HYDROcodone-acetaminophen, insulin aspart U-100      Assessment and Plan for problems addressed today:   1. Acute on chronic respiratory failure  - Patient acute respiratory failure is multifactorial secondary to: moderate COPD, pulmonary HTN (PA 49), HOCM, PE, MARISOL (CPAP at 4), bilateral pulmonary effusion, hx of RUL cancer s/p chemo&XRT, recently tx for CAP zosyn x 12 days   - treated for PNA at OSH, no signs of infection. No further txt required   - per OSH records patient not tolerating CPAP at night, would get hypoxic and was switched over to BiPAP  - will continue BiPAP qhs  - pulmonary consulted; appreciate assistance - signed off   - wean O2 as able     2. 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   - has been evaluated by hem/onc as outpatient and all workup was normal   - given 1 unit PRBCs  - H/H q8  - hemoglobin 8.2 this a.m.  - discussed  with GI, recommend starting back on blood thinners and if develops bleed they can intervene  - no blood in stool or melena noted  +    3. Hypothyroidism  - continue home dose synthroid      4. Pulmonary HTN  - pulmonary artery systolic pressure (PASP)= 49  - Mean PAP can be approximated because mPAP = 0.61sPAP + 2  - Mean PAP= 31.89 (32)     5. HOCM (hypertrophic obstructive cardiomyopathy)  - Hypertrophic cardiomyopathy with asymmetric septal hypertrophy  - Systolic anterior motion of the mitral valve with severe outflow tract left ventricular obstruction. The peak gradient is near 100mm Hg.  - continue BB and CCB  - uptitrate medications as tolerated   - cards following   - will need outpatient interventional cardiology follow-up for ablation      6. MARISOL on CPAP  - BiPAP qhs 12/8     7. Type 2 diabetes mellitus  - Low dose insulin sliding scale   - bedside glucose monitoring      8. Pleural effusion, bilateral  - Thoracentesis 2/8 consistent w/ transudate (OSH studies)  - Thoracentesis 12/2018 cytology negative for malignancy  - Bilateral effusions notes on CXR      9. Chronic pulmonary embolism  - Hx of RML embolus diagnosed 12/2018  - h/h down trending 7.0 (8.5 on admission)  - discontinued lovenox   - discussed with pulmonology and recommend starting on heparin drip to see if she tolerates, if no bleeding will start on Xarelto or Pradaxa as patient did not tolerate Eliquis     10. Acute on chronic diastolic congestive heart failure, NYHA class 3    Echo 2/19/2019  · Hypertrophic cardiomyopathy with asymmetric septal hypertrophy  · Normal left ventricular systolic function. The estimated ejection fraction is 65%  · Normal right ventricular systolic function.  · Severe left atrial enlargement.  · Systolic anterior motion of the mitral valve with severe outflow tract left ventricular obstruction. The peak gradient is near 100mm Hg.  · The estimated PA systolic pressure is 49 mm Hg  · Intermediate central venous  pressure (8 mm Hg).     - will hold off on diuresis; pt is preload dependent   - continue BB and CCB     11. COPD (chronic obstructive pulmonary disease)  - continue LAMA/ICS/LABA  - pulm following, appreciate recs   - duonebs q6 hrs     12. Anxiety  - on home xanax, will resume       Diet:  Cardiac  DVT PPx:  Heparin drip  Code status:  Full    Discharge plan and follow up:  Continue heparin drip and monitor for bleeding, if no bleeding over the next few days, will start NOAC, continue to wean oxygen as able, xanax as needed for anxiety       Corina Dias MD  [facility] Medicine Staff  [phone] pager

## 2019-02-24 LAB
ALBUMIN SERPL BCP-MCNC: 2.5 G/DL
ALP SERPL-CCNC: 70 U/L
ALT SERPL W/O P-5'-P-CCNC: 15 U/L
ANION GAP SERPL CALC-SCNC: 6 MMOL/L
APTT BLDCRRT: 49.4 SEC
AST SERPL-CCNC: 18 U/L
BASOPHILS # BLD AUTO: 0.03 K/UL
BASOPHILS NFR BLD: 0.6 %
BILIRUB SERPL-MCNC: 0.3 MG/DL
BUN SERPL-MCNC: 29 MG/DL
CALCIUM SERPL-MCNC: 9.1 MG/DL
CHLORIDE SERPL-SCNC: 103 MMOL/L
CO2 SERPL-SCNC: 27 MMOL/L
CREAT SERPL-MCNC: 0.9 MG/DL
DIFFERENTIAL METHOD: ABNORMAL
EOSINOPHIL # BLD AUTO: 0.1 K/UL
EOSINOPHIL NFR BLD: 1.4 %
ERYTHROCYTE [DISTWIDTH] IN BLOOD BY AUTOMATED COUNT: 16 %
EST. GFR  (AFRICAN AMERICAN): >60 ML/MIN/1.73 M^2
EST. GFR  (NON AFRICAN AMERICAN): >60 ML/MIN/1.73 M^2
GLUCOSE SERPL-MCNC: 136 MG/DL
HCT VFR BLD AUTO: 24 %
HCT VFR BLD AUTO: 24 %
HGB BLD-MCNC: 7.3 G/DL
HGB BLD-MCNC: 7.3 G/DL
IMM GRANULOCYTES # BLD AUTO: 0.06 K/UL
IMM GRANULOCYTES NFR BLD AUTO: 1.2 %
LYMPHOCYTES # BLD AUTO: 0.3 K/UL
LYMPHOCYTES NFR BLD: 5.6 %
MAGNESIUM SERPL-MCNC: 1.9 MG/DL
MCH RBC QN AUTO: 28.4 PG
MCHC RBC AUTO-ENTMCNC: 30.4 G/DL
MCV RBC AUTO: 93 FL
MONOCYTES # BLD AUTO: 0.6 K/UL
MONOCYTES NFR BLD: 11.8 %
NEUTROPHILS # BLD AUTO: 4 K/UL
NEUTROPHILS NFR BLD: 79.4 %
NRBC BLD-RTO: 0 /100 WBC
PLATELET # BLD AUTO: 211 K/UL
PMV BLD AUTO: 10.8 FL
POCT GLUCOSE: 141 MG/DL (ref 70–110)
POCT GLUCOSE: 150 MG/DL (ref 70–110)
POCT GLUCOSE: 180 MG/DL (ref 70–110)
POCT GLUCOSE: 194 MG/DL (ref 70–110)
POTASSIUM SERPL-SCNC: 4.6 MMOL/L
PROT SERPL-MCNC: 6.1 G/DL
RBC # BLD AUTO: 2.57 M/UL
SODIUM SERPL-SCNC: 136 MMOL/L
WBC # BLD AUTO: 4.99 K/UL

## 2019-02-24 PROCEDURE — 25000242 PHARM REV CODE 250 ALT 637 W/ HCPCS: Performed by: STUDENT IN AN ORGANIZED HEALTH CARE EDUCATION/TRAINING PROGRAM

## 2019-02-24 PROCEDURE — 83735 ASSAY OF MAGNESIUM: CPT

## 2019-02-24 PROCEDURE — 94660 CPAP INITIATION&MGMT: CPT

## 2019-02-24 PROCEDURE — 11000001 HC ACUTE MED/SURG PRIVATE ROOM

## 2019-02-24 PROCEDURE — 80053 COMPREHEN METABOLIC PANEL: CPT

## 2019-02-24 PROCEDURE — 99233 SBSQ HOSP IP/OBS HIGH 50: CPT | Mod: ,,, | Performed by: INTERNAL MEDICINE

## 2019-02-24 PROCEDURE — 85730 THROMBOPLASTIN TIME PARTIAL: CPT

## 2019-02-24 PROCEDURE — 85025 COMPLETE CBC W/AUTO DIFF WBC: CPT

## 2019-02-24 PROCEDURE — 27000221 HC OXYGEN, UP TO 24 HOURS

## 2019-02-24 PROCEDURE — 99900035 HC TECH TIME PER 15 MIN (STAT)

## 2019-02-24 PROCEDURE — 94640 AIRWAY INHALATION TREATMENT: CPT

## 2019-02-24 PROCEDURE — 94761 N-INVAS EAR/PLS OXIMETRY MLT: CPT

## 2019-02-24 PROCEDURE — 25000003 PHARM REV CODE 250: Performed by: STUDENT IN AN ORGANIZED HEALTH CARE EDUCATION/TRAINING PROGRAM

## 2019-02-24 PROCEDURE — 25000003 PHARM REV CODE 250: Performed by: INTERNAL MEDICINE

## 2019-02-24 PROCEDURE — 99233 PR SUBSEQUENT HOSPITAL CARE,LEVL III: ICD-10-PCS | Mod: ,,, | Performed by: INTERNAL MEDICINE

## 2019-02-24 RX ADMIN — VERAPAMIL HYDROCHLORIDE 80 MG: 80 TABLET, FILM COATED ORAL at 02:02

## 2019-02-24 RX ADMIN — FLUTICASONE PROPIONATE 50 MCG: 50 SPRAY, METERED NASAL at 08:02

## 2019-02-24 RX ADMIN — MONTELUKAST SODIUM 10 MG: 10 TABLET, FILM COATED ORAL at 08:02

## 2019-02-24 RX ADMIN — METOPROLOL TARTRATE 50 MG: 50 TABLET ORAL at 08:02

## 2019-02-24 RX ADMIN — ACETYLCYSTEINE 4 ML: 100 SOLUTION ORAL; RESPIRATORY (INHALATION) at 08:02

## 2019-02-24 RX ADMIN — ALPRAZOLAM 0.5 MG: 0.5 TABLET ORAL at 10:02

## 2019-02-24 RX ADMIN — GABAPENTIN 100 MG: 100 CAPSULE ORAL at 08:02

## 2019-02-24 RX ADMIN — HYDROCODONE BITARTRATE AND ACETAMINOPHEN 1 TABLET: 5; 325 TABLET ORAL at 08:02

## 2019-02-24 RX ADMIN — IPRATROPIUM BROMIDE AND ALBUTEROL SULFATE 3 ML: .5; 3 SOLUTION RESPIRATORY (INHALATION) at 07:02

## 2019-02-24 RX ADMIN — ACETYLCYSTEINE 4 ML: 100 SOLUTION ORAL; RESPIRATORY (INHALATION) at 01:02

## 2019-02-24 RX ADMIN — IPRATROPIUM BROMIDE AND ALBUTEROL SULFATE 3 ML: .5; 3 SOLUTION RESPIRATORY (INHALATION) at 01:02

## 2019-02-24 RX ADMIN — FLUTICASONE FUROATE AND VILANTEROL TRIFENATATE 1 PUFF: 200; 25 POWDER RESPIRATORY (INHALATION) at 08:02

## 2019-02-24 RX ADMIN — HYDROCODONE BITARTRATE AND ACETAMINOPHEN 1 TABLET: 5; 325 TABLET ORAL at 07:02

## 2019-02-24 RX ADMIN — IPRATROPIUM BROMIDE AND ALBUTEROL SULFATE 3 ML: .5; 3 SOLUTION RESPIRATORY (INHALATION) at 08:02

## 2019-02-24 RX ADMIN — ALBUTEROL SULFATE 2 PUFF: 90 AEROSOL, METERED RESPIRATORY (INHALATION) at 08:02

## 2019-02-24 RX ADMIN — VERAPAMIL HYDROCHLORIDE 80 MG: 80 TABLET, FILM COATED ORAL at 08:02

## 2019-02-24 RX ADMIN — ACETYLCYSTEINE 4 ML: 100 SOLUTION ORAL; RESPIRATORY (INHALATION) at 07:02

## 2019-02-24 RX ADMIN — LEVOTHYROXINE SODIUM 112 MCG: 112 TABLET ORAL at 05:02

## 2019-02-24 RX ADMIN — IPRATROPIUM BROMIDE AND ALBUTEROL SULFATE 3 ML: .5; 3 SOLUTION RESPIRATORY (INHALATION) at 12:02

## 2019-02-24 NOTE — PLAN OF CARE
Problem: Adult Inpatient Plan of Care  Goal: Plan of Care Review  Outcome: Ongoing (interventions implemented as appropriate)  Pt free of falls/injuries throughout the shift. Bed locked, in lowest position, call bell within reach. Pt afebrile, pain assessed & treated. VSS, pt in no distress, son at bs, will continue to monitor.

## 2019-02-24 NOTE — PLAN OF CARE
Problem: Adult Inpatient Plan of Care  Goal: Plan of Care Review  Outcome: Ongoing (interventions implemented as appropriate)  Pt free of fall this shift. Pain assessed and prn norco given; pt also c/o headache and prn tylenol given; pt verbalized moderate relief of pain. Cbg monitored and prn insulin given as ordered to manage pt DM2. Cardiac monitored in place and normal sinus rhythm noted and was pavithra after receiving beta blocker. Red streak BMs noted and MD aware; MD informed pt that MD still want pt to get heparin drip and Pt agreed; heparin drip administered. Pt aaox4. Afebrile. Blood pressure stable at end of shift. Will continue to monitor pt.

## 2019-02-24 NOTE — PROGRESS NOTES
Hospital Medicine   Progress note   Team: [facility] HOSP MED O Corina Dias MD   Admit Date: 2/19/2019   JERRY    Code status: Full Code   Principal Problem: Acute on chronic respiratory failure     Interval hx:  Patient notes episode of black stool overnight.  Hemoglobin dropped to 7.3 this a.m..  Will hold heparin drip and discuss with GI.  Patient denies any dizziness or lightheadedness.  O2 down to 3 L nasal cannula.    ROS   Constitutional: Negative for chills, fever. +fatigue  HENT: Negative for sore throat, trouble swallowing.  +nosebleeds  Eyes: Negative for photophobia, visual disturbance.   Respiratory: +cough, +shortness of breath.    Cardiovascular: Negative for chest pain, palpitations, leg swelling.   Gastrointestinal: Negative for abdominal pain, constipation, diarrhea, nausea, vomiting. +melena  Endocrine: Negative for cold intolerance, heat intolerance.   Genitourinary: Negative for dysuria, frequency.   Musculoskeletal: Negative for arthralgias, myalgias.   Skin: Negative for rash, erythema , +wounds on abdomen from lovenox injections  Neurological: Negative for dizziness, syncope, light-headedness. +weakness  Psychiatric/Behavioral: Negative for confusion, hallucinations, +anxiety  All other systems reviewed and are negative.    PEx   Temp:  [97.9 °F (36.6 °C)-99.1 °F (37.3 °C)]   Pulse:  [51-93]   Resp:  [18-26]   BP: (105-154)/(48-70)   SpO2:  [95 %-100 %]      I & O (Last 24H):     Intake/Output Summary (Last 24 hours) at 2/24/2019 0830  Last data filed at 2/24/2019 0612  Gross per 24 hour   Intake 1060 ml   Output 850 ml   Net 210 ml       General appearance: no distress, alert and oriented x 3, chronically ill-appearing  HEENT:  conjunctivae/corneas clear, PERRL, mucous membranes moist, [place]  Neck: supple, thyroid not enlarged  Pulm:   normal respiratory effort, decreased breath sounds in bases, no crackles or wheezes, no rhonchi  Card: RRR, S1, S2 normal, no murmur, click, rub or gallop  Abd:  soft, NT, ND, BS present; no masses, no organomegaly  Ext: no c/c/e  Pulses: 2+, symmetric  Skin: color, texture, turgor normal.  Healing ecchymoses noted over abdomen, mildly tender to palpation  Neuro: CN II-XII grossly intact, no focal numbness or weakness, normal strength and tone   Psych: normal mood and affect      Recent Results (from the past 24 hour(s))   POCT glucose    Collection Time: 02/23/19 10:40 AM   Result Value Ref Range    POCT Glucose 106 70 - 110 mg/dL   Hemoglobin    Collection Time: 02/23/19 12:06 PM   Result Value Ref Range    Hemoglobin 7.7 (L) 12.0 - 16.0 g/dL   Hematocrit    Collection Time: 02/23/19 12:06 PM   Result Value Ref Range    Hematocrit 25.7 (L) 37.0 - 48.5 %   Hemoglobin    Collection Time: 02/23/19 12:06 PM   Result Value Ref Range    Hemoglobin 7.7 (L) 12.0 - 16.0 g/dL   Hematocrit    Collection Time: 02/23/19 12:06 PM   Result Value Ref Range    Hematocrit 25.7 (L) 37.0 - 48.5 %   APTT    Collection Time: 02/23/19 12:06 PM   Result Value Ref Range    aPTT 28.9 21.0 - 32.0 sec   POCT glucose    Collection Time: 02/23/19  4:23 PM   Result Value Ref Range    POCT Glucose 225 (H) 70 - 110 mg/dL   POCT glucose    Collection Time: 02/23/19  8:38 PM   Result Value Ref Range    POCT Glucose 236 (H) 70 - 110 mg/dL   APTT    Collection Time: 02/23/19  9:33 PM   Result Value Ref Range    aPTT 52.6 (H) 21.0 - 32.0 sec   Hemoglobin    Collection Time: 02/24/19  3:55 AM   Result Value Ref Range    Hemoglobin 7.3 (L) 12.0 - 16.0 g/dL   Hematocrit    Collection Time: 02/24/19  3:55 AM   Result Value Ref Range    Hematocrit 24.0 (L) 37.0 - 48.5 %   CBC auto differential    Collection Time: 02/24/19  3:55 AM   Result Value Ref Range    WBC 4.99 3.90 - 12.70 K/uL    RBC 2.57 (L) 4.00 - 5.40 M/uL    Hemoglobin 7.3 (L) 12.0 - 16.0 g/dL    Hematocrit 24.0 (L) 37.0 - 48.5 %    MCV 93 82 - 98 fL    MCH 28.4 27.0 - 31.0 pg    MCHC 30.4 (L) 32.0 - 36.0 g/dL    RDW 16.0 (H) 11.5 - 14.5 %    Platelets  211 150 - 350 K/uL    MPV 10.8 9.2 - 12.9 fL    Immature Granulocytes 1.2 (H) 0.0 - 0.5 %    Gran # (ANC) 4.0 1.8 - 7.7 K/uL    Immature Grans (Abs) 0.06 (H) 0.00 - 0.04 K/uL    Lymph # 0.3 (L) 1.0 - 4.8 K/uL    Mono # 0.6 0.3 - 1.0 K/uL    Eos # 0.1 0.0 - 0.5 K/uL    Baso # 0.03 0.00 - 0.20 K/uL    nRBC 0 0 /100 WBC    Gran% 79.4 (H) 38.0 - 73.0 %    Lymph% 5.6 (L) 18.0 - 48.0 %    Mono% 11.8 4.0 - 15.0 %    Eosinophil% 1.4 0.0 - 8.0 %    Basophil% 0.6 0.0 - 1.9 %    Differential Method Automated    APTT    Collection Time: 02/24/19  3:55 AM   Result Value Ref Range    aPTT 49.4 (H) 21.0 - 32.0 sec   Comprehensive metabolic panel    Collection Time: 02/24/19  3:55 AM   Result Value Ref Range    Sodium 136 136 - 145 mmol/L    Potassium 4.6 3.5 - 5.1 mmol/L    Chloride 103 95 - 110 mmol/L    CO2 27 23 - 29 mmol/L    Glucose 136 (H) 70 - 110 mg/dL    BUN, Bld 29 (H) 8 - 23 mg/dL    Creatinine 0.9 0.5 - 1.4 mg/dL    Calcium 9.1 8.7 - 10.5 mg/dL    Total Protein 6.1 6.0 - 8.4 g/dL    Albumin 2.5 (L) 3.5 - 5.2 g/dL    Total Bilirubin 0.3 0.1 - 1.0 mg/dL    Alkaline Phosphatase 70 55 - 135 U/L    AST 18 10 - 40 U/L    ALT 15 10 - 44 U/L    Anion Gap 6 (L) 8 - 16 mmol/L    eGFR if African American >60.0 >60 mL/min/1.73 m^2    eGFR if non African American >60.0 >60 mL/min/1.73 m^2   Magnesium    Collection Time: 02/24/19  3:55 AM   Result Value Ref Range    Magnesium 1.9 1.6 - 2.6 mg/dL       Recent Labs   Lab 02/22/19  1142 02/22/19  1644 02/22/19 2052 02/23/19  1040 02/23/19  1623 02/23/19 2038   POCTGLUCOSE 166* 156* 170* 106 225* 236*       Hemoglobin A1C   Date Value Ref Range Status   02/19/2019 5.6 4.0 - 5.6 % Final     Comment:     ADA Screening Guidelines:  5.7-6.4%  Consistent with prediabetes  >or=6.5%  Consistent with diabetes  High levels of fetal hemoglobin interfere with the HbA1C  assay. Heterozygous hemoglobin variants (HbS, HgC, etc)do  not significantly interfere with this assay.   However, presence of  multiple variants may affect accuracy.          Active Hospital Problems    Diagnosis  POA    *Acute on chronic respiratory failure [J96.20]  Yes    HOCM (hypertrophic obstructive cardiomyopathy) [I42.1]  Yes    Acute on chronic diastolic congestive heart failure, NYHA class 3 [I50.33]  Yes      Resolved Hospital Problems   No resolved problems to display.         acetylcysteine 100 mg/ml (10%)  4 mL Nebulization TID WAKE    albuterol-ipratropium  3 mL Nebulization Q6H    fluticasone  1 spray Each Nare Daily    fluticasone-vilanterol  1 puff Inhalation Daily    gabapentin  100 mg Oral BID    levothyroxine  112 mcg Oral Before breakfast    metoprolol tartrate  50 mg Oral BID    montelukast  10 mg Oral QHS    tiotropium  1 capsule Inhalation Daily    verapamil  80 mg Oral TID     sodium chloride, acetaminophen, albuterol, albuterol-ipratropium, ALPRAZolam, dextrose 50%, dextrose 50%, glucagon (human recombinant), glucose, glucose, HYDROcodone-acetaminophen, insulin aspart U-100      Assessment and Plan for problems addressed today:   1. Acute on chronic respiratory failure  - Patient acute respiratory failure is multifactorial secondary to: moderate COPD, pulmonary HTN (PA 49), HOCM, PE, MARISOL (CPAP at 4), bilateral pulmonary effusion, hx of RUL cancer s/p chemo&XRT, recently tx for CAP zosyn x 12 days   - treated for PNA at OSH, no signs of infection. No further txt required   - per OSH records patient not tolerating CPAP at night, would get hypoxic and was switched over to BiPAP  - will continue BiPAP qhs  - pulmonary consulted; appreciate assistance - signed off   - wean O2 as able     2. 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   - has been evaluated by hem/onc as outpatient and all workup was normal   - s/p 1 unit PRBCs  - H/H q8  - hemoglobin 7.3 this a.m.  - discussed with GI today, will make NPO  at midnight in case of EGD  - check fecal occult  - melena overnight     3. Hypothyroidism  - continue home dose synthroid      4. Pulmonary HTN  - pulmonary artery systolic pressure (PASP)= 49  - Mean PAP can be approximated because mPAP = 0.61sPAP + 2  - Mean PAP= 31.89 (32)     5. HOCM (hypertrophic obstructive cardiomyopathy)  - Hypertrophic cardiomyopathy with asymmetric septal hypertrophy  - Systolic anterior motion of the mitral valve with severe outflow tract left ventricular obstruction. The peak gradient is near 100mm Hg.  - continue BB and CCB  - uptitrate medications as tolerated   - cards following   - will need outpatient interventional cardiology follow-up for ablation      6. MARISOL on CPAP  - BiPAP qhs 12/8     7. Type 2 diabetes mellitus  - Low dose insulin sliding scale   - bedside glucose monitoring      8. Pleural effusion, bilateral  - Thoracentesis 2/8 consistent w/ transudate (OSH studies)  - Thoracentesis 12/2018 cytology negative for malignancy  - Bilateral effusions notes on CXR      9. Chronic pulmonary embolism  - Hx of RML embolus diagnosed 12/2018  - h/h down trending 7.0 (8.5 on admission)  - discontinued lovenox   - discussed with pulmonology and recommend starting on heparin drip to see if she tolerates, if no bleeding will start on Xarelto or Pradaxa as patient did not tolerate Eliquis     10. Acute on chronic diastolic congestive heart failure, NYHA class 3    Echo 2/19/2019  · Hypertrophic cardiomyopathy with asymmetric septal hypertrophy  · Normal left ventricular systolic function. The estimated ejection fraction is 65%  · Normal right ventricular systolic function.  · Severe left atrial enlargement.  · Systolic anterior motion of the mitral valve with severe outflow tract left ventricular obstruction. The peak gradient is near 100mm Hg.  · The estimated PA systolic pressure is 49 mm Hg  · Intermediate central venous pressure (8 mm Hg).     - will hold off on diuresis; pt is  preload dependent   - continue BB and CCB     11. COPD (chronic obstructive pulmonary disease)  - continue LAMA/ICS/LABA  - pulm following, appreciate recs   - duonebs q6 hrs     12. Anxiety  - on home xanax, will resume       Diet:  Cardiac, NPO at midnight  DVT PPx:  Heparin drip  Code status:  Full    Discharge plan and follow up:  Stop heparin as patient had melena overnight, check fecal occult, discussed with GI, will monitor hemoglobin and possibly do EGD tomorrow, will make NPO at midnight      Corina Dias MD  Hospital Medicine Staff  [phone] pager

## 2019-02-25 ENCOUNTER — ANESTHESIA EVENT (OUTPATIENT)
Dept: ENDOSCOPY | Facility: HOSPITAL | Age: 79
DRG: 291 | End: 2019-02-25
Payer: MEDICARE

## 2019-02-25 ENCOUNTER — ANESTHESIA (OUTPATIENT)
Dept: ENDOSCOPY | Facility: HOSPITAL | Age: 79
DRG: 291 | End: 2019-02-25
Payer: MEDICARE

## 2019-02-25 LAB
ALBUMIN SERPL BCP-MCNC: 2.5 G/DL
ALP SERPL-CCNC: 75 U/L
ALT SERPL W/O P-5'-P-CCNC: 16 U/L
ANION GAP SERPL CALC-SCNC: 9 MMOL/L
AST SERPL-CCNC: 16 U/L
BASOPHILS # BLD AUTO: 0.04 K/UL
BASOPHILS NFR BLD: 1 %
BILIRUB SERPL-MCNC: 0.3 MG/DL
BUN SERPL-MCNC: 31 MG/DL
CALCIUM SERPL-MCNC: 9.1 MG/DL
CHLORIDE SERPL-SCNC: 104 MMOL/L
CO2 SERPL-SCNC: 24 MMOL/L
CREAT SERPL-MCNC: 1 MG/DL
DIFFERENTIAL METHOD: ABNORMAL
EOSINOPHIL # BLD AUTO: 0.2 K/UL
EOSINOPHIL NFR BLD: 5.4 %
ERYTHROCYTE [DISTWIDTH] IN BLOOD BY AUTOMATED COUNT: 16.1 %
EST. GFR  (AFRICAN AMERICAN): >60 ML/MIN/1.73 M^2
EST. GFR  (NON AFRICAN AMERICAN): 53.7 ML/MIN/1.73 M^2
GLUCOSE SERPL-MCNC: 109 MG/DL
HCT VFR BLD AUTO: 24.1 %
HGB BLD-MCNC: 7.4 G/DL
IMM GRANULOCYTES # BLD AUTO: 0.03 K/UL
IMM GRANULOCYTES NFR BLD AUTO: 0.7 %
LYMPHOCYTES # BLD AUTO: 0.6 K/UL
LYMPHOCYTES NFR BLD: 14.3 %
MAGNESIUM SERPL-MCNC: 2 MG/DL
MCH RBC QN AUTO: 28.4 PG
MCHC RBC AUTO-ENTMCNC: 30.7 G/DL
MCV RBC AUTO: 92 FL
MONOCYTES # BLD AUTO: 0.6 K/UL
MONOCYTES NFR BLD: 15.5 %
NEUTROPHILS # BLD AUTO: 2.6 K/UL
NEUTROPHILS NFR BLD: 63.1 %
NRBC BLD-RTO: 0 /100 WBC
PLATELET # BLD AUTO: 223 K/UL
PMV BLD AUTO: 10.3 FL
POCT GLUCOSE: 121 MG/DL (ref 70–110)
POCT GLUCOSE: 134 MG/DL (ref 70–110)
POCT GLUCOSE: 159 MG/DL (ref 70–110)
POTASSIUM SERPL-SCNC: 4.4 MMOL/L
PROT SERPL-MCNC: 6.1 G/DL
RBC # BLD AUTO: 2.61 M/UL
SODIUM SERPL-SCNC: 137 MMOL/L
WBC # BLD AUTO: 4.07 K/UL

## 2019-02-25 PROCEDURE — 25000003 PHARM REV CODE 250: Performed by: NURSE ANESTHETIST, CERTIFIED REGISTERED

## 2019-02-25 PROCEDURE — 94761 N-INVAS EAR/PLS OXIMETRY MLT: CPT

## 2019-02-25 PROCEDURE — 94799 UNLISTED PULMONARY SVC/PX: CPT

## 2019-02-25 PROCEDURE — 80053 COMPREHEN METABOLIC PANEL: CPT

## 2019-02-25 PROCEDURE — D9220A PRA ANESTHESIA: Mod: CRNA,,, | Performed by: NURSE ANESTHETIST, CERTIFIED REGISTERED

## 2019-02-25 PROCEDURE — D9220A PRA ANESTHESIA: Mod: ANES,,, | Performed by: ANESTHESIOLOGY

## 2019-02-25 PROCEDURE — 25000003 PHARM REV CODE 250: Performed by: STUDENT IN AN ORGANIZED HEALTH CARE EDUCATION/TRAINING PROGRAM

## 2019-02-25 PROCEDURE — 85025 COMPLETE CBC W/AUTO DIFF WBC: CPT

## 2019-02-25 PROCEDURE — 11000001 HC ACUTE MED/SURG PRIVATE ROOM

## 2019-02-25 PROCEDURE — 82962 GLUCOSE BLOOD TEST: CPT | Performed by: INTERNAL MEDICINE

## 2019-02-25 PROCEDURE — 27000221 HC OXYGEN, UP TO 24 HOURS

## 2019-02-25 PROCEDURE — 37000008 HC ANESTHESIA 1ST 15 MINUTES: Performed by: INTERNAL MEDICINE

## 2019-02-25 PROCEDURE — 25000242 PHARM REV CODE 250 ALT 637 W/ HCPCS: Performed by: STUDENT IN AN ORGANIZED HEALTH CARE EDUCATION/TRAINING PROGRAM

## 2019-02-25 PROCEDURE — D9220A PRA ANESTHESIA: ICD-10-PCS | Mod: CRNA,,, | Performed by: NURSE ANESTHETIST, CERTIFIED REGISTERED

## 2019-02-25 PROCEDURE — 25000242 PHARM REV CODE 250 ALT 637 W/ HCPCS: Performed by: INTERNAL MEDICINE

## 2019-02-25 PROCEDURE — 99232 PR SUBSEQUENT HOSPITAL CARE,LEVL II: ICD-10-PCS | Mod: ,,, | Performed by: INTERNAL MEDICINE

## 2019-02-25 PROCEDURE — 83735 ASSAY OF MAGNESIUM: CPT

## 2019-02-25 PROCEDURE — 43235 EGD DIAGNOSTIC BRUSH WASH: CPT | Mod: ,,, | Performed by: INTERNAL MEDICINE

## 2019-02-25 PROCEDURE — 94660 CPAP INITIATION&MGMT: CPT

## 2019-02-25 PROCEDURE — 99222 PR INITIAL HOSPITAL CARE,LEVL II: ICD-10-PCS | Mod: 57,25,GC, | Performed by: INTERNAL MEDICINE

## 2019-02-25 PROCEDURE — 37000009 HC ANESTHESIA EA ADD 15 MINS: Performed by: INTERNAL MEDICINE

## 2019-02-25 PROCEDURE — 63600175 PHARM REV CODE 636 W HCPCS: Performed by: NURSE ANESTHETIST, CERTIFIED REGISTERED

## 2019-02-25 PROCEDURE — 43235 PR EGD, FLEX, DIAGNOSTIC: ICD-10-PCS | Mod: ,,, | Performed by: INTERNAL MEDICINE

## 2019-02-25 PROCEDURE — 27000190 HC CPAP FULL FACE MASK W/VALVE

## 2019-02-25 PROCEDURE — D9220A PRA ANESTHESIA: ICD-10-PCS | Mod: ANES,,, | Performed by: ANESTHESIOLOGY

## 2019-02-25 PROCEDURE — 97535 SELF CARE MNGMENT TRAINING: CPT

## 2019-02-25 PROCEDURE — 43235 EGD DIAGNOSTIC BRUSH WASH: CPT | Performed by: INTERNAL MEDICINE

## 2019-02-25 PROCEDURE — 99222 1ST HOSP IP/OBS MODERATE 55: CPT | Mod: 57,25,GC, | Performed by: INTERNAL MEDICINE

## 2019-02-25 PROCEDURE — 99900035 HC TECH TIME PER 15 MIN (STAT)

## 2019-02-25 PROCEDURE — 94640 AIRWAY INHALATION TREATMENT: CPT

## 2019-02-25 PROCEDURE — 99232 SBSQ HOSP IP/OBS MODERATE 35: CPT | Mod: ,,, | Performed by: INTERNAL MEDICINE

## 2019-02-25 RX ORDER — PROPOFOL 10 MG/ML
VIAL (ML) INTRAVENOUS CONTINUOUS PRN
Status: DISCONTINUED | OUTPATIENT
Start: 2019-02-25 | End: 2019-02-25

## 2019-02-25 RX ORDER — PANTOPRAZOLE SODIUM 40 MG/1
40 TABLET, DELAYED RELEASE ORAL DAILY
Status: DISCONTINUED | OUTPATIENT
Start: 2019-02-26 | End: 2019-03-08 | Stop reason: HOSPADM

## 2019-02-25 RX ORDER — PROPOFOL 10 MG/ML
VIAL (ML) INTRAVENOUS
Status: DISCONTINUED | OUTPATIENT
Start: 2019-02-25 | End: 2019-02-25

## 2019-02-25 RX ORDER — HYDROMORPHONE HYDROCHLORIDE 1 MG/ML
0.2 INJECTION, SOLUTION INTRAMUSCULAR; INTRAVENOUS; SUBCUTANEOUS EVERY 5 MIN PRN
Status: DISCONTINUED | OUTPATIENT
Start: 2019-02-25 | End: 2019-02-25 | Stop reason: HOSPADM

## 2019-02-25 RX ORDER — SODIUM CHLORIDE 9 MG/ML
INJECTION, SOLUTION INTRAVENOUS CONTINUOUS PRN
Status: DISCONTINUED | OUTPATIENT
Start: 2019-02-25 | End: 2019-02-25

## 2019-02-25 RX ORDER — LIDOCAINE HCL/PF 100 MG/5ML
SYRINGE (ML) INTRAVENOUS
Status: DISCONTINUED | OUTPATIENT
Start: 2019-02-25 | End: 2019-02-25

## 2019-02-25 RX ORDER — SODIUM CHLORIDE 0.9 % (FLUSH) 0.9 %
3 SYRINGE (ML) INJECTION
Status: DISCONTINUED | OUTPATIENT
Start: 2019-02-25 | End: 2019-03-08 | Stop reason: HOSPADM

## 2019-02-25 RX ORDER — MEPERIDINE HYDROCHLORIDE 25 MG/ML
12.5 INJECTION INTRAMUSCULAR; INTRAVENOUS; SUBCUTANEOUS EVERY 10 MIN PRN
Status: DISCONTINUED | OUTPATIENT
Start: 2019-02-25 | End: 2019-02-25 | Stop reason: HOSPADM

## 2019-02-25 RX ORDER — SODIUM CHLORIDE 0.9 % (FLUSH) 0.9 %
3 SYRINGE (ML) INJECTION
Status: DISCONTINUED | OUTPATIENT
Start: 2019-02-25 | End: 2019-02-25 | Stop reason: HOSPADM

## 2019-02-25 RX ADMIN — MONTELUKAST SODIUM 10 MG: 10 TABLET, FILM COATED ORAL at 11:02

## 2019-02-25 RX ADMIN — PROPOFOL 100 MCG/KG/MIN: 10 INJECTION, EMULSION INTRAVENOUS at 01:02

## 2019-02-25 RX ADMIN — PROPOFOL 10 MG: 10 INJECTION, EMULSION INTRAVENOUS at 01:02

## 2019-02-25 RX ADMIN — ACETYLCYSTEINE 4 ML: 100 SOLUTION ORAL; RESPIRATORY (INHALATION) at 07:02

## 2019-02-25 RX ADMIN — IPRATROPIUM BROMIDE AND ALBUTEROL SULFATE 3 ML: .5; 3 SOLUTION RESPIRATORY (INHALATION) at 08:02

## 2019-02-25 RX ADMIN — ACETYLCYSTEINE 4 ML: 100 SOLUTION ORAL; RESPIRATORY (INHALATION) at 03:02

## 2019-02-25 RX ADMIN — LEVOTHYROXINE SODIUM 112 MCG: 112 TABLET ORAL at 05:02

## 2019-02-25 RX ADMIN — METOPROLOL TARTRATE 50 MG: 50 TABLET ORAL at 11:02

## 2019-02-25 RX ADMIN — LIDOCAINE HYDROCHLORIDE 80 MG: 20 INJECTION, SOLUTION INTRAVENOUS at 01:02

## 2019-02-25 RX ADMIN — SODIUM CHLORIDE: 0.9 INJECTION, SOLUTION INTRAVENOUS at 01:02

## 2019-02-25 RX ADMIN — IPRATROPIUM BROMIDE AND ALBUTEROL SULFATE 3 ML: .5; 3 SOLUTION RESPIRATORY (INHALATION) at 12:02

## 2019-02-25 RX ADMIN — HYDROCODONE BITARTRATE AND ACETAMINOPHEN 1 TABLET: 5; 325 TABLET ORAL at 11:02

## 2019-02-25 RX ADMIN — GABAPENTIN 100 MG: 100 CAPSULE ORAL at 11:02

## 2019-02-25 RX ADMIN — TIOTROPIUM BROMIDE 18 MCG: 18 CAPSULE ORAL; RESPIRATORY (INHALATION) at 08:02

## 2019-02-25 RX ADMIN — IPRATROPIUM BROMIDE AND ALBUTEROL SULFATE 3 ML: .5; 3 SOLUTION RESPIRATORY (INHALATION) at 07:02

## 2019-02-25 RX ADMIN — VERAPAMIL HYDROCHLORIDE 80 MG: 80 TABLET, FILM COATED ORAL at 08:02

## 2019-02-25 RX ADMIN — IPRATROPIUM BROMIDE AND ALBUTEROL SULFATE 3 ML: .5; 3 SOLUTION RESPIRATORY (INHALATION) at 03:02

## 2019-02-25 RX ADMIN — FLUTICASONE PROPIONATE 50 MCG: 50 SPRAY, METERED NASAL at 08:02

## 2019-02-25 RX ADMIN — GABAPENTIN 100 MG: 100 CAPSULE ORAL at 08:02

## 2019-02-25 RX ADMIN — VERAPAMIL HYDROCHLORIDE 80 MG: 80 TABLET, FILM COATED ORAL at 02:02

## 2019-02-25 RX ADMIN — METOPROLOL TARTRATE 50 MG: 50 TABLET ORAL at 08:02

## 2019-02-25 RX ADMIN — HYDROCODONE BITARTRATE AND ACETAMINOPHEN 1 TABLET: 5; 325 TABLET ORAL at 04:02

## 2019-02-25 RX ADMIN — PROPOFOL 20 MG: 10 INJECTION, EMULSION INTRAVENOUS at 01:02

## 2019-02-25 RX ADMIN — ACETYLCYSTEINE 4 ML: 100 SOLUTION ORAL; RESPIRATORY (INHALATION) at 08:02

## 2019-02-25 NOTE — PLAN OF CARE
Problem: Adult Inpatient Plan of Care  Goal: Plan of Care Review  Outcome: Ongoing (interventions implemented as appropriate)   02/25/19 1507   Plan of Care Review   Plan of Care Reviewed With patient   Pt remains free from falls and injuries during shift. Alert, and oriented x 4. Vital signs stable. Oxygen levels WNL on nasal cannula. Bipap qhs. PRN medication given for pain and anxiety.  No signs of acute distress noted at this time. Bed in lowest position, wheels locked, call light in reach. Son at the bedside, will continue to monitor. Safety maintained.

## 2019-02-25 NOTE — PT/OT/SLP PROGRESS
Occupational Therapy   Treatment    Name: Makenzie Leija  MRN: [id number]  Admitting Diagnosis:  Acute on chronic respiratory failure  Day of Surgery    Recommendations:     Discharge Recommendations: (HHOT)  Discharge Equipment Recommendations:  none  Barriers to discharge:  None    Assessment:     Makenzie Leija is a 78 y.o. female with a medical diagnosis of Acute on chronic respiratory failure.  She presents with Improved functional mobility. Performance deficits affecting function are weakness, impaired endurance, impaired self care skills, impaired functional mobilty, decreased upper extremity function, decreased lower extremity function, decreased safety awareness, edema, impaired cardiopulmonary response to activity.     Rehab Prognosis:  Fair; patient would benefit from acute skilled OT services to address these deficits and reach maximum level of function.       Plan:     Patient to be seen 3 x/week to address the above listed problems via self-care/home management, therapeutic activities, therapeutic exercises  · Plan of Care Expires: 03/21/19  · Plan of Care Reviewed with: patient, son    Subjective     Pain/Comfort:  · Pain Rating 1: 0/10  · Pain Rating Post-Intervention 1: 0/10    Objective:     Communicated with: RN prior to session.  Patient found HOB elevated with PureWick, pulse ox (continuous), telemetry, oxygen upon OT entry to room.    General Precautions: Standard, fall   Orthopedic Precautions:N/A   Braces: N/A     Occupational Performance:     Bed Mobility:    · Patient completed Rolling/Turning to Left with  modified independence  · Patient completed Scooting/Bridging with modified independence  · Patient completed Supine to Sit with modified independence     Functional Mobility/Transfers:  · Patient completed Sit <> Stand Transfer with stand by assistance  with  no assistive device   · Patient completed Bed <> Chair Transfer using Step Transfer technique with stand by assistance with no  assistive device  · Functional Mobility: Able to take 4-5 side steps to bedside chair.     Activities of Daily Living:  · Not addressed 2* pt fatigue.       [facility] 6 Click ADL: 16    Treatment & Education:  -Pt edu on OT role/POC  -Importance of OOB activity with staff assistance  -Safety during functional t/f and mobility  - White board updated  - Multiple self care tasks/functional mobility completed-- assistance level noted above  - All questions/concerns answered within OT scope of practice      Patient left up in chair with all lines intact, call button in reach, RN notified and son presentEducation:      GOALS:   Multidisciplinary Problems     Occupational Therapy Goals        Problem: Occupational Therapy Goal    Goal Priority Disciplines Outcome Interventions   Occupational Therapy Goal     OT, PT/OT Ongoing (interventions implemented as appropriate)    Description:  Goals to be met by: 3/21/2019    Patient will increase functional independence with ADLs by performing:    Toileting from toilet with Minimal Assistance for hygiene and clothing management.   Bathing from  standing at sink with Minimal Assistance.  Stand pivot transfers with Minimal Assistance. Met 2/25  Squat pivot transfers with Minimal Assistance. Met 2/25  Toilet transfer to bedside commode with Contact Guard Assistance.                       Time Tracking:     OT Date of Treatment: 02/25/19  OT Start Time: 1500  OT Stop Time: 1515  OT Total Time (min): 15 min    Billable Minutes:Self Care/Home Management 15 mins    Jason Malagon, OT  2/25/2019

## 2019-02-25 NOTE — PLAN OF CARE
Discharge instructions and MD report provided to patient.  Verbalized understanding.  PT stable, tolerating po fluids.  No complaints noted.  Adequate for transfer at this time.

## 2019-02-25 NOTE — H&P
Short Stay Endoscopy History and Physical    PCP - Karthik Roth MD     Procedure - EGD  ASA - per anesthesia  Mallampati - per anesthesia  History of Anesthesia problems - no  Family history Anesthesia problems -  no   Plan of anesthesia - General    HPI:  78 year old female with a history of HOCM, Pulmonary HTN, and MARISOL who presents for an EGD.    ROS:  Constitutional: No fevers, chills, No weight loss  CV: No chest pain  Pulm: No cough, No shortness of breath  Ophtho: No vision changes  GI: see HPI  Derm: No rash    Medical History:  has a past medical history of Anemia, Anxiety, Arthritis, Asthma, Atrial fibrillation, Cancer, Cataract, Clotting disorder, COPD (chronic obstructive pulmonary disease), Coronary artery disease, Degenerative disc disease at L5-S1 level, Depression, Glaucoma, Heart murmur, History of stomach ulcers, Hypertension, Myocardial infarction, Neuromuscular disorder, Thyroid disease, and Tuberculosis.    Surgical History:  has a past surgical history that includes Colon surgery; Eye surgery; Cholecystectomy; and Tonsillectomy.    Family History: family history is not on file.. Otherwise no colon cancer, inflammatory bowel disease, or GI malignancies.    Social History:  reports that she quit smoking about 3 years ago. She does not have any smokeless tobacco history on file. She reports that she does not drink alcohol or use drugs.    Review of patient's allergies indicates:   Allergen Reactions    Augmentin [amoxicillin-pot clavulanate]     Cosopt [dorzolamide-timolol]     Cymbalta [duloxetine]     Isordil [isosorbide dinitrate]     Procardia [nifedipine]        Medications:   Medications Prior to Admission   Medication Sig Dispense Refill Last Dose    albuterol 90 mcg/actuation inhaler Inhale 2 puffs into the lungs every 6 (six) hours as needed for Wheezing.   Taking    albuterol-ipratropium 2.5mg-0.5mg/3mL (DUO-NEB) 0.5 mg-3 mg(2.5 mg base)/3 mL nebulizer solution Take 3 mLs by  nebulization every 6 (six) hours as needed for Wheezing.   Taking    ALPRAZolam (XANAX) 0.5 MG tablet Take 0.5 mg by mouth nightly as needed for Insomnia or Anxiety (SOB/anxiety/rest at night).       azilsartan medoxomil 80 mg Tab Take by mouth.   Taking    furosemide (LASIX) 40 MG tablet Take 40 mg by mouth 2 (two) times daily.       gabapentin (NEURONTIN) 100 MG capsule Take 100 mg by mouth 2 (two) times daily.   Taking    hydrocodone-acetaminophen 10-325mg (NORCO)  mg Tab Take by mouth every 6 (six) hours.   Taking    iron-vitamin C 100-250 mg, ICAR-C, 100-250 mg Tab Take by mouth.   Taking    levothyroxine (SYNTHROID) 112 MCG tablet Take 112 mcg by mouth once daily.   Taking    lidocaine (LIDODERM) 5 %(700 mg/patch) Place 1 patch onto the skin every 24 hours. Remove & Discard patch within 12 hours or as directed by MD   Taking    metformin (GLUCOPHAGE) 1000 MG tablet Take 1,000 mg by mouth 2 (two) times daily with meals.   Taking    metoprolol tartrate (LOPRESSOR) 50 MG tablet Take 50 mg by mouth 2 (two) times daily.   Taking    montelukast (SINGULAIR) 10 mg tablet Take 10 mg by mouth every evening.   Taking    potassium chloride (KLOR-CON) 20 mEq Pack Take 20 mEq by mouth 2 (two) times daily.   Taking    senna-docusate 8.6-50 mg (PERICOLACE) 8.6-50 mg per tablet Take 1 tablet by mouth once daily.   Not Taking    chlorthalidone (HYGROTEN) 25 MG Tab Take 25 mg by mouth once daily.   Taking    diltiazem (CARDIZEM) 30 MG tablet Take 30 mg by mouth 4 (four) times daily.   Taking    doxycycline (VIBRA-TABS) 100 MG tablet Take 100 mg by mouth 2 (two) times daily.   Not Taking    fluticasone (FLONASE) 50 mcg/actuation nasal spray 1 spray by Each Nare route once daily.   Not Taking    venlafaxine (EFFEXOR-XR) 37.5 MG 24 hr capsule Take 37.5 mg by mouth once daily.   Not Taking       Physical Exam:    Vital Signs:   Vitals:    02/25/19 1039   BP: 125/62   Pulse: (!) 50   Resp: (!) 24   Temp: 97.9  °F (36.6 °C)       General Appearance: Well appearing in no acute distress  Eyes:    No scleral icterus  ENT: Neck supple, Lips, mucosa, and tongue normal; teeth and gums normal  Lungs: CTA anteriorly  Heart:  Regular rate, S1, S2 normal, no murmurs heard.  Abdomen: Soft, non tender, non distended with normal bowel sounds. No hepatosplenomegaly, ascites, or mass.  Extremities: No edema  Skin: No rash    Labs:  Lab Results   Component Value Date    WBC 4.07 02/25/2019    HGB 7.4 (L) 02/25/2019    HCT 24.1 (L) 02/25/2019     02/25/2019    ALT 16 02/25/2019    AST 16 02/25/2019     02/25/2019    K 4.4 02/25/2019     02/25/2019    CREATININE 1.0 02/25/2019    BUN 31 (H) 02/25/2019    CO2 24 02/25/2019    INR 0.9 02/22/2019    HGBA1C 5.6 02/19/2019       I have explained the risks and benefits of endoscopy procedures to the patient including but not limited to bleeding, perforation, infection, and death.      Bryson Gamez M.D.  Gastroenterology Fellow, PGY-V  Pager: [phone]  [facility]

## 2019-02-25 NOTE — NURSING TRANSFER
Nursing Transfer Note      2/25/2019     Transfer To: 1[facility] From [facility]    Transfer via stretcher    Transfer with 3L to O2, cardiac monitoring    Transported by pct    Medicines sent: none    Chart send with patient: Yes    Notified: friend, RN - oscar    Patient reassessed at: 1400 on 2/25/19     Upon arrival to floor: cardiac monitor applied, patient oriented to room, call bell in reach and bed in lowest position

## 2019-02-25 NOTE — CONSULTS
[facility]  Gastroenterology  Consult Note    Patient Name: Makenzie SCHWARTZ Leija  MRN: [id number]  Admission Date: 2/19/2019  Hospital Length of Stay: 6 days  Code Status: Full Code   Attending Provider: Corina Dias MD   Consulting Provider: Ernie Tran MD  Primary Care Physician: Karthik Roth MD  Principal Problem:Acute on chronic respiratory failure    Inpatient consult to Gastroenterology  Consult performed by: Ernie Tran MD  Consult ordered by: Corina Dias MD        Subjective:     HPI:  This is a 77 yo F with hx significant for COPD home oxygen 3L, HFpEF, paroxysmal a.fib, CAD, HTN, HLD, DMII recent PE on AC w/ eliquis (12/2018), 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 GI is being consulted for melena and anemia. Patient reports that two days ago she noticed her stools were becoming darker and tarry. Her eliquis was stopped and she was on heparin gtt. Patient had an associated drop in Hg. Her last bowel movement was yesterday afternoon and was dark. She reports having similar episode in Nov 2018. She had an EGD and colonoscopy at OSH which she says was notable for some stomach wall irritation and four colon polyps. Patient is currently on 3L NC but this is her baseline at home.         Past Medical History:   Diagnosis Date    Anemia     Anxiety     Arthritis     Asthma     Atrial fibrillation     Cancer     Cataract     Clotting disorder     COPD (chronic obstructive pulmonary disease)     Coronary artery disease     Degenerative disc disease at L5-S1 level     Depression     Glaucoma     Heart murmur     History of stomach ulcers     Hypertension     Myocardial infarction      Neuromuscular disorder     Thyroid disease     Tuberculosis        Past Surgical History:   Procedure Laterality Date    CHOLECYSTECTOMY      COLON SURGERY      EYE SURGERY      TONSILLECTOMY         Review of patient's allergies indicates:   Allergen Reactions    Augmentin [amoxicillin-pot clavulanate]     Cosopt [dorzolamide-timolol]     Cymbalta [duloxetine]     Isordil [isosorbide dinitrate]     Procardia [nifedipine]      Family History     None        Tobacco Use    Smoking status: Former Smoker     Last attempt to quit: 1/14/2016     Years since quitting: 3.1   Substance and Sexual Activity    Alcohol use: No     Alcohol/week: 0.0 oz    Drug use: No    Sexual activity: Not on file     Review of Systems   Constitutional: Negative for activity change, appetite change, chills and fever.   HENT: Negative for sore throat and trouble swallowing.    Respiratory: Negative for cough and shortness of breath.    Cardiovascular: Negative for chest pain and leg swelling.   Gastrointestinal: Positive for blood in stool. Negative for abdominal distention, abdominal pain, constipation, diarrhea, nausea and vomiting.   Genitourinary: Negative for decreased urine volume and difficulty urinating.   Musculoskeletal: Negative for arthralgias and back pain.   Skin: Negative for color change and pallor.   Neurological: Negative for dizziness and light-headedness.   Psychiatric/Behavioral: Negative for agitation and confusion.     Objective:     Vital Signs (Most Recent):  Temp: 97.9 °F (36.6 °C) (02/25/19 1039)  Pulse: (!) 50 (02/25/19 1039)  Resp: (!) 24 (02/25/19 1039)  BP: 125/62 (02/25/19 1039)  SpO2: 97 % (02/25/19 1039) Vital Signs (24h Range):  Temp:  [97.7 °F (36.5 °C)-98.1 °F (36.7 °C)] 97.9 °F (36.6 °C)  Pulse:  [50-86] 50  Resp:  [17-26] 24  SpO2:  [93 %-98 %] 97 %  BP: (102-125)/(52-67) 125/62     Weight: 88.9 kg (195 lb 15.8 oz) (02/25/19 0400)  Body mass index is 32.61 kg/m².      Intake/Output Summary  (Last 24 hours) at 2/25/2019 1123  Last data filed at 2/25/2019 0448  Gross per 24 hour   Intake 240 ml   Output 600 ml   Net -360 ml       Lines/Drains/Airways     Peripherally Inserted Central Catheter Line                 PICC Double Lumen -- days          Drain            Female External Urinary Catheter 02/21/19 0800 4 days                Physical Exam   Constitutional: She is oriented to person, place, and time. She appears well-developed and well-nourished. No distress.   Eyes: No scleral icterus.   Cardiovascular: Normal rate and regular rhythm.   Pulmonary/Chest: Effort normal. No respiratory distress.   On 3L NC   Abdominal: Soft. Bowel sounds are normal. She exhibits no distension. There is no tenderness.   Musculoskeletal: She exhibits no edema or deformity.   Neurological: She is alert and oriented to person, place, and time.   Skin: Skin is warm and dry.   Psychiatric: She has a normal mood and affect.   Vitals reviewed.      Significant Labs:  CBC:   Recent Labs   Lab 02/23/19  1206 02/24/19  0355 02/25/19  0345   WBC  --  4.99 4.07   HGB 7.7*  7.7* 7.3*  7.3* 7.4*   HCT 25.7*  25.7* 24.0*  24.0* 24.1*   PLT  --  211 223     CMP:   Recent Labs   Lab 02/25/19  0345      CALCIUM 9.1   ALBUMIN 2.5*   PROT 6.1      K 4.4   CO2 24      BUN 31*   CREATININE 1.0   ALKPHOS 75   ALT 16   AST 16   BILITOT 0.3     Coagulation:   Recent Labs   Lab 02/24/19  0355   APTT 49.4*         Assessment/Plan:     Acute blood loss anemia    77 yo F with hx significant for COPD home oxygen 3L, HFpEF, paroxysmal a.fib, CAD, HTN, HLD, DMII recent PE on AC w/ eliquis (12/2018), 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 Patient now  with black stools in the setting of anemia while on anticoagulation. Will further evaluate with EGD.    Recommendations:  - Hold heparin gtt  - Continue PPI IV BID  - Keep NPO  - Plan for EGD today         Thank you for your consult. I will follow-up with patient. Please contact us if you have any additional questions.    Ernie Tran MD  Gastroenterology  [facility]

## 2019-02-25 NOTE — ANESTHESIA POSTPROCEDURE EVALUATION
"Anesthesia Post Evaluation    Patient: Makenzie SCHWARTZ Leija    Procedure(s) Performed: Procedure(s) (LRB):  EGD (ESOPHAGOGASTRODUODENOSCOPY) (N/A)    Final Anesthesia Type: general  Patient location during evaluation: PACU  Patient participation: Yes- Able to Participate  Level of consciousness: awake and alert and oriented  Post-procedure vital signs: reviewed and stable  Pain management: adequate  Airway patency: patent  PONV status at discharge: No PONV  Anesthetic complications: no      Cardiovascular status: stable  Respiratory status: unassisted, spontaneous ventilation and room air  Hydration status: euvolemic  Follow-up not needed.        Visit Vitals  /66   Pulse (!) 58   Temp 36.6 °C (97.9 °F) (Temporal)   Resp 20   Ht 5' 5" (1.651 m)   Wt 88.9 kg (195 lb 15.8 oz)   SpO2 96%   Breastfeeding? No   BMI 32.61 kg/m²       Pain/Mukesh Score: Pain Rating Prior to Med Admin: 9 (2/25/2019  4:52 AM)  Pain Rating Post Med Admin: 2 (2/25/2019  5:52 AM)  Mukesh Score: 10 (2/25/2019  1:46 PM)        "

## 2019-02-25 NOTE — SUBJECTIVE & OBJECTIVE
Past Medical History:   Diagnosis Date    Anemia     Anxiety     Arthritis     Asthma     Atrial fibrillation     Cancer     Cataract     Clotting disorder     COPD (chronic obstructive pulmonary disease)     Coronary artery disease     Degenerative disc disease at L5-S1 level     Depression     Glaucoma     Heart murmur     History of stomach ulcers     Hypertension     Myocardial infarction     Neuromuscular disorder     Thyroid disease     Tuberculosis        Past Surgical History:   Procedure Laterality Date    CHOLECYSTECTOMY      COLON SURGERY      EYE SURGERY      TONSILLECTOMY         Review of patient's allergies indicates:   Allergen Reactions    Augmentin [amoxicillin-pot clavulanate]     Cosopt [dorzolamide-timolol]     Cymbalta [duloxetine]     Isordil [isosorbide dinitrate]     Procardia [nifedipine]      Family History     None        Tobacco Use    Smoking status: Former Smoker     Last attempt to quit: 1/14/2016     Years since quitting: 3.1   Substance and Sexual Activity    Alcohol use: No     Alcohol/week: 0.0 oz    Drug use: No    Sexual activity: Not on file     Review of Systems   Constitutional: Negative for activity change, appetite change, chills and fever.   HENT: Negative for sore throat and trouble swallowing.    Respiratory: Negative for cough and shortness of breath.    Cardiovascular: Negative for chest pain and leg swelling.   Gastrointestinal: Positive for blood in stool. Negative for abdominal distention, abdominal pain, constipation, diarrhea, nausea and vomiting.   Genitourinary: Negative for decreased urine volume and difficulty urinating.   Musculoskeletal: Negative for arthralgias and back pain.   Skin: Negative for color change and pallor.   Neurological: Negative for dizziness and light-headedness.   Psychiatric/Behavioral: Negative for agitation and confusion.     Objective:     Vital Signs (Most Recent):  Temp: 97.9 °F (36.6 °C) (02/25/19  1039)  Pulse: (!) 50 (02/25/19 1039)  Resp: (!) 24 (02/25/19 1039)  BP: 125/62 (02/25/19 1039)  SpO2: 97 % (02/25/19 1039) Vital Signs (24h Range):  Temp:  [97.7 °F (36.5 °C)-98.1 °F (36.7 °C)] 97.9 °F (36.6 °C)  Pulse:  [50-86] 50  Resp:  [17-26] 24  SpO2:  [93 %-98 %] 97 %  BP: (102-125)/(52-67) 125/62     Weight: 88.9 kg (195 lb 15.8 oz) (02/25/19 0400)  Body mass index is 32.61 kg/m².      Intake/Output Summary (Last 24 hours) at 2/25/2019 1123  Last data filed at 2/25/2019 0448  Gross per 24 hour   Intake 240 ml   Output 600 ml   Net -360 ml       Lines/Drains/Airways     Peripherally Inserted Central Catheter Line                 PICC Double Lumen -- days          Drain            Female External Urinary Catheter 02/21/19 0800 4 days                Physical Exam   Constitutional: She is oriented to person, place, and time. She appears well-developed and well-nourished. No distress.   Eyes: No scleral icterus.   Cardiovascular: Normal rate and regular rhythm.   Pulmonary/Chest: Effort normal. No respiratory distress.   On 3L NC   Abdominal: Soft. Bowel sounds are normal. She exhibits no distension. There is no tenderness.   Musculoskeletal: She exhibits no edema or deformity.   Neurological: She is alert and oriented to person, place, and time.   Skin: Skin is warm and dry.   Psychiatric: She has a normal mood and affect.   Vitals reviewed.      Significant Labs:  CBC:   Recent Labs   Lab 02/23/19  1206 02/24/19  0355 02/25/19  0345   WBC  --  4.99 4.07   HGB 7.7*  7.7* 7.3*  7.3* 7.4*   HCT 25.7*  25.7* 24.0*  24.0* 24.1*   PLT  --  211 223     CMP:   Recent Labs   Lab 02/25/19  0345      CALCIUM 9.1   ALBUMIN 2.5*   PROT 6.1      K 4.4   CO2 24      BUN 31*   CREATININE 1.0   ALKPHOS 75   ALT 16   AST 16   BILITOT 0.3     Coagulation:   Recent Labs   Lab 02/24/19  0355   APTT 49.4*

## 2019-02-25 NOTE — HPI
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 GI is being consulted for melena and anemia. Patient reports that two days ago she noticed her stools were becoming darker and tarry. Her eliquis was stopped and she was on heparin gtt. Patient had an associated drop in Hg. Her last bowel movement was yesterday afternoon and was dark. She reports having similar episode in Nov 2018. She had an EGD and colonoscopy at OSH which she says was notable for some stomach wall irritation and four colon polyps. Patient is currently on 3L NC but this is her baseline at home.

## 2019-02-25 NOTE — PROGRESS NOTES
Hospital Medicine   Progress note   Team: [facility]OS[facility] MED O Corina Dias MD   Admit Date: 2/19/2019   [id number]    Code status: Full Code   Principal Problem: Acute on chronic respiratory failure     Interval hx:  Patient went for EGD this a.m., was nonacute.  Hemoglobin 7.4 this a.m..  Will start Xarelto and monitor for bleeding.  Patient does note some worsening shortness of breath today.  She notes 1 episode of darker stools overnight but was not sent for fecal occult.      ROS   Constitutional: Negative for chills, fever. +fatigue  HENT: Negative for sore throat, trouble swallowing.  +nosebleeds  Eyes: Negative for photophobia, visual disturbance.   Respiratory: +cough, +shortness of breath.    Cardiovascular: Negative for chest pain, palpitations, leg swelling.   Gastrointestinal: Negative for abdominal pain, constipation, diarrhea, nausea, vomiting. +melena  Endocrine: Negative for cold intolerance, heat intolerance.   Genitourinary: Negative for dysuria, frequency.   Musculoskeletal: Negative for arthralgias, myalgias.   Skin: Negative for rash, erythema , +wounds on abdomen from lovenox injections  Neurological: Negative for dizziness, syncope, light-headedness. +weakness  Psychiatric/Behavioral: Negative for confusion, hallucinations, +anxiety  All other systems reviewed and are negative.    PEx   Temp:  [98.1 °F (36.7 °C)]   Pulse:  [57-86]   Resp:  [17-26]   BP: (102-121)/(52-64)   SpO2:  [94 %-98 %]      I & O (Last 24H):     Intake/Output Summary (Last 24 hours) at 2/25/2019 0815  Last data filed at 2/25/2019 0448  Gross per 24 hour   Intake 240 ml   Output 600 ml   Net -360 ml       General appearance: no distress, alert and oriented x 3, chronically ill-appearing  HEENT:  conjunctivae/corneas clear, PERRL, mucous membranes moist, Platinum  Neck: supple, thyroid not enlarged  Pulm:   normal respiratory effort, decreased breath sounds in bases, difficult to assess 2/2 body habitus, no crackles or wheezes, no  rhonchi  Card: RRR, S1, S2 normal, no murmur, click, rub or gallop  Abd: soft, NT, ND, BS present; no masses, no organomegaly  Ext: no c/c/e  Pulses: 2+, symmetric  Skin: color, texture, turgor normal.  Healing ecchymoses noted over abdomen, mildly tender to palpation  Neuro: CN II-XII grossly intact, no focal numbness or weakness, normal strength and tone   Psych: normal mood and affect      Recent Results (from the past 24 hour(s))   POCT glucose    Collection Time: 02/24/19  8:23 AM   Result Value Ref Range    POCT Glucose 141 (H) 70 - 110 mg/dL   POCT glucose    Collection Time: 02/24/19 11:24 AM   Result Value Ref Range    POCT Glucose 194 (H) 70 - 110 mg/dL   POCT glucose    Collection Time: 02/24/19  4:37 PM   Result Value Ref Range    POCT Glucose 150 (H) 70 - 110 mg/dL   POCT glucose    Collection Time: 02/24/19  8:42 PM   Result Value Ref Range    POCT Glucose 180 (H) 70 - 110 mg/dL   CBC auto differential    Collection Time: 02/25/19  3:45 AM   Result Value Ref Range    WBC 4.07 3.90 - 12.70 K/uL    RBC 2.61 (L) 4.00 - 5.40 M/uL    Hemoglobin 7.4 (L) 12.0 - 16.0 g/dL    Hematocrit 24.1 (L) 37.0 - 48.5 %    MCV 92 82 - 98 fL    MCH 28.4 27.0 - 31.0 pg    MCHC 30.7 (L) 32.0 - 36.0 g/dL    RDW 16.1 (H) 11.5 - 14.5 %    Platelets 223 150 - 350 K/uL    MPV 10.3 9.2 - 12.9 fL    Immature Granulocytes 0.7 (H) 0.0 - 0.5 %    Gran # (ANC) 2.6 1.8 - 7.7 K/uL    Immature Grans (Abs) 0.03 0.00 - 0.04 K/uL    Lymph # 0.6 (L) 1.0 - 4.8 K/uL    Mono # 0.6 0.3 - 1.0 K/uL    Eos # 0.2 0.0 - 0.5 K/uL    Baso # 0.04 0.00 - 0.20 K/uL    nRBC 0 0 /100 WBC    Gran% 63.1 38.0 - 73.0 %    Lymph% 14.3 (L) 18.0 - 48.0 %    Mono% 15.5 (H) 4.0 - 15.0 %    Eosinophil% 5.4 0.0 - 8.0 %    Basophil% 1.0 0.0 - 1.9 %    Differential Method Automated    Comprehensive metabolic panel    Collection Time: 02/25/19  3:45 AM   Result Value Ref Range    Sodium 137 136 - 145 mmol/L    Potassium 4.4 3.5 - 5.1 mmol/L    Chloride 104 95 - 110 mmol/L     CO2 24 23 - 29 mmol/L    Glucose 109 70 - 110 mg/dL    BUN, Bld 31 (H) 8 - 23 mg/dL    Creatinine 1.0 0.5 - 1.4 mg/dL    Calcium 9.1 8.7 - 10.5 mg/dL    Total Protein 6.1 6.0 - 8.4 g/dL    Albumin 2.5 (L) 3.5 - 5.2 g/dL    Total Bilirubin 0.3 0.1 - 1.0 mg/dL    Alkaline Phosphatase 75 55 - 135 U/L    AST 16 10 - 40 U/L    ALT 16 10 - 44 U/L    Anion Gap 9 8 - 16 mmol/L    eGFR if African American >60.0 >60 mL/min/1.73 m^2    eGFR if non  53.7 (A) >60 mL/min/1.73 m^2   Magnesium    Collection Time: 02/25/19  3:45 AM   Result Value Ref Range    Magnesium 2.0 1.6 - 2.6 mg/dL       Recent Labs   Lab 02/23/19  1623 02/23/19  2038 02/24/19  0823 02/24/19  1124 02/24/19  1637 02/24/19  2042   POCTGLUCOSE 225* 236* 141* 194* 150* 180*       Hemoglobin A1C   Date Value Ref Range Status   02/19/2019 5.6 4.0 - 5.6 % Final     Comment:     ADA Screening Guidelines:  5.7-6.4%  Consistent with prediabetes  >or=6.5%  Consistent with diabetes  High levels of fetal hemoglobin interfere with the HbA1C  assay. Heterozygous hemoglobin variants (HbS, HgC, etc)do  not significantly interfere with this assay.   However, presence of multiple variants may affect accuracy.          Active Hospital Problems    Diagnosis  POA    *Acute on chronic respiratory failure [J96.20]  Yes    HOCM (hypertrophic obstructive cardiomyopathy) [I42.1]  Yes    Acute on chronic diastolic congestive heart failure, NYHA class 3 [I50.33]  Yes      Resolved Hospital Problems   No resolved problems to display.         acetylcysteine 100 mg/ml (10%)  4 mL Nebulization TID WAKE    albuterol-ipratropium  3 mL Nebulization Q6H    fluticasone  1 spray Each Nare Daily    fluticasone-vilanterol  1 puff Inhalation Daily    gabapentin  100 mg Oral BID    levothyroxine  112 mcg Oral Before breakfast    metoprolol tartrate  50 mg Oral BID    montelukast  10 mg Oral QHS    tiotropium  1 capsule Inhalation Daily    verapamil  80 mg Oral TID      sodium chloride, acetaminophen, albuterol, albuterol-ipratropium, ALPRAZolam, dextrose 50%, dextrose 50%, glucagon (human recombinant), glucose, glucose, HYDROcodone-acetaminophen, insulin aspart U-100      Assessment and Plan for problems addressed today:   1. Acute on chronic respiratory failure  - Patient acute respiratory failure is multifactorial secondary to: moderate COPD, pulmonary HTN (PA 49), HOCM, PE, MARISOL (CPAP at 4), bilateral pulmonary effusion, hx of RUL cancer s/p chemo&XRT, recently tx for CAP zosyn x 12 days   - treated for PNA at OSH, no signs of infection. No further txt required   - per OSH records patient not tolerating CPAP at night, would get hypoxic and was switched over to BiPAP  - will continue BiPAP qhs  - pulmonary consulted; appreciate assistance - signed off   - wean O2 as able  - check CXR 2/2 SOB this am   - IS     2. 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   - has been evaluated by hem/onc as outpatient and all workup was normal   - s/p 1 unit PRBCs  - H/H q8  - hemoglobin 7.3 this a.m.  - EGD today, nonacute  Impression:           - Normal esophagus.                        - Normal stomach.                        - Normal examined duodenum.                        - No specimens collected.  Recommendation:       - Return patient to [facility] for ongoing care.                        - Use a proton pump inhibitor PO daily                         prophylactically if planning on anticoagulation.                        - The findings and recommendations were discussed                         with the designated responsible adult.    3. Hypothyroidism  - continue home dose synthroid      4. Pulmonary HTN  - pulmonary artery systolic pressure (PASP)= 49  - Mean PAP can be approximated because mPAP = 0.61sPAP + 2  - Mean PAP= 31.89 (32)     5. HOCM (hypertrophic obstructive  cardiomyopathy)  - Hypertrophic cardiomyopathy with asymmetric septal hypertrophy  - Systolic anterior motion of the mitral valve with severe outflow tract left ventricular obstruction. The peak gradient is near 100mm Hg.  - continue BB and CCB  - uptitrate medications as tolerated   - cards following   - will need outpatient interventional cardiology follow-up for ablation      6. MARISOL on CPAP  - BiPAP qhs 12/8     7. Type 2 diabetes mellitus  - Low dose insulin sliding scale   - bedside glucose monitoring      8. Pleural effusion, bilateral  - Thoracentesis 2/8 consistent w/ transudate (OSH studies)  - Thoracentesis 12/2018 cytology negative for malignancy  - Bilateral effusions notes on CXR      9. Chronic pulmonary embolism  - Hx of RML embolus diagnosed 12/2018  - h/h down trending 7.0 (8.5 on admission)  - discontinued lovenox   - discussed with pulmonology and recommend starting on heparin drip to see if she tolerates, will start on Xarelto as patient did not tolerate Eliquis  - need to monitor in-house for bleeding     10. Acute on chronic diastolic congestive heart failure, NYHA class 3    Echo 2/19/2019  · Hypertrophic cardiomyopathy with asymmetric septal hypertrophy  · Normal left ventricular systolic function. The estimated ejection fraction is 65%  · Normal right ventricular systolic function.  · Severe left atrial enlargement.  · Systolic anterior motion of the mitral valve with severe outflow tract left ventricular obstruction. The peak gradient is near 100mm Hg.  · The estimated PA systolic pressure is 49 mm Hg  · Intermediate central venous pressure (8 mm Hg).     - will hold off on diuresis; pt is preload dependent   - continue BB and CCB     11. COPD (chronic obstructive pulmonary disease)  - continue LAMA/ICS/LABA  - pulm following, appreciate recs   - duonebs q6 hrs     12. Anxiety  - on home xanax, will resume       Diet:  Cardiac  DVT PPx:  xarelto  Code status:  Full    Discharge plan and  follow up:  EGD negative for any bleeding, will start Xarelto tomorrow a.m., will need to monitor in house for a few days to ensure no bleeding    Corina Dias MD  Hospital Medicine Staff  [phone] pager

## 2019-02-25 NOTE — PLAN OF CARE
Problem: Occupational Therapy Goal  Goal: Occupational Therapy Goal  Goals to be met by: 3/21/2019    Patient will increase functional independence with ADLs by performing:    Toileting from toilet with Minimal Assistance for hygiene and clothing management.   Bathing from  standing at sink with Minimal Assistance.  Stand pivot transfers with Minimal Assistance.  Squat pivot transfers with Minimal Assistance.  Toilet transfer to bedside commode with Contact Guard Assistance.     Outcome: Ongoing (interventions implemented as appropriate)  Goals are still appropriate, continue w/ OT POC.

## 2019-02-25 NOTE — PLAN OF CARE
02/25/19 0843   Discharge Reassessment   Assessment Type Discharge Planning Reassessment   Discharge Plan A Home

## 2019-02-25 NOTE — ASSESSMENT & PLAN NOTE
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 Patient now with black stools in the setting of anemia while on anticoagulation. Will further evaluate with EGD.    Recommendations:  - Hold heparin gtt  - Continue PPI IV BID  - Keep NPO  - Plan for EGD today

## 2019-02-25 NOTE — [employer] PATIENT INSTRUCTIONS
Discharge Summary/Instructions after an Endoscopic Procedure  Patient Name: Makenzie Leija  Patient MRN: [id number]  Patient YOB: 1940  Monday, February 25, 2019  Oscar Johnson MD  RESTRICTIONS:  During your procedure today, you received medications for sedation.  These   medications may affect your judgment, balance and coordination.  Therefore,   for 24 hours, you have the following restrictions:   - DO NOT drive a car, operate machinery, make legal/financial decisions,   sign important papers or drink alcohol.    ACTIVITY:  Today: no heavy lifting, straining or running due to procedural   sedation/anesthesia.  The following day: return to full activity including work.  DIET:  Eat and drink normally unless instructed otherwise.     TREATMENT FOR COMMON SIDE EFFECTS:  - Mild abdominal pain, nausea, belching, bloating or excessive gas:  rest,   eat lightly and use a heating pad.  - Sore Throat: treat with throat lozenges and/or gargle with warm salt   water.  - Because air was used during the procedure, expelling large amounts of air   from your rectum or belching is normal.  - If a bowel prep was taken, you may not have a bowel movement for 1-3 days.    This is normal.  SYMPTOMS TO WATCH FOR AND REPORT TO YOUR PHYSICIAN:  1. Abdominal pain or bloating, other than gas cramps.  2. Chest pain.  3. Back pain.  4. Signs of infection such as: chills or fever occurring within 24 hours   after the procedure.  5. Rectal bleeding, which would show as bright red, maroon, or black stools.   (A tablespoon of blood from the rectum is not serious, especially if   hemorrhoids are present.)  6. Vomiting.  7. Weakness or dizziness.  GO DIRECTLY TO THE NEAREST EMERGENCY ROOM IF YOU HAVE ANY OF THE FOLLOWING:      Difficulty breathing              Chills and/or fever over 101 F   Persistent vomiting and/or vomiting blood   Severe abdominal pain   Severe chest pain   Black, tarry stools   Bleeding- more than one tablespoon   Any  other symptom or condition that you feel may need urgent attention  Your doctor recommends these additional instructions:  If any biopsies were taken, your doctors clinic will contact you in 1 to 2   weeks with any results.  - Return patient to hospital [facility] for ongoing care.   - Use a proton pump inhibitor PO daily prophylactically if planning on   anticoagulation.   - The findings and recommendations were discussed with the designated   responsible adult.  For questions, problems or results please call your physician - Oscar Johnson MD at Work:  [phone].  [facility], EMERGENCY ROOM PHONE NUMBER: [phone]  IF A COMPLICATION OR EMERGENCY SITUATION ARISES AND YOU ARE UNABLE TO REACH   YOUR PHYSICIAN - GO DIRECTLY TO THE EMERGENCY ROOM.  Oscar Johnson MD  2/25/2019 1:22:33 PM  This report has been verified and signed electronically.  [employer]

## 2019-02-25 NOTE — ANESTHESIA PREPROCEDURE EVALUATION
02/25/2019  Makenzie Leija is a 78 y.o., female presenting for EGD to [facility].    Past Medical History:   Diagnosis Date    Anemia     Anxiety     Arthritis     Asthma     Atrial fibrillation     Cancer     Cataract     Clotting disorder     COPD (chronic obstructive pulmonary disease)     Coronary artery disease     Degenerative disc disease at L5-S1 level     Depression     Glaucoma     Heart murmur     History of stomach ulcers     Hypertension     Myocardial infarction     Neuromuscular disorder     Thyroid disease     Tuberculosis      Past Surgical History:   Procedure Laterality Date    CHOLECYSTECTOMY      COLON SURGERY      EYE SURGERY      TONSILLECTOMY       Review of patient's allergies indicates:   Allergen Reactions    Augmentin [amoxicillin-pot clavulanate]     Cosopt [dorzolamide-timolol]     Cymbalta [duloxetine]     Isordil [isosorbide dinitrate]     Procardia [nifedipine]      No current facility-administered medications on file prior to encounter.      Current Outpatient Medications on File Prior to Encounter   Medication Sig Dispense Refill    albuterol 90 mcg/actuation inhaler Inhale 2 puffs into the lungs every 6 (six) hours as needed for Wheezing.      albuterol-ipratropium 2.5mg-0.5mg/3mL (DUO-NEB) 0.5 mg-3 mg(2.5 mg base)/3 mL nebulizer solution Take 3 mLs by nebulization every 6 (six) hours as needed for Wheezing.      ALPRAZolam (XANAX) 0.5 MG tablet Take 0.5 mg by mouth nightly as needed for Insomnia or Anxiety (SOB/anxiety/rest at night).      azilsartan medoxomil 80 mg Tab Take by mouth.      furosemide (LASIX) 40 MG tablet Take 40 mg by mouth 2 (two) times daily.      gabapentin (NEURONTIN) 100 MG capsule Take 100 mg by mouth 2 (two) times daily.      hydrocodone-acetaminophen 10-325mg (NORCO)  mg Tab Take by mouth every 6  (six) hours.      iron-vitamin C 100-250 mg, ICAR-C, 100-250 mg Tab Take by mouth.      levothyroxine (SYNTHROID) 112 MCG tablet Take 112 mcg by mouth once daily.      lidocaine (LIDODERM) 5 %(700 mg/patch) Place 1 patch onto the skin every 24 hours. Remove & Discard patch within 12 hours or as directed by MD      metformin (GLUCOPHAGE) 1000 MG tablet Take 1,000 mg by mouth 2 (two) times daily with meals.      metoprolol tartrate (LOPRESSOR) 50 MG tablet Take 50 mg by mouth 2 (two) times daily.      montelukast (SINGULAIR) 10 mg tablet Take 10 mg by mouth every evening.      potassium chloride (KLOR-CON) 20 mEq Pack Take 20 mEq by mouth 2 (two) times daily.      senna-docusate 8.6-50 mg (PERICOLACE) 8.6-50 mg per tablet Take 1 tablet by mouth once daily.      chlorthalidone (HYGROTEN) 25 MG Tab Take 25 mg by mouth once daily.      diltiazem (CARDIZEM) 30 MG tablet Take 30 mg by mouth 4 (four) times daily.      doxycycline (VIBRA-TABS) 100 MG tablet Take 100 mg by mouth 2 (two) times daily.      fluticasone (FLONASE) 50 mcg/actuation nasal spray 1 spray by Each Nare route once daily.      venlafaxine (EFFEXOR-XR) 37.5 MG 24 hr capsule Take 37.5 mg by mouth once daily.       Lab Results   Component Value Date    WBC 4.07 02/25/2019    HGB 7.4 (L) 02/25/2019    HCT 24.1 (L) 02/25/2019    MCV 92 02/25/2019     02/25/2019     BMP  Lab Results   Component Value Date     02/25/2019    K 4.4 02/25/2019     02/25/2019    CO2 24 02/25/2019    BUN 31 (H) 02/25/2019    CREATININE 1.0 02/25/2019    CALCIUM 9.1 02/25/2019    ANIONGAP 9 02/25/2019    ESTGFRAFRICA >60.0 02/25/2019    EGFRNONAA 53.7 (A) 02/25/2019         Anesthesia Evaluation    I have reviewed the Patient Summary Reports.     I have reviewed the Medications.     Review of Systems  Anesthesia Hx:  No problems with previous Anesthesia   Denies Personal Hx of Anesthesia complications.   Cardiovascular:   Exercise tolerance: poor Past  MI CAD   CHF    Pulmonary:   COPD, severe Sleep Apnea    Renal/:   Chronic Renal Disease    Neurological:   Denies CVA. Denies Seizures.        Physical Exam  General:  Obesity    Airway/Jaw/Neck:  Airway Findings: Mouth Opening: Normal Tongue: Normal  General Airway Assessment: Adult  Mallampati: II  TM Distance: Normal, at least 6 cm  Jaw/Neck Findings:  Neck ROM: Normal ROM      Dental:  Dental Findings: Periodontal disease, Severe         Mental Status:  Mental Status Findings:  Cooperative, Alert and Oriented         Anesthesia Plan  Type of Anesthesia, risks & benefits discussed:  Anesthesia Type:  general  Patient's Preference:   Intra-op Monitoring Plan: standard ASA monitors  Intra-op Monitoring Plan Comments:   Post Op Pain Control Plan: per primary service following discharge from PACU and IV/PO Opioids PRN  Post Op Pain Control Plan Comments:   Induction:   IV  Beta Blocker:  Patient is on a Beta-Blocker and has received one dose within the past 24 hours (No further documentation required).       Informed Consent: Patient understands risks and agrees with Anesthesia plan.  Questions answered. Anesthesia consent signed with patient.  ASA Score: 4     Day of Surgery Review of History & Physical:    H&P update referred to the surgeon.         Ready For Surgery From Anesthesia Perspective.

## 2019-02-25 NOTE — TRANSFER OF CARE
"Anesthesia Transfer of Care Note    Patient: Makenzie SCHWARTZ Leija    Procedure(s) Performed: Procedure(s) (LRB):  EGD (ESOPHAGOGASTRODUODENOSCOPY) (N/A)    Patient location: [facility]    Anesthesia Type: general    Transport from OR: Transported from OR on 2-3 L/min O2 by NC with adequate spontaneous ventilation    Post pain: adequate analgesia    Post assessment: no apparent anesthetic complications and tolerated procedure well    Post vital signs: stable    Level of consciousness: sedated and responds to stimulation    Nausea/Vomiting: no nausea/vomiting    Complications: none    Transfer of care protocol was followed      Last vitals:   Visit Vitals  /62 (BP Location: Right leg, Patient Position: Lying)   Pulse (!) 50   Temp 36.6 °C (97.9 °F) (Temporal)   Resp (!) 24   Ht 5' 5" (1.651 m)   Wt 88.9 kg (195 lb 15.8 oz)   SpO2 97%   Breastfeeding? No   BMI 32.61 kg/m²     "

## 2019-02-26 LAB
ABO + RH BLD: NORMAL
ALBUMIN SERPL BCP-MCNC: 2.4 G/DL
ALP SERPL-CCNC: 80 U/L
ALT SERPL W/O P-5'-P-CCNC: 14 U/L
ANION GAP SERPL CALC-SCNC: 8 MMOL/L
APTT BLDCRRT: 28.1 SEC
AST SERPL-CCNC: 13 U/L
BASOPHILS # BLD AUTO: 0.05 K/UL
BASOPHILS # BLD AUTO: 0.06 K/UL
BASOPHILS NFR BLD: 0.6 %
BASOPHILS NFR BLD: 1 %
BILIRUB SERPL-MCNC: 0.3 MG/DL
BLD GP AB SCN CELLS X3 SERPL QL: NORMAL
BLD PROD TYP BPU: NORMAL
BLOOD UNIT EXPIRATION DATE: NORMAL
BLOOD UNIT TYPE CODE: 5100
BLOOD UNIT TYPE: NORMAL
BUN SERPL-MCNC: 28 MG/DL
CALCIUM SERPL-MCNC: 8.9 MG/DL
CHLORIDE SERPL-SCNC: 105 MMOL/L
CO2 SERPL-SCNC: 24 MMOL/L
CODING SYSTEM: NORMAL
CREAT SERPL-MCNC: 0.8 MG/DL
DIFFERENTIAL METHOD: ABNORMAL
DISPENSE STATUS: NORMAL
EOSINOPHIL # BLD AUTO: 0.2 K/UL
EOSINOPHIL # BLD AUTO: 0.3 K/UL
EOSINOPHIL NFR BLD: 2.9 %
EOSINOPHIL NFR BLD: 3 %
ERYTHROCYTE [DISTWIDTH] IN BLOOD BY AUTOMATED COUNT: 15.4 %
ERYTHROCYTE [DISTWIDTH] IN BLOOD BY AUTOMATED COUNT: 15.9 %
EST. GFR  (AFRICAN AMERICAN): >60 ML/MIN/1.73 M^2
EST. GFR  (NON AFRICAN AMERICAN): >60 ML/MIN/1.73 M^2
GLUCOSE SERPL-MCNC: 125 MG/DL
HCT VFR BLD AUTO: 23.4 %
HCT VFR BLD AUTO: 26.8 %
HGB BLD-MCNC: 6.9 G/DL
HGB BLD-MCNC: 8.3 G/DL
IMM GRANULOCYTES # BLD AUTO: 0.04 K/UL
IMM GRANULOCYTES # BLD AUTO: 0.06 K/UL
IMM GRANULOCYTES NFR BLD AUTO: 0.6 %
IMM GRANULOCYTES NFR BLD AUTO: 0.8 %
INR PPP: 1
LYMPHOCYTES # BLD AUTO: 0.7 K/UL
LYMPHOCYTES # BLD AUTO: 0.9 K/UL
LYMPHOCYTES NFR BLD: 12.4 %
LYMPHOCYTES NFR BLD: 9.5 %
MAGNESIUM SERPL-MCNC: 1.8 MG/DL
MCH RBC QN AUTO: 27.9 PG
MCH RBC QN AUTO: 29.1 PG
MCHC RBC AUTO-ENTMCNC: 29.5 G/DL
MCHC RBC AUTO-ENTMCNC: 31 G/DL
MCV RBC AUTO: 94 FL
MCV RBC AUTO: 95 FL
MONOCYTES # BLD AUTO: 0.7 K/UL
MONOCYTES # BLD AUTO: 0.8 K/UL
MONOCYTES NFR BLD: 12.9 %
MONOCYTES NFR BLD: 7.9 %
NEUTROPHILS # BLD AUTO: 3.7 K/UL
NEUTROPHILS # BLD AUTO: 7.7 K/UL
NEUTROPHILS NFR BLD: 69.9 %
NEUTROPHILS NFR BLD: 78.5 %
NRBC BLD-RTO: 0 /100 WBC
PLATELET # BLD AUTO: 238 K/UL
PLATELET # BLD AUTO: 255 K/UL
PMV BLD AUTO: 10.1 FL
PMV BLD AUTO: 10.4 FL
POCT GLUCOSE: 133 MG/DL (ref 70–110)
POCT GLUCOSE: 153 MG/DL (ref 70–110)
POCT GLUCOSE: 209 MG/DL (ref 70–110)
POCT GLUCOSE: 243 MG/DL (ref 70–110)
POTASSIUM SERPL-SCNC: 4.4 MMOL/L
PROT SERPL-MCNC: 5.7 G/DL
PROTHROMBIN TIME: 10.5 SEC
RBC # BLD AUTO: 2.47 M/UL
RBC # BLD AUTO: 2.85 M/UL
SODIUM SERPL-SCNC: 137 MMOL/L
TRANS ERYTHROCYTES VOL PATIENT: NORMAL ML
WBC # BLD AUTO: 5.26 K/UL
WBC # BLD AUTO: 9.75 K/UL

## 2019-02-26 PROCEDURE — 94799 UNLISTED PULMONARY SVC/PX: CPT

## 2019-02-26 PROCEDURE — 83735 ASSAY OF MAGNESIUM: CPT

## 2019-02-26 PROCEDURE — 25000003 PHARM REV CODE 250: Performed by: INTERNAL MEDICINE

## 2019-02-26 PROCEDURE — 86920 COMPATIBILITY TEST SPIN: CPT

## 2019-02-26 PROCEDURE — 11000001 HC ACUTE MED/SURG PRIVATE ROOM

## 2019-02-26 PROCEDURE — 25000003 PHARM REV CODE 250: Performed by: HOSPITALIST

## 2019-02-26 PROCEDURE — 63600175 PHARM REV CODE 636 W HCPCS: Performed by: STUDENT IN AN ORGANIZED HEALTH CARE EDUCATION/TRAINING PROGRAM

## 2019-02-26 PROCEDURE — 94664 DEMO&/EVAL PT USE INHALER: CPT

## 2019-02-26 PROCEDURE — 25000003 PHARM REV CODE 250: Performed by: STUDENT IN AN ORGANIZED HEALTH CARE EDUCATION/TRAINING PROGRAM

## 2019-02-26 PROCEDURE — 99233 PR SUBSEQUENT HOSPITAL CARE,LEVL III: ICD-10-PCS | Mod: GC,,, | Performed by: HOSPITALIST

## 2019-02-26 PROCEDURE — 85025 COMPLETE CBC W/AUTO DIFF WBC: CPT | Mod: 91

## 2019-02-26 PROCEDURE — P9021 RED BLOOD CELLS UNIT: HCPCS

## 2019-02-26 PROCEDURE — 94640 AIRWAY INHALATION TREATMENT: CPT

## 2019-02-26 PROCEDURE — 80053 COMPREHEN METABOLIC PANEL: CPT

## 2019-02-26 PROCEDURE — 97116 GAIT TRAINING THERAPY: CPT

## 2019-02-26 PROCEDURE — 99900035 HC TECH TIME PER 15 MIN (STAT)

## 2019-02-26 PROCEDURE — 27000221 HC OXYGEN, UP TO 24 HOURS

## 2019-02-26 PROCEDURE — 82272 OCCULT BLD FECES 1-3 TESTS: CPT

## 2019-02-26 PROCEDURE — 85730 THROMBOPLASTIN TIME PARTIAL: CPT

## 2019-02-26 PROCEDURE — 94761 N-INVAS EAR/PLS OXIMETRY MLT: CPT

## 2019-02-26 PROCEDURE — 97530 THERAPEUTIC ACTIVITIES: CPT

## 2019-02-26 PROCEDURE — 99233 SBSQ HOSP IP/OBS HIGH 50: CPT | Mod: GC,,, | Performed by: HOSPITALIST

## 2019-02-26 PROCEDURE — 85610 PROTHROMBIN TIME: CPT

## 2019-02-26 PROCEDURE — 36430 TRANSFUSION BLD/BLD COMPNT: CPT

## 2019-02-26 PROCEDURE — 86850 RBC ANTIBODY SCREEN: CPT

## 2019-02-26 PROCEDURE — 94660 CPAP INITIATION&MGMT: CPT

## 2019-02-26 PROCEDURE — 25000242 PHARM REV CODE 250 ALT 637 W/ HCPCS: Performed by: STUDENT IN AN ORGANIZED HEALTH CARE EDUCATION/TRAINING PROGRAM

## 2019-02-26 PROCEDURE — 36415 COLL VENOUS BLD VENIPUNCTURE: CPT

## 2019-02-26 RX ORDER — ALPRAZOLAM 0.5 MG/1
0.5 TABLET ORAL NIGHTLY PRN
Status: DISCONTINUED | OUTPATIENT
Start: 2019-02-26 | End: 2019-03-01

## 2019-02-26 RX ORDER — HYDROCODONE BITARTRATE AND ACETAMINOPHEN 500; 5 MG/1; MG/1
TABLET ORAL
Status: DISCONTINUED | OUTPATIENT
Start: 2019-02-26 | End: 2019-03-03

## 2019-02-26 RX ORDER — DOCUSATE SODIUM 100 MG/1
100 CAPSULE, LIQUID FILLED ORAL DAILY
Status: DISCONTINUED | OUTPATIENT
Start: 2019-02-26 | End: 2019-03-08 | Stop reason: HOSPADM

## 2019-02-26 RX ORDER — HEPARIN SODIUM,PORCINE/D5W 25000/250
12 INTRAVENOUS SOLUTION INTRAVENOUS CONTINUOUS
Status: DISCONTINUED | OUTPATIENT
Start: 2019-02-26 | End: 2019-02-27 | Stop reason: SDUPTHER

## 2019-02-26 RX ADMIN — FLUTICASONE PROPIONATE 50 MCG: 50 SPRAY, METERED NASAL at 08:02

## 2019-02-26 RX ADMIN — ALPRAZOLAM 0.5 MG: 0.5 TABLET ORAL at 10:02

## 2019-02-26 RX ADMIN — HYDROCODONE BITARTRATE AND ACETAMINOPHEN 1 TABLET: 5; 325 TABLET ORAL at 07:02

## 2019-02-26 RX ADMIN — ACETYLCYSTEINE 4 ML: 100 SOLUTION ORAL; RESPIRATORY (INHALATION) at 01:02

## 2019-02-26 RX ADMIN — INSULIN ASPART 2 UNITS: 100 INJECTION, SOLUTION INTRAVENOUS; SUBCUTANEOUS at 11:02

## 2019-02-26 RX ADMIN — HEPARIN SODIUM AND DEXTROSE 12 UNITS/KG/HR: 10000; 5 INJECTION INTRAVENOUS at 06:02

## 2019-02-26 RX ADMIN — VERAPAMIL HYDROCHLORIDE 80 MG: 80 TABLET, FILM COATED ORAL at 10:02

## 2019-02-26 RX ADMIN — RIVAROXABAN 15 MG: 15 TABLET, FILM COATED ORAL at 08:02

## 2019-02-26 RX ADMIN — HYDROCODONE BITARTRATE AND ACETAMINOPHEN 1 TABLET: 5; 325 TABLET ORAL at 10:02

## 2019-02-26 RX ADMIN — MONTELUKAST SODIUM 10 MG: 10 TABLET, FILM COATED ORAL at 10:02

## 2019-02-26 RX ADMIN — IPRATROPIUM BROMIDE AND ALBUTEROL SULFATE 3 ML: .5; 3 SOLUTION RESPIRATORY (INHALATION) at 12:02

## 2019-02-26 RX ADMIN — PANTOPRAZOLE SODIUM 40 MG: 40 TABLET, DELAYED RELEASE ORAL at 06:02

## 2019-02-26 RX ADMIN — METOPROLOL TARTRATE 50 MG: 50 TABLET ORAL at 08:02

## 2019-02-26 RX ADMIN — TIOTROPIUM BROMIDE 18 MCG: 18 CAPSULE ORAL; RESPIRATORY (INHALATION) at 11:02

## 2019-02-26 RX ADMIN — ACETYLCYSTEINE 4 ML: 100 SOLUTION ORAL; RESPIRATORY (INHALATION) at 07:02

## 2019-02-26 RX ADMIN — GABAPENTIN 100 MG: 100 CAPSULE ORAL at 08:02

## 2019-02-26 RX ADMIN — IPRATROPIUM BROMIDE AND ALBUTEROL SULFATE 3 ML: .5; 3 SOLUTION RESPIRATORY (INHALATION) at 01:02

## 2019-02-26 RX ADMIN — VERAPAMIL HYDROCHLORIDE 80 MG: 80 TABLET, FILM COATED ORAL at 08:02

## 2019-02-26 RX ADMIN — FLUTICASONE FUROATE AND VILANTEROL TRIFENATATE 1 PUFF: 200; 25 POWDER RESPIRATORY (INHALATION) at 11:02

## 2019-02-26 RX ADMIN — LEVOTHYROXINE SODIUM 112 MCG: 112 TABLET ORAL at 06:02

## 2019-02-26 RX ADMIN — DOCUSATE SODIUM 100 MG: 100 CAPSULE, LIQUID FILLED ORAL at 06:02

## 2019-02-26 RX ADMIN — ALPRAZOLAM 0.5 MG: 0.5 TABLET ORAL at 01:02

## 2019-02-26 RX ADMIN — IPRATROPIUM BROMIDE AND ALBUTEROL SULFATE 3 ML: .5; 3 SOLUTION RESPIRATORY (INHALATION) at 07:02

## 2019-02-26 RX ADMIN — GABAPENTIN 100 MG: 100 CAPSULE ORAL at 10:02

## 2019-02-26 RX ADMIN — HYDROCODONE BITARTRATE AND ACETAMINOPHEN 1 TABLET: 5; 325 TABLET ORAL at 02:02

## 2019-02-26 RX ADMIN — METOPROLOL TARTRATE 50 MG: 50 TABLET ORAL at 10:02

## 2019-02-26 RX ADMIN — INSULIN ASPART 1 UNITS: 100 INJECTION, SOLUTION INTRAVENOUS; SUBCUTANEOUS at 10:02

## 2019-02-26 NOTE — HPI
Ms. 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      Per OSH records:  Pt presented on 2/5/29 via EMS for respiratory distress despite supplemental oxygen. Pt reports increased SOB over the last 4-5 days. On the day of admission significant SOB despite inhale and changing from oxygen compressor to tank. SpO2 75% on 3.5 L at home. Triage noted patient's SpO2 69% on 3L NC. Patient was placed on BiPaP w/ improvement in her respiratory function. CTA chest 2/7/19: without evidence of PE; moderate bilateral pleural effusions; RML consolidation. Thoracentesis 2/9/19: consistent with transudate. (thoracentesis 12/18 negative cytology). Patient was treated w/ antibiotics. Echo December 2018, IHSS, elevated peak gradient across LVOT at 179mmHg and a mean of 94mmHg. FELICIA performed. Patient remained in the hospital till 2/19/19; during which she was diuresed ~15L, started on BB yet remained symptomatic. And decision was made to transfer patient to [facility].

## 2019-02-26 NOTE — PLAN OF CARE
Problem: Physical Therapy Goal  Goal: Physical Therapy Goal  Goals to be met by: 3/6/2019     Patient will increase functional independence with mobility by performin. Supine to sit with Set-up [place]  2. Sit to supine with Set-up [place]  3. Sit to stand transfer with Contact Guard Assistance - Met       Revised: Sit to stand transfer with Supervision - Not Met  4. Bed to chair transfer with Contact Guard Assistance using Rolling Walker - Met      Revised: Bed to chair transfer with Supervision using Rolling Walker - Not Met  5. Gait  x 75 feet with Contact Guard Assistance using Rolling Walker. - Met      Revised: Gait  x 200 feet with Supervision using Rolling Walker. - Not Met        Discharge Recommendations: HH PT    Pt safe to transfer OOB and amb with RN/PCT and family: Use rollator with SBA.    Goals remain appropriate.     Estelle Ramos, PTA.   [phone]   2019

## 2019-02-26 NOTE — PT/OT/SLP PROGRESS
"Physical Therapy Treatment    Patient Name:  Makenzie SCHWARTZ Leija   MRN:  [id number]  Admitting Diagnosis: Acute on chronic respiratory failure  Recent Surgery: Procedure(s) (LRB):  EGD (ESOPHAGOGASTRODUODENOSCOPY) (N/A) 1 Day Post-Op    Recommendations:     Discharge Recommendations:  ( PT)   Discharge Equipment Recommendations: none   Barriers to discharge: None    Plan:     During this hospitalization, patient to be seen 3 x/week to address the above listed problems via gait training, therapeutic activities, therapeutic exercises  · Plan of Care Expires:  03/21/19   Plan of Care Reviewed with: patient, daughter    This Plan of care has been discussed with the patient who was involved in its development and understands and is in agreement with the identified goals and treatment plan    Subjective     Communicated with RN (Tabby) prior to session. RN reports pt to get blood transfusion    Patient comments: "I feel weak"  Pain/Comfort:  · Pain Rating 1: 0/10  · Pain Rating Post-Intervention 1: 0/10    Objective:     Patient found with: PureWick, telemetry, oxygen(2LPM via NC)    Patient found sitting UIC upon PT entry to room, agreeable to treatment.  Daughter present in the room.    General Precautions: Standard, fall, respiratory(SpO2 to remain 88% or above)   Orthopedic Precautions:N/A   Braces: N/A       BED MOBILITY (vc's for hand placement sequencing of task):        Rolling to the R:  NT.       Rolling to the L:  NT.        Sup > sit at the EOB:  Not performed 2* pt left seated UIC.       Sit > sup:  Sup for safety (HOB elevated).               TRANSFERS  (vc's for hand placement, sequencing of task and safety)   Patient completed Sit <> Stand Transfer from BS chair with SBA for safetu with rolling walker x1 trial(s)   Patient completed Stand <> Sit Transfer to EOB with SBA for safety with rolling walker        GAIT: within room   Patient ambulated: 65ft   Patient required: SBA for safety   Patient used:  " Rolling Walker   Gait Pattern observed: reciprocal gait   Gait Deviation(s): Increased michell, decreased step length, decreased stride length, decreased toe-to-floor clearance, decreased weight-shifting ability and mild instability, but no LOB    Comments: vc's for upright posture, reduced michell and safety    Pt's SpO2 monitored throughout tx session and it remains in the mid 90's while on 2LPM of O2 via NC      EDUCATION  Patient provided with daily orientation and goals of this PT session. They were educated to call for assistance and to transfer with hospital staff only.  Also, pt was educated on the effects of prolonged immobility and the importance of performing OOB activity to promote healing and reduce recovery time    Whiteboard updated with correct mobility information. RN/PCT notified.  Pt safe to transfer OOB and amb with RN/PCT and family: Use rollator with SBA.    Patient left sup in bed with HOB elevated, with  all lines intact, call button in reach, RN notified and daughter present    AM-PAC 6 CLICK MOBILITY  Turning over in bed (including adjusting bedclothes, sheets and blankets)?: 4  Sitting down on and standing up from a chair with arms (e.g., wheelchair, bedside commode, etc.): 2  Moving from lying on back to sitting on the side of the bed?: 4  Moving to and from a bed to a chair (including a wheelchair)?: 3  Need to walk in hospital room?: 3  Climbing 3-5 steps with a railing?: 1  Basic Mobility Total Score: 17     Assessment:     Makenzie Leija is a 78 y.o. female admitted with a medical diagnosis of Acute on chronic respiratory failure.  She presents with the following impairments/functional limitations:  weakness, impaired endurance, impaired self care skills, impaired functional mobilty, gait instability, impaired balance, impaired cardiopulmonary response to activity.  Pt doing well with transfers, bed mob and gait, however, she requires vc's for energy conservation, appropriate  breathing and safety 2* increased speed during activity.  Pt will cont to benefit from skilled PT intervention to address deficits and improve functional mobility.    Rehab Prognosis:  Good; patient would benefit from acute skilled PT services to address these deficits and reach maximum level of function.      GOALS:   Multidisciplinary Problems     Physical Therapy Goals        Problem: Physical Therapy Goal    Goal Priority Disciplines Outcome Goal Variances Interventions   Physical Therapy Goal     PT, PT/OT Ongoing (interventions implemented as appropriate)     Description:  Goals to be met by: 3/6/2019     Patient will increase functional independence with mobility by performin. Supine to sit with Set-up [place]  2. Sit to supine with Set-up [place]  3. Sit to stand transfer with Contact Guard Assistance - Met       Revised: Sit to stand transfer with Supervision - Not Met  4. Bed to chair transfer with Contact Guard Assistance using Rolling Walker - Met      Revised: Bed to chair transfer with Supervision using Rolling Walker - Not Met  5. Gait  x 75 feet with Contact Guard Assistance using Rolling Walker. - Met      Revised: Gait  x 200 feet with Supervision using Rolling Walker. - Not Met                       Time Tracking:     PT Received On: 19  PT Start Time: 1532     PT Stop Time: 1602  PT Total Time (min): 30 min     Billable Minutes: Gait Training 15 and Therapeutic Activity 15    Treatment Type: Treatment  PT/PTA: PTA     PTA Visit Number: 1       Estelle Ramos PTA.  Pager [phone]    2019    .

## 2019-02-26 NOTE — PLAN OF CARE
Problem: Adult Inpatient Plan of Care  Goal: Plan of Care Review  Outcome: Ongoing (interventions implemented as appropriate)  Patient remained in stable condition this shift.  Had EGD this morning which didn't reveal any bleeding.  Stool sample still needed. No BM this shift so far.  Lungs still coarse sounding. Able to ambulate with assistance short distances in the room. Family remained at bedside through shift. Alert and oriented.  No complaints of pain. Bed in lowest and locked position, call light in reach.  VSS

## 2019-02-26 NOTE — ASSESSMENT & PLAN NOTE
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   - has been evaluated by hem/onc as outpatient and all workup was normal   - s/p 1 unit PRBCs  - hemoglobin 6.9 this a.m.  - EGD 2/25 showed a Normal esophagus, stomach and duodenum.    Plan:  -will transfuse 1u of pRBC  - Use a proton pump inhibitor PO daily prophylactically since patient will be on Xarelto  -will continue to monitor patient closely given her slight drop on xarelto

## 2019-02-26 NOTE — ASSESSMENT & PLAN NOTE
- continue LAMA/ICS/LABA  - duonebs q6 hrs     Plan:  -appears at baseline  -will continue to monitor closely and will d/c with follow up with Pulmonology

## 2019-02-26 NOTE — ASSESSMENT & PLAN NOTE
- Patient acute respiratory failure is multifactorial secondary to: moderate COPD, pulmonary HTN (PA 49), HOCM, PE, MARISOL (CPAP at 4), bilateral pulmonary effusion, hx of RUL cancer s/p chemo&XRT, recently tx for CAP zosyn x 12 days   - treated for PNA at OSH, no signs of infection. No further txt required   - per OSH records patient not tolerating CPAP at night, would get hypoxic and was switched over to BiPAP  - pulmonary consulted; appreciate assistance but do not feel there is anything else to offer her inpatient    Plan  - will continue BiPAP qhs  - wean O2 as able  -on discharge will need to have follow up with pulm

## 2019-02-26 NOTE — ASSESSMENT & PLAN NOTE
-on Metformin at home and has great control (A1c was 5.6 on 2/19/19)  -patient has not required any SSI in last 48hrs  -BG remains in goal range    Plan  - Low dose insulin sliding scale   - bedside glucose monitoring

## 2019-02-26 NOTE — ASSESSMENT & PLAN NOTE
Echo 2/19/2019  · Hypertrophic cardiomyopathy with asymmetric septal hypertrophy  · Normal left ventricular systolic function. The estimated ejection fraction is 65%  · Normal right ventricular systolic function.  · Severe left atrial enlargement.  · Systolic anterior motion of the mitral valve with severe outflow tract left ventricular obstruction. The peak gradient is near 100mm Hg.  · The estimated PA systolic pressure is 49 mm Hg  · Intermediate central venous pressure (8 mm Hg).     Plan:  - will hold off on diuresis; pt is preload dependent   - continue metoprolol 50 mg BID and Verapamil 80 daily

## 2019-02-26 NOTE — SUBJECTIVE & OBJECTIVE
Interval History: NAEON. Patients breathing is slightly improved.  She remains on 3.5L NC.  She was started on xarelto this AM.  Slight drop in Hg to 6.9 but no overt bleed anywhere.     Review of Systems   Constitutional: Positive for fatigue. Negative for chills and fever.   HENT: Negative for sore throat and trouble swallowing.    Eyes: Negative for pain and visual disturbance.   Respiratory: Positive for cough (dry), chest tightness and shortness of breath (on exertion).    Cardiovascular: Negative for chest pain and palpitations.   Gastrointestinal: Negative for abdominal pain, blood in stool and nausea.   Genitourinary: Negative for difficulty urinating and dysuria.   Musculoskeletal: Negative for arthralgias and gait problem.   Neurological: Negative for dizziness and headaches.   Psychiatric/Behavioral: Negative for confusion. The patient is not nervous/anxious.      Objective:     Vital Signs (Most Recent):  Temp: 98.1 °F (36.7 °C) (02/26/19 1117)  Pulse: (!) 51 (02/26/19 1121)  Resp: 17 (02/26/19 1121)  BP: (!) 105/52 (02/26/19 1121)  SpO2: 98 % (02/26/19 1121) Vital Signs (24h Range):  Temp:  [97.9 °F (36.6 °C)-98.9 °F (37.2 °C)] 98.1 °F (36.7 °C)  Pulse:  [51-97] 51  Resp:  [16-28] 17  SpO2:  [95 %-99 %] 98 %  BP: (104-121)/(52-66) 105/52     Weight: 90.1 kg (198 lb 10.2 oz)  Body mass index is 33.05 kg/m².    Intake/Output Summary (Last 24 hours) at 2/26/2019 1126  Last data filed at 2/26/2019 0610  Gross per 24 hour   Intake 680 ml   Output 350 ml   Net 330 ml      Physical Exam   Constitutional: She is oriented to person, place, and time. She appears well-developed. No distress.   HENT:   Head: Normocephalic and atraumatic.   Neck: Neck supple. No tracheal deviation present.   Cardiovascular: Normal rate, regular rhythm and intact distal pulses.   Murmur heard.   Systolic murmur is present.  Pulmonary/Chest: She has decreased breath sounds in the right lower field. She has wheezes (mild diffuse  wheezes). She has rales (bibasilar).   On 3.5L nasal cannula, with SpO2 96%   Abdominal: Soft. She exhibits no distension.   Musculoskeletal: She exhibits edema (trace, bilateral lower extremities). She exhibits no deformity.   Neurological: She is alert and oriented to person, place, and time.   Skin: Skin is warm and dry.   Nursing note and vitals reviewed.      Significant Labs:   CBC:   Recent Labs   Lab 02/25/19  0345 02/26/19  0544   WBC 4.07 5.26   HGB 7.4* 6.9*   HCT 24.1* 23.4*    238     CMP:   Recent Labs   Lab 02/25/19  0345 02/26/19  0544    137   K 4.4 4.4    105   CO2 24 24    125*   BUN 31* 28*   CREATININE 1.0 0.8   CALCIUM 9.1 8.9   PROT 6.1 5.7*   ALBUMIN 2.5* 2.4*   BILITOT 0.3 0.3   ALKPHOS 75 80   AST 16 13   ALT 16 14   ANIONGAP 9 8   EGFRNONAA 53.7* >60.0       Significant Imaging: I have reviewed all pertinent imaging results/findings within the past 24 hours.

## 2019-02-26 NOTE — ASSESSMENT & PLAN NOTE
- Thoracentesis 2/8 consistent w/ transudate (OSH studies)  - Thoracentesis 12/2018 cytology negative for malignancy  - Bilateral effusions notes on CXR

## 2019-02-26 NOTE — ASSESSMENT & PLAN NOTE
-patient has reported hypothyroidism    Plan  - continue home dose synthroid   -will obtain a TSH tomorrow AM with labs

## 2019-02-26 NOTE — HOSPITAL COURSE
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  Echo was repeated at that time and medications were up titrated as patient tolerated.  Patient will need outpatient septal ablation and medical management of HOCM (metoprolol and verapamil) .  She developed some worsening respiratory distress and pulmonology was consulted.  At that time, she was restarted on Lovenox for her history of PE.  She developed an acute drop in her hemoglobin down to 7 and also had some subcutaneous ecchymoses noted in the abdomen.  Patient also requiring oxygen at 3 L via nasal cannula.  Cardiology requested transfer to Hospital Medicine for further workup of her anemia and hypoxia.  Patient transitioned to heparin drip and continued to have bleeding. GI was consulted and performed EGD 2/26 which was nonacute, and patient was started on xarelto. On 2/27, patient had drop in Hg with reports of dark brown stool so stopped xarelto and discussed anticoagulation risk and benefit (given her unprovoked PE in 12/2018 and her current bleed) she decided to continue with anticoagulation so started on heparin gtt.  She also had worsening SOB, started on lasix gtt and improved (has since transitioned to PO lasix).  On 3/3, she had drop in Hg again, so anticoagulation was stopped. On 3/4, she had a large dark tarry stool hem occult + so consulted GI for further evaluation. VCE performed on 3/7 without any evidence of acute bleed and Hb now stable for several days. OK to resume anticoagulation per GI. Will use apixaban given lower GI bleeding risk.    She will be discharged with close Interventional Cardiology and PCP f/u. Will need to have CBC monitored by  organization and given strict return  precautions. Also obtaining BiPAP for patient as well. Problem based discussion below.    Acute on chronic respiratory failure with hypoxia  - Patient acute respiratory failure is multifactorial secondary to: moderate COPD, pulmonary HTN (PA 49), HOCM, PE, MARISOL (CPAP at 4), bilateral pulmonary effusion, hx of RUL cancer s/p chemo&XRT, recently tx for CAP zosyn x 12 days   - treated for PNA at OSH, no signs of infection. No further txt required   - per OSH records patient not tolerating CPAP at night, would get hypoxic and was switched over to BiPAP  - will continue BiPAP qhs  - pulmonary consulted; will f/u as outpatient  - continue home O2     Acute blood loss anemia  - Patient w/ a history of ITP, bleeding diathesis, daughter with hemophilia B (implying pt is carrier), hx of bleeding.   - has been evaluated by hem/onc as outpatient and all workup was normal   - s/p 1 unit PRBCs  - CBC q12  - hemoglobin 7.7 and stable today  - EGD nonacute  Impression:   - Normal esophagus.                        - Normal stomach.                        - Normal examined duodenum.                        - No specimens collected.  Recommendation:       - Return patient to hospital [facility] for ongoing care.                        - Use a proton pump inhibitor PO daily                         prophylactically if planning on anticoagulation.                        - The findings and recommendations were discussed                         with the designated responsible adult.  - GI following: VCE on 3/7 without evidence of bleeding; ok to resume AC per GI     3. Hypothyroidism  - continue home dose synthroid      Pulmonary HTN  - pulmonary artery systolic pressure (PASP)= 49  - Mean PAP can be approximated because mPAP = 0.61sPAP + 2  - Mean PAP= 31.89 (32)     HOCM (hypertrophic obstructive cardiomyopathy)  - Hypertrophic cardiomyopathy with asymmetric septal hypertrophy  - Systolic anterior motion of the mitral valve with severe outflow  tract left ventricular obstruction. The peak gradient is near 100mm Hg.  - continue BB and CCB  - will need outpatient interventional cardiology follow-up for ablation (referral sent)     MARISOL on CPAP  - BiPAP qhs 12/8; obtaining outpt BiPAP     Type 2 diabetes mellitus  - resume home oral antiglycemics     Pleural effusion, bilateral  - Thoracentesis 2/8 consistent w/ transudate (OSH studies)  - Thoracentesis 12/2018 cytology negative for malignancy  - Bilateral effusions notes on CXR      Chronic pulmonary embolism  - Hx of RML embolus diagnosed 12/2018  - h/h now stable at 7.7  - discontinued lovenox   - discussed with pulmonology and recommended anticoagulation but unfortunately patient did not tolerate and developed bleeding  - will resume eliquis now on discharge given negative VCE (discussed with GI)     Acute on chronic diastolic congestive heart failure, NYHA class 3    Echo 2/19/2019  · Hypertrophic cardiomyopathy with asymmetric septal hypertrophy  · Normal left ventricular systolic function. The estimated ejection fraction is 65%  · Normal right ventricular systolic function.  · Severe left atrial enlargement.  · Systolic anterior motion of the mitral valve with severe outflow tract left ventricular obstruction. The peak gradient is near 100mm Hg.  · The estimated PA systolic pressure is 49 mm Hg  · Intermediate central venous pressure (8 mm Hg).     - continue Lasix 40 PO b.i.d.  - continue BB and CCB     COPD (chronic obstructive pulmonary disease)  - continue LAMA/ICS/LABA  - Pulm referral as outpatient   - duonebs q6 hrs      Anxiety  - on home xanax, will continue; f/u with outpt Pain Mgmt    Vitals:    03/08/19 1620   BP: (!) 146/66   Pulse: 71   Resp: 19   Temp: 98.6 °F (37 °C)     General appearance: no distress, alert and oriented x 3, chronically ill-appearing  HEENT:  conjunctivae/corneas clear, PERRL, mucous membranes moist, [place]  Neck: supple, thyroid not enlarged  Pulm:   normal respiratory  effort, decreased breath sounds in bases, difficult to assess 2/2 body habitus, no crackles or wheezes, no rhonchi, + O2 via nasal cannula @4L  Card: RRR, S1, S2 normal, no murmur, click, rub or gallop  Abd: soft, NT, ND, BS present; no masses, no organomegaly  Ext: no c/c/e  Pulses: 2+, symmetric  Skin: color, texture, turgor normal.   Neuro: CN II-XII grossly intact, no focal numbness or weakness, normal strength and tone   Psych: normal mood and affect

## 2019-02-26 NOTE — ASSESSMENT & PLAN NOTE
- Hypertrophic cardiomyopathy with asymmetric septal hypertrophy  - Systolic anterior motion of the mitral valve with severe outflow tract left ventricular obstruction. The peak gradient is near 100mm Hg.    Plan:  - continue BB and CCB  - uptitrate medications as tolerated   - cards following, appreciate recs   - will need outpatient interventional cardiology follow-up for ablation

## 2019-02-26 NOTE — PROGRESS NOTES
Patient's /52 and tele also called and said that she had a run of V tach.  MD BILLY Verdin notified and stated to hold the verapamil.

## 2019-02-26 NOTE — PROGRESS NOTES
[facility] Hospital Medicine  Progress Note    Patient Name: Makenzie Leija  MRN: [id number]  Patient Class: IP- Inpatient   Admission Date: 2/19/2019  Length of Stay: 7 days  Attending Physician: Lena Jamison MD  Primary Care Provider: Karthik Roth MD    Hospital Medicine Team: [facility] O Cristy Verdin MD    Subjective:     Principal Problem:Acute on chronic respiratory failure    HPI:  Ms. 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      Per OSH records:  Pt presented on 2/5/29 via EMS for respiratory distress despite supplemental oxygen. Pt reports increased SOB over the last 4-5 days. On the day of admission significant SOB despite inhale and changing from oxygen compressor to tank. SpO2 75% on 3.5 L at home. Triage noted patient's SpO2 69% on 3L NC. Patient was placed on BiPaP w/ improvement in her respiratory function. CTA chest 2/7/19: without evidence of PE; moderate bilateral pleural effusions; RML consolidation. Thoracentesis 2/9/19: consistent with transudate. (thoracentesis 12/18 negative cytology). Patient was treated w/ antibiotics. Echo December 2018, IHSS, elevated peak gradient across LVOT at 179mmHg and a mean of 94mmHg. FELICIA performed. Patient remained in the hospital till 2/19/19; during which she was diuresed ~15L, started on BB yet remained symptomatic. And decision was made to transfer patient to [facility].     Ms. 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      Per OSH records:  Pt presented on 2/5/29 via EMS for respiratory distress despite supplemental oxygen. Pt reports increased SOB over the last 4-5 days. On the day of admission significant SOB despite inhale and changing from oxygen compressor to tank. SpO2 75% on 3.5 L at home. Triage noted patient's SpO2 69% on 3L NC. Patient was placed on BiPaP w/ improvement in her respiratory function. CTA chest 2/7/19: without evidence of PE; moderate bilateral pleural effusions; RML consolidation. Thoracentesis 2/9/19: consistent with transudate. (thoracentesis 12/18 negative cytology). Patient was treated w/ antibiotics. Echo December 2018, IHSS, elevated peak gradient across LVOT at 179mmHg and a mean of 94mmHg. FELICIA performed. Patient remained in the hospital till 2/19/19; during which she was diuresed ~15L, started on BB yet remained symptomatic. And decision was made to transfer patient to [facility].             Hospital Course:  Transferred to [facility] for higher level of cardiac care. Admitted to [facility] for acute diastolic heart failure HOCM with pulmonary edema and bilateral pleural effusions. Patient admitted 2/19/19. 2D echo repeated; septal size 2.2cm, ROSAURA with severe outflow tract left ventricular obstruction. The peak gradient is near 100mm Hg. Patient started on BB and CCB. Will need ablation as an outpatient.     2/21/19 patient h/h noted to be 7 down from 8.5 and subcutaneous ecchymosis noted around her lower abdomen. Lovenox stopped and patient was transferred to hospital medicine. EGD on 2/25/19 showed no acute findings, and no source of bleed.  2/26/19,  patients Hg dropped to 6.9 and she received her second blood transfusion.         Interval History: NAEON. Patients breathing is slightly improved.  She remains on 3.5L NC.  She was started on xarelto this AM.  Slight drop in Hg to 6.9 but no overt bleed anywhere.     Review of Systems   Constitutional: Positive for fatigue. Negative for chills and fever.   HENT: Negative for sore throat and trouble swallowing.    Eyes: Negative for pain and visual disturbance.   Respiratory: Positive for cough (dry), chest tightness and shortness of breath (on exertion).    Cardiovascular: Negative for chest pain and palpitations.   Gastrointestinal: Negative for abdominal pain, blood in stool and nausea.   Genitourinary: Negative for difficulty urinating and dysuria.   Musculoskeletal: Negative for arthralgias and gait problem.   Neurological: Negative for dizziness and headaches.   Psychiatric/Behavioral: Negative for confusion. The patient is not nervous/anxious.      Objective:     Vital Signs (Most Recent):  Temp: 98.1 °F (36.7 °C) (02/26/19 1117)  Pulse: (!) 51 (02/26/19 1121)  Resp: 17 (02/26/19 1121)  BP: (!) 105/52 (02/26/19 1121)  SpO2: 98 % (02/26/19 1121) Vital Signs (24h Range):  Temp:  [97.9 °F (36.6 °C)-98.9 °F (37.2 °C)] 98.1 °F (36.7 °C)  Pulse:  [51-97] 51  Resp:  [16-28] 17  SpO2:  [95 %-99 %] 98 %  BP: (104-121)/(52-66) 105/52     Weight: 90.1 kg (198 lb 10.2 oz)  Body mass index is 33.05 kg/m².    Intake/Output Summary (Last 24 hours) at 2/26/2019 1126  Last data filed at 2/26/2019 0610  Gross per 24 hour   Intake 680 ml   Output 350 ml   Net 330 ml      Physical Exam   Constitutional: She is oriented to person, place, and time. She appears well-developed. No distress.   HENT:   Head: Normocephalic and atraumatic.   Neck: Neck supple. No tracheal deviation present.   Cardiovascular: Normal rate, regular rhythm and intact distal pulses.   Murmur heard.   Systolic murmur is present.  Pulmonary/Chest: She has  decreased breath sounds in the right lower field. She has wheezes (mild diffuse wheezes). She has rales (bibasilar).   On 3.5L nasal cannula, with SpO2 96%   Abdominal: Soft. She exhibits no distension.   Musculoskeletal: She exhibits edema (trace, bilateral lower extremities). She exhibits no deformity.   Neurological: She is alert and oriented to person, place, and time.   Skin: Skin is warm and dry.   Nursing note and vitals reviewed.      Significant Labs:   CBC:   Recent Labs   Lab 02/25/19  0345 02/26/19  0544   WBC 4.07 5.26   HGB 7.4* 6.9*   HCT 24.1* 23.4*    238     CMP:   Recent Labs   Lab 02/25/19 0345 02/26/19  0544    137   K 4.4 4.4    105   CO2 24 24    125*   BUN 31* 28*   CREATININE 1.0 0.8   CALCIUM 9.1 8.9   PROT 6.1 5.7*   ALBUMIN 2.5* 2.4*   BILITOT 0.3 0.3   ALKPHOS 75 80   AST 16 13   ALT 16 14   ANIONGAP 9 8   EGFRNONAA 53.7* >60.0       Significant Imaging: I have reviewed all pertinent imaging results/findings within the past 24 hours.    Assessment/Plan:      * Acute on chronic respiratory failure    - Patient acute respiratory failure is multifactorial secondary to: moderate COPD, pulmonary HTN (PA 49), HOCM, PE, MARISOL (CPAP at 4), bilateral pulmonary effusion, hx of RUL cancer s/p chemo&XRT, recently tx for CAP zosyn x 12 days   - treated for PNA at OSH, no signs of infection. No further txt required   - per OSH records patient not tolerating CPAP at night, would get hypoxic and was switched over to BiPAP  - pulmonary consulted; appreciate assistance but do not feel there is anything else to offer her inpatient    Plan  - will continue BiPAP qhs  - wean O2 as able  -on discharge will need to have follow up with pulm          Acute blood loss anemia    - Patient w/ a history of ITP, bleeding diathesis, daughter with hemophilia B (implying pt is carrier), hx of bleeding.   - has been evaluated by hem/onc as outpatient and all workup was normal   - s/p 1 unit  PRBCs  - hemoglobin 6.9 this a.m.  - EGD  2/25 showed a Normal esophagus, stomach and duodenum.    Plan:  -will transfuse 1u of pRBC  - Use a proton pump inhibitor PO daily prophylactically since patient will be on Xarelto  -will continue to monitor patient closely given her slight drop on xarelto       Hypothyroidism    -patient has reported hypothyroidism    Plan  - continue home dose synthroid   -will obtain a TSH tomorrow AM with labs      Pulmonary HTN    - this is a chronic issue that is most likely exacerbating her SOB  - pulmonary artery systolic pressure (PASP)= 49  - Mean PAP can be approximated because mPAP = 0.61sPAP + 2  - Mean PAP= 31.89 (32)       HOCM (hypertrophic obstructive cardiomyopathy)    - Hypertrophic cardiomyopathy with asymmetric septal hypertrophy  - Systolic anterior motion of the mitral valve with severe outflow tract left ventricular obstruction. The peak gradient is near 100mm Hg.    Plan:  - continue BB and CCB  - uptitrate medications as tolerated   - cards following, appreciate recs   - will need outpatient interventional cardiology follow-up for ablation        Type 2 diabetes mellitus    -on Metformin at home and has great control (A1c was 5.6 on 2/19/19)  -patient has not required any SSI in last 48hrs  -BG remains in goal range    Plan  - Low dose insulin sliding scale   - bedside glucose monitoring         Pleural effusion, bilateral    - Thoracentesis 2/8 consistent w/ transudate (OSH studies)  - Thoracentesis 12/2018 cytology negative for malignancy  - Bilateral effusions notes on CXR        Chronic pulmonary embolism    - Hx of RML embolus diagnosed 12/2018  - h/h down trending 6.9 (8.5 on admission)  - discontinued lovenox   - discussed with pulmonology and recommend starting on heparin drip to see if she tolerates, and then started on Xarelto this AM as patient did not tolerate Eliquis    Plan:  -discussed dosing with pharmacy and switched to 20mg 1x/day  - need to  monitor in-house for bleeding       Acute on chronic diastolic congestive heart failure, NYHA class 3      Echo 2/19/2019  · Hypertrophic cardiomyopathy with asymmetric septal hypertrophy  · Normal left ventricular systolic function. The estimated ejection fraction is 65%  · Normal right ventricular systolic function.  · Severe left atrial enlargement.  · Systolic anterior motion of the mitral valve with severe outflow tract left ventricular obstruction. The peak gradient is near 100mm Hg.  · The estimated PA systolic pressure is 49 mm Hg  · Intermediate central venous pressure (8 mm Hg).     Plan:  - will hold off on diuresis; pt is preload dependent   - continue metoprolol 50 mg BID and Verapamil 80 daily       COPD (chronic obstructive pulmonary disease)    - continue LAMA/ICS/LABA  - duonebs q6 hrs     Plan:  -appears at baseline  -will continue to monitor closely and will d/c with follow up with Pulmonology         VTE Risk Mitigation (From admission, onward)        Ordered     rivaroxaban tablet 20 mg  With dinner      02/26/19 0906              Cristy Verdin MD  Department of Hospital Medicine   [facility]

## 2019-02-26 NOTE — ASSESSMENT & PLAN NOTE
- Hx of RML embolus diagnosed 12/2018  - h/h down trending 6.9 (8.5 on admission)  - discontinued lovenox   - discussed with pulmonology and recommend starting on heparin drip to see if she tolerates, and then started on Xarelto this AM as patient did not tolerate Eliquis    Plan:  -discussed dosing with pharmacy and switched to 20mg 1x/day  - need to monitor in-house for bleeding

## 2019-02-27 LAB
ALBUMIN SERPL BCP-MCNC: 2.6 G/DL
ALP SERPL-CCNC: 87 U/L
ALT SERPL W/O P-5'-P-CCNC: 17 U/L
ANION GAP SERPL CALC-SCNC: 8 MMOL/L
APTT BLDCRRT: 28.3 SEC
APTT BLDCRRT: 35.4 SEC
APTT BLDCRRT: 49.4 SEC
AST SERPL-CCNC: 15 U/L
BASOPHILS # BLD AUTO: 0.05 K/UL
BASOPHILS # BLD AUTO: 0.05 K/UL
BASOPHILS # BLD AUTO: 0.07 K/UL
BASOPHILS NFR BLD: 0.6 %
BASOPHILS NFR BLD: 0.6 %
BASOPHILS NFR BLD: 1 %
BILIRUB SERPL-MCNC: 0.6 MG/DL
BUN SERPL-MCNC: 24 MG/DL
CALCIUM SERPL-MCNC: 9.5 MG/DL
CHLORIDE SERPL-SCNC: 105 MMOL/L
CO2 SERPL-SCNC: 25 MMOL/L
CREAT SERPL-MCNC: 0.9 MG/DL
DIFFERENTIAL METHOD: ABNORMAL
EOSINOPHIL # BLD AUTO: 0.4 K/UL
EOSINOPHIL NFR BLD: 4.7 %
EOSINOPHIL NFR BLD: 4.7 %
EOSINOPHIL NFR BLD: 5.1 %
ERYTHROCYTE [DISTWIDTH] IN BLOOD BY AUTOMATED COUNT: 15.5 %
ERYTHROCYTE [DISTWIDTH] IN BLOOD BY AUTOMATED COUNT: 15.7 %
ERYTHROCYTE [DISTWIDTH] IN BLOOD BY AUTOMATED COUNT: 15.7 %
EST. GFR  (AFRICAN AMERICAN): >60 ML/MIN/1.73 M^2
EST. GFR  (NON AFRICAN AMERICAN): >60 ML/MIN/1.73 M^2
GLUCOSE SERPL-MCNC: 110 MG/DL
HCT VFR BLD AUTO: 25.9 %
HCT VFR BLD AUTO: 25.9 %
HCT VFR BLD AUTO: 26.2 %
HGB BLD-MCNC: 8 G/DL
HGB BLD-MCNC: 8 G/DL
HGB BLD-MCNC: 8.1 G/DL
IMM GRANULOCYTES # BLD AUTO: 0.05 K/UL
IMM GRANULOCYTES # BLD AUTO: 0.09 K/UL
IMM GRANULOCYTES # BLD AUTO: 0.09 K/UL
IMM GRANULOCYTES NFR BLD AUTO: 0.7 %
IMM GRANULOCYTES NFR BLD AUTO: 1.1 %
IMM GRANULOCYTES NFR BLD AUTO: 1.1 %
INR PPP: 0.9
LYMPHOCYTES # BLD AUTO: 1 K/UL
LYMPHOCYTES # BLD AUTO: 1.1 K/UL
LYMPHOCYTES # BLD AUTO: 1.1 K/UL
LYMPHOCYTES NFR BLD: 13.1 %
LYMPHOCYTES NFR BLD: 13.1 %
LYMPHOCYTES NFR BLD: 14.2 %
MAGNESIUM SERPL-MCNC: 1.9 MG/DL
MCH RBC QN AUTO: 28.6 PG
MCHC RBC AUTO-ENTMCNC: 30.9 G/DL
MCV RBC AUTO: 93 FL
MONOCYTES # BLD AUTO: 0.7 K/UL
MONOCYTES NFR BLD: 10.4 %
MONOCYTES NFR BLD: 8.8 %
MONOCYTES NFR BLD: 8.8 %
NEUTROPHILS # BLD AUTO: 4.7 K/UL
NEUTROPHILS # BLD AUTO: 5.9 K/UL
NEUTROPHILS # BLD AUTO: 5.9 K/UL
NEUTROPHILS NFR BLD: 68.6 %
NEUTROPHILS NFR BLD: 71.7 %
NEUTROPHILS NFR BLD: 71.7 %
NRBC BLD-RTO: 0 /100 WBC
OB PNL STL: POSITIVE
PLATELET # BLD AUTO: 260 K/UL
PLATELET # BLD AUTO: 274 K/UL
PLATELET # BLD AUTO: 274 K/UL
PMV BLD AUTO: 10.2 FL
PMV BLD AUTO: 10.2 FL
PMV BLD AUTO: 10.7 FL
POCT GLUCOSE: 138 MG/DL (ref 70–110)
POCT GLUCOSE: 145 MG/DL (ref 70–110)
POCT GLUCOSE: 178 MG/DL (ref 70–110)
POCT GLUCOSE: 245 MG/DL (ref 70–110)
POTASSIUM SERPL-SCNC: 4.4 MMOL/L
PROT SERPL-MCNC: 6.2 G/DL
PROTHROMBIN TIME: 9.9 SEC
RBC # BLD AUTO: 2.8 M/UL
RBC # BLD AUTO: 2.8 M/UL
RBC # BLD AUTO: 2.83 M/UL
SODIUM SERPL-SCNC: 138 MMOL/L
TSH SERPL DL<=0.005 MIU/L-ACNC: 2.98 UIU/ML
WBC # BLD AUTO: 6.83 K/UL
WBC # BLD AUTO: 8.16 K/UL
WBC # BLD AUTO: 8.16 K/UL

## 2019-02-27 PROCEDURE — 85730 THROMBOPLASTIN TIME PARTIAL: CPT | Mod: 91

## 2019-02-27 PROCEDURE — 84443 ASSAY THYROID STIM HORMONE: CPT

## 2019-02-27 PROCEDURE — 63600175 PHARM REV CODE 636 W HCPCS: Performed by: HOSPITALIST

## 2019-02-27 PROCEDURE — 25000003 PHARM REV CODE 250: Performed by: INTERNAL MEDICINE

## 2019-02-27 PROCEDURE — 94761 N-INVAS EAR/PLS OXIMETRY MLT: CPT

## 2019-02-27 PROCEDURE — 99900035 HC TECH TIME PER 15 MIN (STAT)

## 2019-02-27 PROCEDURE — 83735 ASSAY OF MAGNESIUM: CPT

## 2019-02-27 PROCEDURE — 99233 SBSQ HOSP IP/OBS HIGH 50: CPT | Mod: ,,, | Performed by: HOSPITALIST

## 2019-02-27 PROCEDURE — 25000003 PHARM REV CODE 250: Performed by: STUDENT IN AN ORGANIZED HEALTH CARE EDUCATION/TRAINING PROGRAM

## 2019-02-27 PROCEDURE — 85610 PROTHROMBIN TIME: CPT

## 2019-02-27 PROCEDURE — 94640 AIRWAY INHALATION TREATMENT: CPT

## 2019-02-27 PROCEDURE — 85730 THROMBOPLASTIN TIME PARTIAL: CPT

## 2019-02-27 PROCEDURE — 25000242 PHARM REV CODE 250 ALT 637 W/ HCPCS: Performed by: STUDENT IN AN ORGANIZED HEALTH CARE EDUCATION/TRAINING PROGRAM

## 2019-02-27 PROCEDURE — 94799 UNLISTED PULMONARY SVC/PX: CPT

## 2019-02-27 PROCEDURE — 99233 PR SUBSEQUENT HOSPITAL CARE,LEVL III: ICD-10-PCS | Mod: ,,, | Performed by: HOSPITALIST

## 2019-02-27 PROCEDURE — 80053 COMPREHEN METABOLIC PANEL: CPT

## 2019-02-27 PROCEDURE — 11000001 HC ACUTE MED/SURG PRIVATE ROOM

## 2019-02-27 PROCEDURE — 63600175 PHARM REV CODE 636 W HCPCS: Performed by: PHYSICIAN ASSISTANT

## 2019-02-27 PROCEDURE — 27000221 HC OXYGEN, UP TO 24 HOURS

## 2019-02-27 PROCEDURE — 25000242 PHARM REV CODE 250 ALT 637 W/ HCPCS: Performed by: INTERNAL MEDICINE

## 2019-02-27 PROCEDURE — 25000003 PHARM REV CODE 250: Performed by: HOSPITALIST

## 2019-02-27 PROCEDURE — 85025 COMPLETE CBC W/AUTO DIFF WBC: CPT | Mod: 91

## 2019-02-27 PROCEDURE — 97110 THERAPEUTIC EXERCISES: CPT

## 2019-02-27 PROCEDURE — 63600175 PHARM REV CODE 636 W HCPCS: Performed by: STUDENT IN AN ORGANIZED HEALTH CARE EDUCATION/TRAINING PROGRAM

## 2019-02-27 PROCEDURE — 94660 CPAP INITIATION&MGMT: CPT

## 2019-02-27 RX ORDER — FUROSEMIDE 10 MG/ML
20 INJECTION INTRAMUSCULAR; INTRAVENOUS ONCE
Status: COMPLETED | OUTPATIENT
Start: 2019-02-28 | End: 2019-02-28

## 2019-02-27 RX ORDER — MORPHINE SULFATE 2 MG/ML
2 INJECTION, SOLUTION INTRAMUSCULAR; INTRAVENOUS ONCE
Status: COMPLETED | OUTPATIENT
Start: 2019-02-27 | End: 2019-02-27

## 2019-02-27 RX ORDER — METOPROLOL SUCCINATE 100 MG/1
100 TABLET, EXTENDED RELEASE ORAL DAILY
Status: DISCONTINUED | OUTPATIENT
Start: 2019-02-28 | End: 2019-03-08 | Stop reason: HOSPADM

## 2019-02-27 RX ORDER — HEPARIN SODIUM,PORCINE/D5W 25000/250
12 INTRAVENOUS SOLUTION INTRAVENOUS CONTINUOUS
Status: DISCONTINUED | OUTPATIENT
Start: 2019-02-27 | End: 2019-03-03

## 2019-02-27 RX ORDER — VERAPAMIL HYDROCHLORIDE 240 MG/1
240 TABLET, FILM COATED, EXTENDED RELEASE ORAL NIGHTLY
Status: DISCONTINUED | OUTPATIENT
Start: 2019-02-28 | End: 2019-03-08 | Stop reason: HOSPADM

## 2019-02-27 RX ADMIN — GABAPENTIN 100 MG: 100 CAPSULE ORAL at 08:02

## 2019-02-27 RX ADMIN — HYDROCODONE BITARTRATE AND ACETAMINOPHEN 1 TABLET: 5; 325 TABLET ORAL at 08:02

## 2019-02-27 RX ADMIN — IPRATROPIUM BROMIDE AND ALBUTEROL SULFATE 3 ML: .5; 3 SOLUTION RESPIRATORY (INHALATION) at 12:02

## 2019-02-27 RX ADMIN — IPRATROPIUM BROMIDE AND ALBUTEROL SULFATE 3 ML: .5; 3 SOLUTION RESPIRATORY (INHALATION) at 07:02

## 2019-02-27 RX ADMIN — ACETAMINOPHEN 650 MG: 325 TABLET ORAL at 06:02

## 2019-02-27 RX ADMIN — TIOTROPIUM BROMIDE 18 MCG: 18 CAPSULE ORAL; RESPIRATORY (INHALATION) at 08:02

## 2019-02-27 RX ADMIN — METOPROLOL TARTRATE 50 MG: 50 TABLET ORAL at 08:02

## 2019-02-27 RX ADMIN — VERAPAMIL HYDROCHLORIDE 80 MG: 80 TABLET, FILM COATED ORAL at 08:02

## 2019-02-27 RX ADMIN — HYDROCODONE BITARTRATE AND ACETAMINOPHEN 1 TABLET: 5; 325 TABLET ORAL at 09:02

## 2019-02-27 RX ADMIN — HEPARIN SODIUM AND DEXTROSE 17 UNITS/KG/HR: 10000; 5 INJECTION INTRAVENOUS at 10:02

## 2019-02-27 RX ADMIN — LEVOTHYROXINE SODIUM 112 MCG: 112 TABLET ORAL at 06:02

## 2019-02-27 RX ADMIN — DOCUSATE SODIUM 100 MG: 100 CAPSULE, LIQUID FILLED ORAL at 08:02

## 2019-02-27 RX ADMIN — METOPROLOL TARTRATE 50 MG: 50 TABLET ORAL at 09:02

## 2019-02-27 RX ADMIN — IPRATROPIUM BROMIDE AND ALBUTEROL SULFATE 3 ML: .5; 3 SOLUTION RESPIRATORY (INHALATION) at 11:02

## 2019-02-27 RX ADMIN — HEPARIN SODIUM AND DEXTROSE 17 UNITS/KG/HR: 10000; 5 INJECTION INTRAVENOUS at 03:02

## 2019-02-27 RX ADMIN — VERAPAMIL HYDROCHLORIDE 80 MG: 80 TABLET, FILM COATED ORAL at 03:02

## 2019-02-27 RX ADMIN — VERAPAMIL HYDROCHLORIDE 80 MG: 80 TABLET, FILM COATED ORAL at 09:02

## 2019-02-27 RX ADMIN — INSULIN ASPART 2 UNITS: 100 INJECTION, SOLUTION INTRAVENOUS; SUBCUTANEOUS at 12:02

## 2019-02-27 RX ADMIN — FLUTICASONE PROPIONATE 50 MCG: 50 SPRAY, METERED NASAL at 08:02

## 2019-02-27 RX ADMIN — MORPHINE SULFATE 2 MG: 2 INJECTION, SOLUTION INTRAMUSCULAR; INTRAVENOUS at 06:02

## 2019-02-27 RX ADMIN — PANTOPRAZOLE SODIUM 40 MG: 40 TABLET, DELAYED RELEASE ORAL at 08:02

## 2019-02-27 RX ADMIN — GABAPENTIN 100 MG: 100 CAPSULE ORAL at 09:02

## 2019-02-27 RX ADMIN — MONTELUKAST SODIUM 10 MG: 10 TABLET, FILM COATED ORAL at 09:02

## 2019-02-27 RX ADMIN — ACETYLCYSTEINE 2 ML: 100 SOLUTION ORAL; RESPIRATORY (INHALATION) at 11:02

## 2019-02-27 RX ADMIN — ACETYLCYSTEINE 4 ML: 100 SOLUTION ORAL; RESPIRATORY (INHALATION) at 07:02

## 2019-02-27 RX ADMIN — ALPRAZOLAM 0.5 MG: 0.5 TABLET ORAL at 10:02

## 2019-02-27 RX ADMIN — IPRATROPIUM BROMIDE AND ALBUTEROL SULFATE 3 ML: .5; 3 SOLUTION RESPIRATORY (INHALATION) at 10:02

## 2019-02-27 NOTE — SUBJECTIVE & OBJECTIVE
Past Medical History:   Diagnosis Date    Anemia     Anxiety     Arthritis     Asthma     Atrial fibrillation     Cancer     Cataract     Clotting disorder     COPD (chronic obstructive pulmonary disease)     Coronary artery disease     Degenerative disc disease at L5-S1 level     Depression     Glaucoma     Heart murmur     History of stomach ulcers     Hypertension     Myocardial infarction     Neuromuscular disorder     Thyroid disease     Tuberculosis        Past Surgical History:   Procedure Laterality Date    CHOLECYSTECTOMY      COLON SURGERY      EGD (ESOPHAGOGASTRODUODENOSCOPY) N/A 2/25/2019    Performed by Oscar Johnson MD at [facility] ([address] [place] [address])    EYE SURGERY      TONSILLECTOMY         Review of patient's allergies indicates:   Allergen Reactions    Augmentin [amoxicillin-pot clavulanate]     Cosopt [dorzolamide-timolol]     Cymbalta [duloxetine]     Isordil [isosorbide dinitrate]     Procardia [nifedipine]        PTA Medications   Medication Sig    albuterol 90 mcg/actuation inhaler Inhale 2 puffs into the lungs every 6 (six) hours as needed for Wheezing.    albuterol-ipratropium 2.5mg-0.5mg/3mL (DUO-NEB) 0.5 mg-3 mg(2.5 mg base)/3 mL nebulizer solution Take 3 mLs by nebulization every 6 (six) hours as needed for Wheezing.    ALPRAZolam (XANAX) 0.5 MG tablet Take 0.5 mg by mouth nightly as needed for Insomnia or Anxiety (SOB/anxiety/rest at night).    azilsartan medoxomil 80 mg Tab Take by mouth.    furosemide (LASIX) 40 MG tablet Take 40 mg by mouth 2 (two) times daily.    gabapentin (NEURONTIN) 100 MG capsule Take 100 mg by mouth 2 (two) times daily.    hydrocodone-acetaminophen 10-325mg (NORCO)  mg Tab Take by mouth every 6 (six) hours.    iron-vitamin C 100-250 mg, ICAR-C, 100-250 mg Tab Take by mouth.    levothyroxine (SYNTHROID) 112 MCG tablet Take 112 mcg by mouth once daily.    lidocaine (LIDODERM) 5 %(700 mg/patch) Place 1 patch onto the skin every 24  hours. Remove & Discard patch within 12 hours or as directed by MD    metformin (GLUCOPHAGE) 1000 MG tablet Take 1,000 mg by mouth 2 (two) times daily with meals.    metoprolol tartrate (LOPRESSOR) 50 MG tablet Take 50 mg by mouth 2 (two) times daily.    montelukast (SINGULAIR) 10 mg tablet Take 10 mg by mouth every evening.    potassium chloride (KLOR-CON) 20 mEq Pack Take 20 mEq by mouth 2 (two) times daily.    senna-docusate 8.6-50 mg (PERICOLACE) 8.6-50 mg per tablet Take 1 tablet by mouth once daily.    chlorthalidone (HYGROTEN) 25 MG Tab Take 25 mg by mouth once daily.    diltiazem (CARDIZEM) 30 MG tablet Take 30 mg by mouth 4 (four) times daily.    doxycycline (VIBRA-TABS) 100 MG tablet Take 100 mg by mouth 2 (two) times daily.    fluticasone (FLONASE) 50 mcg/actuation nasal spray 1 spray by Each Nare route once daily.    venlafaxine (EFFEXOR-XR) 37.5 MG 24 hr capsule Take 37.5 mg by mouth once daily.     Family History     None        Tobacco Use    Smoking status: Former Smoker     Last attempt to quit: 1/14/2016     Years since quitting: 3.1   Substance and Sexual Activity    Alcohol use: No     Alcohol/week: 0.0 oz    Drug use: No    Sexual activity: Not on file     Review of Systems   Constitution: Negative for chills, diaphoresis, fever, weakness, weight gain and weight loss.   HENT: Negative for sore throat.    Eyes: Negative for blurred vision, vision loss in left eye, vision loss in right eye and visual disturbance.   Cardiovascular: Positive for dyspnea on exertion, leg swelling and orthopnea. Negative for chest pain, claudication, near-syncope, palpitations, paroxysmal nocturnal dyspnea and syncope.   Respiratory: Negative for cough, hemoptysis, shortness of breath, sputum production and wheezing.    Endocrine: Negative for cold intolerance and heat intolerance.   Hematologic/Lymphatic: Negative for adenopathy. Does not bruise/bleed easily.   Skin: Negative for rash.    Musculoskeletal: Negative for falls, muscle weakness and myalgias.   Gastrointestinal: Negative for abdominal pain, change in bowel habit, constipation, diarrhea, melena and nausea.   Genitourinary: Negative for bladder incontinence.   Neurological: Negative for dizziness, focal weakness, headaches, light-headedness and numbness.   Psychiatric/Behavioral: Negative for altered mental status.     Objective:     Vital Signs (Most Recent):  Temp: 97.2 °F (36.2 °C) (02/27/19 0757)  Pulse: 65 (02/27/19 1131)  Resp: 17 (02/27/19 1131)  BP: 118/61 (02/27/19 0757)  SpO2: 100 % (02/27/19 1131) Vital Signs (24h Range):  Temp:  [97.2 °F (36.2 °C)-98.7 °F (37.1 °C)] 97.2 °F (36.2 °C)  Pulse:  [65-99] 65  Resp:  [16-22] 17  SpO2:  [92 %-100 %] 100 %  BP: (107-142)/(56-74) 118/61     Weight: 90.1 kg (198 lb 10.2 oz)  Body mass index is 33.05 kg/m².    SpO2: 100 %  O2 Device (Oxygen Therapy): nasal cannula      Intake/Output Summary (Last 24 hours) at 2/27/2019 1422  Last data filed at 2/27/2019 0600  Gross per 24 hour   Intake 356.67 ml   Output 950 ml   Net -593.33 ml       Lines/Drains/Airways     Peripherally Inserted Central Catheter Line                 PICC Double Lumen -- days          Drain            Female External Urinary Catheter 02/21/19 0800 6 days                Physical Exam   Constitutional: She is oriented to person, place, and time. She appears well-developed and well-nourished. No distress.   HENT:   Head: Normocephalic and atraumatic.   Mouth/Throat: Oropharynx is clear and moist.   Eyes: Conjunctivae and EOM are normal. Pupils are equal, round, and reactive to light. No scleral icterus.   Neck: Neck supple. No JVD present. No tracheal deviation present.   Cardiovascular: Normal rate and regular rhythm. Exam reveals no gallop and no friction rub.   Murmur heard.  Pulmonary/Chest: Effort normal and breath sounds normal. No respiratory distress. She has no wheezes. She has no rales. She exhibits no tenderness.    Abdominal: Soft. Bowel sounds are normal. She exhibits no distension. There is no hepatosplenomegaly. There is no tenderness.   Musculoskeletal: She exhibits no edema or tenderness.   Neurological: She is alert and oriented to person, place, and time.   Skin: Skin is warm and dry. No rash noted. No erythema.   Psychiatric: She has a normal mood and affect. Her behavior is normal.       Significant Labs:   BMP:   Recent Labs   Lab 02/26/19  0544 02/27/19  0552   * 110    138   K 4.4 4.4    105   CO2 24 25   BUN 28* 24*   CREATININE 0.8 0.9   CALCIUM 8.9 9.5   MG 1.8 1.9   , CBC   Recent Labs   Lab 02/26/19 0544 02/26/19 2036 02/27/19  0552   WBC 5.26 9.75  9.75  9.75 6.83   HGB 6.9* 8.3*  8.3*  8.3* 8.1*   HCT 23.4* 26.8*  26.8*  26.8* 26.2*    255  255  255 260    and INR   Recent Labs   Lab 02/26/19  1830   INR 1.0       Significant Imaging:   Echo (2/21/19):  · Normal left ventricular systolic function. The estimated ejection fraction is 65%  · Concentric left ventricular remodeling.  · Biatrial enlargement.  · Grade II (moderate) left ventricular diastolic dysfunction consistent with pseudonormalization.  · Elevated left atrial pressure.  · Normal right ventricular systolic function.  · Moderate mitral regurgitation.  · The estimated PA systolic pressure is 44 mm Hg  · Pulmonary hypertension present.  · Normal central venous pressure (3 mm Hg).  · ROSAURA with dynamic LVOT gradient 100 mmHg

## 2019-02-27 NOTE — ASSESSMENT & PLAN NOTE
- Patient acute respiratory failure is multifactorial secondary to: moderate COPD, pulmonary HTN (PA 49), HOCM, PE, MARISOL (CPAP at 4), bilateral pulmonary effusion, hx of RUL cancer s/p chemo&XRT, recently tx for CAP zosyn x 12 days   - treated for PNA at OSH, no signs of infection. No further txt required   - per OSH records patient not tolerating CPAP at night, would get hypoxic and was switched over to BiPAP  - pulmonary consulted; appreciate assistance but do not feel there is anything else to offer her inpatient    Plan  - will continue BiPAP qhs  -patient will need BiPAP on discharge (order placed)  - wean O2 as able  -on discharge will need to have follow up with pulm

## 2019-02-27 NOTE — ASSESSMENT & PLAN NOTE
- Hx of RML embolus diagnosed 12/2018  - h/h down trending 6.9 (8.5 on admission)  - discontinued lovenox   - discussed with pulmonology and recommend starting on heparin drip to see if she tolerates, and then started on Xarelto this AM as patient did not tolerate Eliquis  -patient currently on a heparin ggt as she had a drop of her hg on xarelto    Plan:  -continue inpatient low dose heparin ggt  -discussed at length with patient and her daughter about the risks and benefits of anticoagulation (no anticoagulation vs warfaring vs xarelto).  They will think about it and decide

## 2019-02-27 NOTE — SUBJECTIVE & OBJECTIVE
Interval History: NAEON. Patient had episode of dyspnea overnight with sats remaining >88. On Heparin ggt for bleeding with adequate hg trends. LE US showed no DVTs.      Review of Systems   Constitutional: Positive for appetite change and fatigue. Negative for chills and fever.   HENT: Negative for sore throat and trouble swallowing.    Eyes: Negative for pain and visual disturbance.   Respiratory: Positive for cough (dry), chest tightness and shortness of breath (on exertion).    Cardiovascular: Negative for chest pain and palpitations.   Gastrointestinal: Negative for abdominal pain, blood in stool and nausea.   Genitourinary: Negative for difficulty urinating and dysuria.   Musculoskeletal: Negative for arthralgias and gait problem.   Neurological: Negative for dizziness and headaches.   Psychiatric/Behavioral: Negative for confusion. The patient is not nervous/anxious.      Objective:     Vital Signs (Most Recent):  Temp: 97.2 °F (36.2 °C) (02/27/19 0757)  Pulse: 65 (02/27/19 1131)  Resp: 17 (02/27/19 1131)  BP: 118/61 (02/27/19 0757)  SpO2: 100 % (02/27/19 1131) Vital Signs (24h Range):  Temp:  [97.2 °F (36.2 °C)-98.7 °F (37.1 °C)] 97.2 °F (36.2 °C)  Pulse:  [61-99] 65  Resp:  [16-22] 17  SpO2:  [92 %-100 %] 100 %  BP: (107-142)/(56-74) 118/61     Weight: 90.1 kg (198 lb 10.2 oz)  Body mass index is 33.05 kg/m².    Intake/Output Summary (Last 24 hours) at 2/27/2019 1310  Last data filed at 2/27/2019 0600  Gross per 24 hour   Intake 356.67 ml   Output 950 ml   Net -593.33 ml      Physical Exam   Constitutional: She is oriented to person, place, and time. She appears well-developed. No distress.   HENT:   Head: Normocephalic and atraumatic.   Neck: Neck supple. No tracheal deviation present.   Cardiovascular: Normal rate, regular rhythm and intact distal pulses.   Murmur heard.   Systolic murmur is present.  Pulmonary/Chest: She has decreased breath sounds in the right lower field. She has wheezes (mild diffuse  wheezes). She has rales (bibasilar).   On 3.5L nasal cannula, with SpO2 96%   Abdominal: Soft. She exhibits no distension.   Musculoskeletal: She exhibits edema (trace, bilateral lower extremities). She exhibits no deformity.   Neurological: She is alert and oriented to person, place, and time.   Skin: Skin is warm and dry.   Nursing note and vitals reviewed.      Significant Labs:   CBC:   Recent Labs   Lab 02/26/19 0544 02/26/19 2036 02/27/19  0552   WBC 5.26 9.75  9.75  9.75 6.83   HGB 6.9* 8.3*  8.3*  8.3* 8.1*   HCT 23.4* 26.8*  26.8*  26.8* 26.2*    255  255  255 260     CMP:   Recent Labs   Lab 02/26/19 0544 02/27/19  0552    138   K 4.4 4.4    105   CO2 24 25   * 110   BUN 28* 24*   CREATININE 0.8 0.9   CALCIUM 8.9 9.5   PROT 5.7* 6.2   ALBUMIN 2.4* 2.6*   BILITOT 0.3 0.6   ALKPHOS 80 87   AST 13 15   ALT 14 17   ANIONGAP 8 8   EGFRNONAA >60.0 >60.0       Significant Imaging: I have reviewed all pertinent imaging results/findings within the past 24 hours.

## 2019-02-27 NOTE — HPI
Ms. 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 She presented on 2/5/29 via EMS for respiratory distress despite supplemental oxygen. Pt had noted significantly worsening SOB over the last 4-5 days. Patient was placed on BiPaP w/ improvement in her respiratory function. CTA chest (2/7/19) showed no evidence of PE, moderate bilateral pleural effusions, RML consolidation. Thoracentesis was performed on 2/9/19, consistent with transudate. Patient was treated w/ antibiotics. Despite initiation of b-blocker and verapamil, she continued to have class IV sx, so she was transferred to [facility] for high level of care. Currently, she is on 4L NC, though she continues to have 90 degree orthopnea. Her abdomen is distended. CXR shows persistent bilateral pleural effusions, R>L.     She was previously diagnosed with subaortic stenosis by echo in December 2018 and Dr. Louis recommended she see Dr. Jamison for further evaluation. Her echo on 2/21 shows::  ·    Normal left ventricular systolic function. The estimated ejection fraction is 65%  · Concentric left ventricular remodeling.  · Biatrial enlargement.  · Grade II (moderate) left ventricular diastolic dysfunction consistent with pseudonormalization.  · Elevated left atrial pressure.  · Normal right ventricular systolic function.  · Moderate mitral regurgitation.  · The estimated PA systolic pressure is 44 mm Hg  · Pulmonary hypertension present.  · Normal central venous pressure (3 mm Hg).  · ROSAURA with dynamic LVOT gradient 100 mmHg

## 2019-02-27 NOTE — CONSULTS
[facility]  Interventional Cardiology  Consult Note    Patient Name: Makenzie Leija  MRN: [id number]  Admission Date: 2/19/2019  Hospital Length of Stay: 8 days  Code Status: Full Code   Attending Provider: Lena Jamison MD  Consulting Provider: Bridgette Calderon PA-C  Primary Care Physician: Karthik Roth MD  Principal Problem:Acute on chronic respiratory failure    Consults  Subjective:       HPI:  Ms. 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 She presented on 2/5/29 via EMS for respiratory distress despite supplemental oxygen. Pt had noted significantly worsening SOB over the last 4-5 days. Patient was placed on BiPaP w/ improvement in her respiratory function. CTA chest (2/7/19) showed no evidence of PE, moderate bilateral pleural effusions, RML consolidation. Thoracentesis was performed on 2/9/19, consistent with transudate. Patient was treated w/ antibiotics. Despite initiation of b-blocker and verapamil, she continued to have class IV sx, so she was transferred to [facility] for high level of care. Currently, she is on 4L NC, though she continues to have 90 degree orthopnea. Her abdomen is distended. CXR shows persistent bilateral pleural effusions, R>L.     She was previously diagnosed with subaortic stenosis by echo in December 2018 and Dr. Louis recommended she see Dr. Jamison for further evaluation. Her echo on 2/21 shows::  ·    Normal left ventricular systolic function. The estimated ejection fraction is 65%  · Concentric left ventricular remodeling.  · Biatrial enlargement.  · Grade II (moderate) left ventricular  diastolic dysfunction consistent with pseudonormalization.  · Elevated left atrial pressure.  · Normal right ventricular systolic function.  · Moderate mitral regurgitation.  · The estimated PA systolic pressure is 44 mm Hg  · Pulmonary hypertension present.  · Normal central venous pressure (3 mm Hg).  ROSAURA with dynamic LVOT gradient 100 mmHg    Past Medical History:   Diagnosis Date    Anemia     Anxiety     Arthritis     Asthma     Atrial fibrillation     Cancer     Cataract     Clotting disorder     COPD (chronic obstructive pulmonary disease)     Coronary artery disease     Degenerative disc disease at L5-S1 level     Depression     Glaucoma     Heart murmur     History of stomach ulcers     Hypertension     Myocardial infarction     Neuromuscular disorder     Thyroid disease     Tuberculosis        Past Surgical History:   Procedure Laterality Date    CHOLECYSTECTOMY      COLON SURGERY      EGD (ESOPHAGOGASTRODUODENOSCOPY) N/A 2/25/2019    Performed by Oscar Johnson MD at [facility] ([address] [place] [address])    EYE SURGERY      TONSILLECTOMY         Review of patient's allergies indicates:   Allergen Reactions    Augmentin [amoxicillin-pot clavulanate]     Cosopt [dorzolamide-timolol]     Cymbalta [duloxetine]     Isordil [isosorbide dinitrate]     Procardia [nifedipine]        PTA Medications   Medication Sig    albuterol 90 mcg/actuation inhaler Inhale 2 puffs into the lungs every 6 (six) hours as needed for Wheezing.    albuterol-ipratropium 2.5mg-0.5mg/3mL (DUO-NEB) 0.5 mg-3 mg(2.5 mg base)/3 mL nebulizer solution Take 3 mLs by nebulization every 6 (six) hours as needed for Wheezing.    ALPRAZolam (XANAX) 0.5 MG tablet Take 0.5 mg by mouth nightly as needed for Insomnia or Anxiety (SOB/anxiety/rest at night).    azilsartan medoxomil 80 mg Tab Take by mouth.    furosemide (LASIX) 40 MG tablet Take 40 mg by mouth 2 (two) times daily.    gabapentin (NEURONTIN) 100 MG capsule Take 100 mg by  mouth 2 (two) times daily.    hydrocodone-acetaminophen 10-325mg (NORCO)  mg Tab Take by mouth every 6 (six) hours.    iron-vitamin C 100-250 mg, ICAR-C, 100-250 mg Tab Take by mouth.    levothyroxine (SYNTHROID) 112 MCG tablet Take 112 mcg by mouth once daily.    lidocaine (LIDODERM) 5 %(700 mg/patch) Place 1 patch onto the skin every 24 hours. Remove & Discard patch within 12 hours or as directed by MD    metformin (GLUCOPHAGE) 1000 MG tablet Take 1,000 mg by mouth 2 (two) times daily with meals.    metoprolol tartrate (LOPRESSOR) 50 MG tablet Take 50 mg by mouth 2 (two) times daily.    montelukast (SINGULAIR) 10 mg tablet Take 10 mg by mouth every evening.    potassium chloride (KLOR-CON) 20 mEq Pack Take 20 mEq by mouth 2 (two) times daily.    senna-docusate 8.6-50 mg (PERICOLACE) 8.6-50 mg per tablet Take 1 tablet by mouth once daily.    chlorthalidone (HYGROTEN) 25 MG Tab Take 25 mg by mouth once daily.    diltiazem (CARDIZEM) 30 MG tablet Take 30 mg by mouth 4 (four) times daily.    doxycycline (VIBRA-TABS) 100 MG tablet Take 100 mg by mouth 2 (two) times daily.    fluticasone (FLONASE) 50 mcg/actuation nasal spray 1 spray by Each Nare route once daily.    venlafaxine (EFFEXOR-XR) 37.5 MG 24 hr capsule Take 37.5 mg by mouth once daily.     Family History     None        Tobacco Use    Smoking status: Former Smoker     Last attempt to quit: 1/14/2016     Years since quitting: 3.1   Substance and Sexual Activity    Alcohol use: No     Alcohol/week: 0.0 oz    Drug use: No    Sexual activity: Not on file     Review of Systems   Constitution: Negative for chills, diaphoresis, fever, weakness, weight gain and weight loss.   HENT: Negative for sore throat.    Eyes: Negative for blurred vision, vision loss in left eye, vision loss in right eye and visual disturbance.   Cardiovascular: Positive for dyspnea on exertion, leg swelling and orthopnea. Negative for chest pain, claudication,  near-syncope, palpitations, paroxysmal nocturnal dyspnea and syncope.   Respiratory: Negative for cough, hemoptysis, shortness of breath, sputum production and wheezing.    Endocrine: Negative for cold intolerance and heat intolerance.   Hematologic/Lymphatic: Negative for adenopathy. Does not bruise/bleed easily.   Skin: Negative for rash.   Musculoskeletal: Negative for falls, muscle weakness and myalgias.   Gastrointestinal: Negative for abdominal pain, change in bowel habit, constipation, diarrhea, melena and nausea.   Genitourinary: Negative for bladder incontinence.   Neurological: Negative for dizziness, focal weakness, headaches, light-headedness and numbness.   Psychiatric/Behavioral: Negative for altered mental status.     Objective:     Vital Signs (Most Recent):  Temp: 97.2 °F (36.2 °C) (02/27/19 0757)  Pulse: 65 (02/27/19 1131)  Resp: 17 (02/27/19 1131)  BP: 118/61 (02/27/19 0757)  SpO2: 100 % (02/27/19 1131) Vital Signs (24h Range):  Temp:  [97.2 °F (36.2 °C)-98.7 °F (37.1 °C)] 97.2 °F (36.2 °C)  Pulse:  [65-99] 65  Resp:  [16-22] 17  SpO2:  [92 %-100 %] 100 %  BP: (107-142)/(56-74) 118/61     Weight: 90.1 kg (198 lb 10.2 oz)  Body mass index is 33.05 kg/m².    SpO2: 100 %  O2 Device (Oxygen Therapy): nasal cannula      Intake/Output Summary (Last 24 hours) at 2/27/2019 1422  Last data filed at 2/27/2019 0600  Gross per 24 hour   Intake 356.67 ml   Output 950 ml   Net -593.33 ml       Lines/Drains/Airways     Peripherally Inserted Central Catheter Line                 PICC Double Lumen -- days          Drain            Female External Urinary Catheter 02/21/19 0800 6 days                Physical Exam   Constitutional: She is oriented to person, place, and time. She appears well-developed and well-nourished. No distress.   HENT:   Head: Normocephalic and atraumatic.   Mouth/Throat: Oropharynx is clear and moist.   Eyes: Conjunctivae and EOM are normal. Pupils are equal, round, and reactive to light. No  scleral icterus.   Neck: Neck supple. No JVD present. No tracheal deviation present.   Cardiovascular: Normal rate and regular rhythm. Exam reveals no gallop and no friction rub.   Murmur heard.  Pulmonary/Chest: Effort normal and breath sounds normal. No respiratory distress. She has no wheezes. She has no rales. She exhibits no tenderness.   Abdominal: Soft. Bowel sounds are normal. She exhibits no distension. There is no hepatosplenomegaly. There is no tenderness.   Musculoskeletal: She exhibits no edema or tenderness.   Neurological: She is alert and oriented to person, place, and time.   Skin: Skin is warm and dry. No rash noted. No erythema.   Psychiatric: She has a normal mood and affect. Her behavior is normal.       Significant Labs:   BMP:   Recent Labs   Lab 02/26/19  0544 02/27/19  0552   * 110    138   K 4.4 4.4    105   CO2 24 25   BUN 28* 24*   CREATININE 0.8 0.9   CALCIUM 8.9 9.5   MG 1.8 1.9   , CBC   Recent Labs   Lab 02/26/19  0544 02/26/19 2036 02/27/19  0552   WBC 5.26 9.75  9.75  9.75 6.83   HGB 6.9* 8.3*  8.3*  8.3* 8.1*   HCT 23.4* 26.8*  26.8*  26.8* 26.2*    255  255  255 260    and INR   Recent Labs   Lab 02/26/19  1830   INR 1.0       Significant Imaging:   Echo (2/21/19):  · Normal left ventricular systolic function. The estimated ejection fraction is 65%  · Concentric left ventricular remodeling.  · Biatrial enlargement.  · Grade II (moderate) left ventricular diastolic dysfunction consistent with pseudonormalization.  · Elevated left atrial pressure.  · Normal right ventricular systolic function.  · Moderate mitral regurgitation.  · The estimated PA systolic pressure is 44 mm Hg  · Pulmonary hypertension present.  · Normal central venous pressure (3 mm Hg).  · ROSAURA with dynamic LVOT gradient 100 mmHg    Assessment and Plan:     HOCM (hypertrophic obstructive cardiomyopathy)    Agree with b-blockers and verapamil.   Titrate as tolerated to lower HR and  increase ventricular filling time.   Judicious use of diuretics as patient is preload dependent.   If symptoms persist, despite optimal medical therapy, could consider ETOH ablation.            Thank you for your consult.    Bridgette Calderon PA-C  Interventional Cardiology   [facility]  [phone]

## 2019-02-27 NOTE — ASSESSMENT & PLAN NOTE
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   - has been evaluated by hem/onc as outpatient and all workup was normal   - s/p 1 unit PRBCs  - hemoglobin 6.9 this a.m.  - EGD 2/25 showed a Normal esophagus, stomach and duodenum.    Plan:  - Use a proton pump inhibitor PO daily prophylactically since patient will be on heparin  -will continue to monitor patient with CBC q12h

## 2019-02-27 NOTE — PLAN OF CARE
Problem: Adult Inpatient Plan of Care  Goal: Plan of Care Review  Outcome: Ongoing (interventions implemented as appropriate)  VS and assessment performed per orders. Pain medication given as ordered, moderate relief, morphine 2mg x1 dose given this morning for breakthrough pain. US of bil VÁZQUEZ completed this shift. Heparin drip continues as ordered. Plan of care reviewed with pt, all concerns addressed. Pt free from injury or any falls.

## 2019-02-27 NOTE — PROGRESS NOTES
[facility] Hospital Medicine  Progress Note    Patient Name: Makenzie Leija  MRN: [id number]  Patient Class: IP- Inpatient   Admission Date: 2/19/2019  Length of Stay: 8 days  Attending Physician: Lena Jamison MD  Primary Care Provider: Karthik Roth MD    Hospital Medicine Team: [facility] HOSP MED O Cristy Verdin MD    Subjective:     Principal Problem:Acute on chronic respiratory failure    HPI:  Ms. 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      Per OSH records:  Pt presented on 2/5/29 via EMS for respiratory distress despite supplemental oxygen. Pt reports increased SOB over the last 4-5 days. On the day of admission significant SOB despite inhale and changing from oxygen compressor to tank. SpO2 75% on 3.5 L at home. Triage noted patient's SpO2 69% on 3L NC. Patient was placed on BiPaP w/ improvement in her respiratory function. CTA chest 2/7/19: without evidence of PE; moderate bilateral pleural effusions; RML consolidation. Thoracentesis 2/9/19: consistent with transudate. (thoracentesis 12/18 negative cytology). Patient was treated w/ antibiotics. Echo December 2018, IHSS, elevated peak gradient across LVOT at 179mmHg and a mean of 94mmHg. FELICIA performed. Patient remained in the hospital till 2/19/19; during which she was diuresed ~15L, started on BB yet remained symptomatic. And decision was made to transfer patient to [facility].     Ms. 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      Per OSH records:  Pt presented on 2/5/29 via EMS for respiratory distress despite supplemental oxygen. Pt reports increased SOB over the last 4-5 days. On the day of admission significant SOB despite inhale and changing from oxygen compressor to tank. SpO2 75% on 3.5 L at home. Triage noted patient's SpO2 69% on 3L NC. Patient was placed on BiPaP w/ improvement in her respiratory function. CTA chest 2/7/19: without evidence of PE; moderate bilateral pleural effusions; RML consolidation. Thoracentesis 2/9/19: consistent with transudate. (thoracentesis 12/18 negative cytology). Patient was treated w/ antibiotics. Echo December 2018, IHSS, elevated peak gradient across LVOT at 179mmHg and a mean of 94mmHg. FELICIA performed. Patient remained in the hospital till 2/19/19; during which she was diuresed ~15L, started on BB yet remained symptomatic. And decision was made to transfer patient to [facility] [place] [facility].             Hospital Course:  Transferred to [facility] for higher level of cardiac care. Admitted to [facility] for acute diastolic heart failure HOCM with pulmonary edema and bilateral pleural effusions. Patient admitted 2/19/19. 2D echo repeated; septal size 2.2cm, ROSAURA with severe outflow tract left ventricular obstruction. The peak gradient is near 100mm Hg. Patient started on BB and CCB. Will need ablation as an outpatient.     2/21/19 patient h/h noted to be 7 down from 8.5 and subcutaneous ecchymosis noted around her lower abdomen. Lovenox stopped and patient was transferred to hospital medicine. EGD on 2/25/19 showed no acute findings, and no source of bleed.  2/26/19,  patients Hg dropped to 6.9 and she received her second blood transfusion.  Patient started on heparin ggt.   2/27/19.  Patient had episode of dyspnea overnight with sats remaining >88. On Heparin ggt for bleeding with adequate hg trends. LE US showed no DVTs.          Interval History: NAEON. Patient had episode of dyspnea overnight with sats remaining >88. On Heparin ggt for bleeding with adequate hg trends. LE US showed no DVTs.      Review of Systems   Constitutional: Positive for appetite change and fatigue. Negative for chills and fever.   HENT: Negative for sore throat and trouble swallowing.    Eyes: Negative for pain and visual disturbance.   Respiratory: Positive for cough (dry), chest tightness and shortness of breath (on exertion).    Cardiovascular: Negative for chest pain and palpitations.   Gastrointestinal: Negative for abdominal pain, blood in stool and nausea.   Genitourinary: Negative for difficulty urinating and dysuria.   Musculoskeletal: Negative for arthralgias and gait problem.   Neurological: Negative for dizziness and headaches.   Psychiatric/Behavioral: Negative for confusion. The patient is not nervous/anxious.      Objective:     Vital Signs (Most Recent):  Temp: 97.2 °F (36.2 °C) (02/27/19 0757)  Pulse: 65 (02/27/19 1131)  Resp: 17 (02/27/19 1131)  BP: 118/61 (02/27/19 0757)  SpO2: 100 % (02/27/19 1131) Vital Signs (24h Range):  Temp:  [97.2 °F (36.2 °C)-98.7 °F (37.1 °C)] 97.2 °F (36.2 °C)  Pulse:  [61-99] 65  Resp:  [16-22] 17  SpO2:  [92 %-100 %] 100 %  BP: (107-142)/(56-74) 118/61     Weight: 90.1 kg (198 lb 10.2 oz)  Body mass index is 33.05 kg/m².    Intake/Output Summary (Last 24 hours) at 2/27/2019 1310  Last data filed at 2/27/2019 0600  Gross per 24 hour   Intake 356.67 ml   Output 950 ml   Net -593.33 ml      Physical Exam   Constitutional: She is oriented to person, place, and time. She appears well-developed. No distress.   HENT:   Head: Normocephalic and atraumatic.   Neck:  Neck supple. No tracheal deviation present.   Cardiovascular: Normal rate, regular rhythm and intact distal pulses.   Murmur heard.   Systolic murmur is present.  Pulmonary/Chest: She has decreased breath sounds in the right lower field. She has wheezes (mild diffuse wheezes). She has rales (bibasilar).   On 3.5L nasal cannula, with SpO2 96%   Abdominal: Soft. She exhibits no distension.   Musculoskeletal: She exhibits edema (trace, bilateral lower extremities). She exhibits no deformity.   Neurological: She is alert and oriented to person, place, and time.   Skin: Skin is warm and dry.   Nursing note and vitals reviewed.      Significant Labs:   CBC:   Recent Labs   Lab 02/26/19  0544 02/26/19 2036 02/27/19  0552   WBC 5.26 9.75  9.75  9.75 6.83   HGB 6.9* 8.3*  8.3*  8.3* 8.1*   HCT 23.4* 26.8*  26.8*  26.8* 26.2*    255  255  255 260     CMP:   Recent Labs   Lab 02/26/19  0544 02/27/19  0552    138   K 4.4 4.4    105   CO2 24 25   * 110   BUN 28* 24*   CREATININE 0.8 0.9   CALCIUM 8.9 9.5   PROT 5.7* 6.2   ALBUMIN 2.4* 2.6*   BILITOT 0.3 0.6   ALKPHOS 80 87   AST 13 15   ALT 14 17   ANIONGAP 8 8   EGFRNONAA >60.0 >60.0       Significant Imaging: I have reviewed all pertinent imaging results/findings within the past 24 hours.    Assessment/Plan:      * Acute on chronic respiratory failure    - Patient acute respiratory failure is multifactorial secondary to: moderate COPD, pulmonary HTN (PA 49), HOCM, PE, MARISOL (CPAP at 4), bilateral pulmonary effusion, hx of RUL cancer s/p chemo&XRT, recently tx for CAP zosyn x 12 days   - treated for PNA at OSH, no signs of infection. No further txt required   - per OSH records patient not tolerating CPAP at night, would get hypoxic and was switched over to BiPAP  - pulmonary consulted; appreciate assistance but do not feel there is anything else to offer her inpatient    Plan  - will continue BiPAP qhs  -patient will need BiPAP on discharge  (order placed)  - wean O2 as able  -on discharge will need to have follow up with pulm          Acute blood loss anemia    - Patient w/ a history of ITP, bleeding diathesis, daughter with hemophilia B (implying pt is carrier), hx of bleeding.   - has been evaluated by hem/onc as outpatient and all workup was normal   - s/p 1 unit PRBCs  - hemoglobin 6.9 this a.m.  - EGD 2/25 showed a Normal esophagus, stomach and duodenum.    Plan:  - Use a proton pump inhibitor PO daily prophylactically since patient will be on heparin  -will continue to monitor patient with CBC q12h       Hypothyroidism    -patient has reported hypothyroidism  -TSH wnl    Plan  - continue home dose synthroid        Pulmonary HTN    - this is a chronic issue that is most likely exacerbating her SOB  - pulmonary artery systolic pressure (PASP)= 49  - Mean PAP can be approximated because mPAP = 0.61sPAP + 2  - Mean PAP= 31.89 (32)    Plan  -inpatient consult to interventional cardiology       HOCM (hypertrophic obstructive cardiomyopathy)    - Hypertrophic cardiomyopathy with asymmetric septal hypertrophy  - Systolic anterior motion of the mitral valve with severe outflow tract left ventricular obstruction. The peak gradient is near 100mm Hg.    Plan:  - continue BB and CCB  - uptitrate medications as tolerated   - cards following, appreciate recs   - will need outpatient interventional cardiology follow-up for ablation        Type 2 diabetes mellitus    -on Metformin at home and has great control (A1c was 5.6 on 2/19/19)  -patient has not required any SSI in last 48hrs  -BG remains in goal range    Plan  - Low dose insulin sliding scale   - bedside glucose monitoring         Pleural effusion, bilateral    - Thoracentesis 2/8 consistent w/ transudate (OSH studies)  - Thoracentesis 12/2018 cytology negative for malignancy  - Bilateral effusions notes on CXR        Chronic pulmonary embolism    - Hx of RML embolus diagnosed 12/2018  - h/h down  trending 6.9 (8.5 on admission)  - discontinued lovenox   - discussed with pulmonology and recommend starting on heparin drip to see if she tolerates, and then started on Xarelto this AM as patient did not tolerate Eliquis  -patient currently on a heparin ggt as she had a drop of her hg on xarelto    Plan:  -continue inpatient low dose heparin ggt  -discussed at length with patient and her daughter about the risks and benefits of anticoagulation (no anticoagulation vs warfaring vs xarelto).  They will think about it and decide       Acute on chronic diastolic congestive heart failure, NYHA class 3      Echo 2/19/2019  · Hypertrophic cardiomyopathy with asymmetric septal hypertrophy  · Normal left ventricular systolic function. The estimated ejection fraction is 65%  · Normal right ventricular systolic function.  · Severe left atrial enlargement.  · Systolic anterior motion of the mitral valve with severe outflow tract left ventricular obstruction. The peak gradient is near 100mm Hg.  · The estimated PA systolic pressure is 49 mm Hg  · Intermediate central venous pressure (8 mm Hg).     Plan:  - will hold off on diuresis; pt is preload dependent   - continue metoprolol 50 mg BID and Verapamil 80 daily       COPD (chronic obstructive pulmonary disease)    - continue LAMA/ICS/LABA  - duonebs q6 hrs     Plan:  -appears at baseline  -will continue to monitor closely and will d/c with follow up with Pulmonology         VTE Risk Mitigation (From admission, onward)        Ordered     heparin 25,000 units in dextrose 5% 250 ml (100 units/mL) infusion MINIMAL INTENSITY nomogram - OHS  Continuous      02/27/19 8869              Cristy Verdin MD  Department of Hospital Medicine   [facility]

## 2019-02-27 NOTE — PROGRESS NOTES
Patient was switched from the low intensity heparin gtt to the minimal intensity heparin gtt.  Called and spoke with Dr. Verdin regarding her rate.  Stated to keep her at the same rate that she is at now, 17 units/kg/hr, and just use the new minimal intensity nomogram from now on for the rate changes. Also reported her BP of 127/66 and dounble checked to make sure that they wanted me to give the patient the 1500 dose of verapimil.  Stated ok to give.

## 2019-02-27 NOTE — PLAN OF CARE
Problem: Adult Inpatient Plan of Care  Goal: Plan of Care Review  Patient remained in stable condition today.  Received 1 unit of blood this afternoon.  Heparin drip started at shift change.  Worked with PT.  Oxygen weaned down from 5 liter to 2 liters.  Patient will with SOB on exertion. Daughter at bedside and updated on POC

## 2019-02-27 NOTE — ASSESSMENT & PLAN NOTE
- this is a chronic issue that is most likely exacerbating her SOB  - pulmonary artery systolic pressure (PASP)= 49  - Mean PAP can be approximated because mPAP = 0.61sPAP + 2  - Mean PAP= 31.89 (32)    Plan  -inpatient consult to interventional cardiology

## 2019-02-27 NOTE — PLAN OF CARE
Problem: Occupational Therapy Goal  Goal: Occupational Therapy Goal  Goals to be met by: 3/21/2019    Patient will increase functional independence with ADLs by performing:    Toileting from toilet with Minimal Assistance for hygiene and clothing management.   Bathing from  standing at sink with Minimal Assistance.  Stand pivot transfers with Minimal Assistance. Met 2/25  Squat pivot transfers with Minimal Assistance. Met 2/25  Toilet transfer to bedside commode with Contact Guard Assistance.      Outcome: Ongoing (interventions implemented as appropriate)  Continue OT POC.  Rema Horner OT  2/27/2019

## 2019-02-27 NOTE — ASSESSMENT & PLAN NOTE
Agree with b-blockers and verapamil.   Titrate as tolerated to lower HR and increase ventricular filling time.   Judicious use of diuretics as patient is preload dependent.   If symptoms persist, despite optimal medical therapy, could consider ETOH ablation.

## 2019-02-27 NOTE — PT/OT/SLP PROGRESS
Occupational Therapy   Treatment    Name: Makenzie Leija  MRN: [id number]  Admitting Diagnosis:  Acute on chronic respiratory failure  2 Days Post-Op    Recommendations:     Discharge Recommendations: (HHOT)  Discharge Equipment Recommendations:  none  Barriers to discharge:  None    Assessment:     Makenzie Leija is a 78 y.o. female with a medical diagnosis of Acute on chronic respiratory failure.  She presents with impaired cardiovascular functioning and impaired endurance affected performance of ADLs and functional mobility. Pt pleasant and motivated to participate today in OT session, however was limited by pain in lower back and buttocks. Pt's daughter present and explained pt has been limited by previous therapy sessions due to fatigue and decreased cardivascular output. Pt tolerated UE exercises sitting up in bedside chair and completed grooming task with setup of face cleansing items similarly. Pt also attempted doffing socks but had difficulty reaching to feet. Pt, daughter, and OT also discussed a hip kit, specifically sock aide, to assist patient with donning socks as a possible equipment recommendation. Performance deficits affecting function are weakness, impaired endurance, impaired self care skills, impaired functional mobilty, gait instability, impaired balance, impaired cardiopulmonary response to activity. Pt would benefit from continued OT services to further improve ADLs and functional mobility performance for improved QOL. Recommended return to OT upon discharge.    Rehab Prognosis:  Fair; patient would benefit from acute skilled OT services to address these deficits and reach maximum level of function.       Plan:     Patient to be seen 3 x/week to address the above listed problems via self-care/home management, therapeutic activities, therapeutic exercises  · Plan of Care Expires: 03/21/19  · Plan of Care Reviewed with: patient, daughter    Subjective     Pain/Comfort:  Pain Rating 1:  0/10    Objective:     Communicated with: RN prior to session.  Patient found up in chair with PureWick, telemetry, oxygen upon OT entry to room.    General Precautions: Standard, fall, respiratory   Orthopedic Precautions:N/A   Braces: N/A     Occupational Performance:   Activities of Daily Living:  · Grooming: modified independence with face cleansing requiring setup of materials seated in bedside chair  · Lower Body Dressing: total assistance with doffing socks sitting in bedside chair. Pt was able to successfully perform leg-cross technique but unable to fully reach feet to doff socks.      [facility] 6 Click ADL: 16    Treatment & Education:  Pt and daughter educated on role of OT, POC, and safety during ADLs and functional mobility to which both individuals verbalized understanding. Pt completed grooming task with setup of materials and LB dressing task total (A) sitting up in chair, as well as the following UE exercises, 1x10 each:  -shoulder flexion/extension  -elbow flexion/extension  - wrist flexion/extension  -finger flexion/extension  Pt was also educated on the importance of continued mobility as well as the continuation of hobbies for overall improved QOL. Pt verbalized understanding.  Patient left up in chair with all lines intact, call button in reach and daughter presentEducation:      GOALS:   Multidisciplinary Problems     Occupational Therapy Goals        Problem: Occupational Therapy Goal    Goal Priority Disciplines Outcome Interventions   Occupational Therapy Goal     OT, PT/OT Ongoing (interventions implemented as appropriate)    Description:  Goals to be met by: 3/21/2019    Patient will increase functional independence with ADLs by performing:    Toileting from toilet with Minimal Assistance for hygiene and clothing management.   Bathing from  standing at sink with Minimal Assistance.  Stand pivot transfers with Minimal Assistance. Met 2/25  Squat pivot transfers with Minimal Assistance. Met  2/25  Toilet transfer to bedside commode with Contact Guard Assistance.                       Time Tracking:     OT Date of Treatment: 02/27/19  OT Start Time: 0919  OT Stop Time: 0938  OT Total Time (min): 19 min    Billable Minutes:Therapeutic Exercise 19 minutes    Rema Horner OT  2/27/2019

## 2019-02-28 LAB
ALBUMIN SERPL BCP-MCNC: 2.7 G/DL
ALP SERPL-CCNC: 89 U/L
ALT SERPL W/O P-5'-P-CCNC: 25 U/L
ANION GAP SERPL CALC-SCNC: 9 MMOL/L
APTT BLDCRRT: 25.8 SEC
APTT BLDCRRT: 25.8 SEC
APTT BLDCRRT: 28 SEC
APTT BLDCRRT: 89.7 SEC
AST SERPL-CCNC: 20 U/L
BASOPHILS # BLD AUTO: 0.05 K/UL
BASOPHILS # BLD AUTO: 0.07 K/UL
BASOPHILS NFR BLD: 0.6 %
BASOPHILS NFR BLD: 0.8 %
BILIRUB SERPL-MCNC: 0.5 MG/DL
BNP SERPL-MCNC: 420 PG/ML
BUN SERPL-MCNC: 28 MG/DL
CALCIUM SERPL-MCNC: 9.5 MG/DL
CHLORIDE SERPL-SCNC: 102 MMOL/L
CO2 SERPL-SCNC: 26 MMOL/L
CREAT SERPL-MCNC: 0.8 MG/DL
DIFFERENTIAL METHOD: ABNORMAL
DIFFERENTIAL METHOD: ABNORMAL
EOSINOPHIL # BLD AUTO: 0.6 K/UL
EOSINOPHIL # BLD AUTO: 0.7 K/UL
EOSINOPHIL NFR BLD: 7.1 %
EOSINOPHIL NFR BLD: 7.5 %
ERYTHROCYTE [DISTWIDTH] IN BLOOD BY AUTOMATED COUNT: 15.7 %
ERYTHROCYTE [DISTWIDTH] IN BLOOD BY AUTOMATED COUNT: 15.8 %
EST. GFR  (AFRICAN AMERICAN): >60 ML/MIN/1.73 M^2
EST. GFR  (NON AFRICAN AMERICAN): >60 ML/MIN/1.73 M^2
GLUCOSE SERPL-MCNC: 124 MG/DL
HCT VFR BLD AUTO: 25.8 %
HCT VFR BLD AUTO: 25.9 %
HGB BLD-MCNC: 8 G/DL
HGB BLD-MCNC: 8.1 G/DL
IMM GRANULOCYTES # BLD AUTO: 0.1 K/UL
IMM GRANULOCYTES # BLD AUTO: 0.15 K/UL
IMM GRANULOCYTES NFR BLD AUTO: 1.3 %
IMM GRANULOCYTES NFR BLD AUTO: 1.6 %
LYMPHOCYTES # BLD AUTO: 0.9 K/UL
LYMPHOCYTES # BLD AUTO: 1.1 K/UL
LYMPHOCYTES NFR BLD: 11.5 %
LYMPHOCYTES NFR BLD: 11.9 %
MAGNESIUM SERPL-MCNC: 1.9 MG/DL
MCH RBC QN AUTO: 28.7 PG
MCH RBC QN AUTO: 29.1 PG
MCHC RBC AUTO-ENTMCNC: 31 G/DL
MCHC RBC AUTO-ENTMCNC: 31.3 G/DL
MCV RBC AUTO: 93 FL
MCV RBC AUTO: 93 FL
MONOCYTES # BLD AUTO: 0.7 K/UL
MONOCYTES # BLD AUTO: 0.7 K/UL
MONOCYTES NFR BLD: 7.4 %
MONOCYTES NFR BLD: 9.3 %
NEUTROPHILS # BLD AUTO: 5.5 K/UL
NEUTROPHILS # BLD AUTO: 6.6 K/UL
NEUTROPHILS NFR BLD: 69.8 %
NEUTROPHILS NFR BLD: 71.2 %
NRBC BLD-RTO: 0 /100 WBC
NRBC BLD-RTO: 0 /100 WBC
PLATELET # BLD AUTO: 277 K/UL
PLATELET # BLD AUTO: 301 K/UL
PMV BLD AUTO: 10.2 FL
PMV BLD AUTO: 9.8 FL
POCT GLUCOSE: 142 MG/DL (ref 70–110)
POCT GLUCOSE: 146 MG/DL (ref 70–110)
POCT GLUCOSE: 147 MG/DL (ref 70–110)
POCT GLUCOSE: 167 MG/DL (ref 70–110)
POTASSIUM SERPL-SCNC: 4.3 MMOL/L
PROT SERPL-MCNC: 6.4 G/DL
RBC # BLD AUTO: 2.78 M/UL
RBC # BLD AUTO: 2.79 M/UL
SODIUM SERPL-SCNC: 137 MMOL/L
WBC # BLD AUTO: 7.85 K/UL
WBC # BLD AUTO: 9.32 K/UL

## 2019-02-28 PROCEDURE — 27000221 HC OXYGEN, UP TO 24 HOURS

## 2019-02-28 PROCEDURE — 97116 GAIT TRAINING THERAPY: CPT

## 2019-02-28 PROCEDURE — 85730 THROMBOPLASTIN TIME PARTIAL: CPT

## 2019-02-28 PROCEDURE — 85025 COMPLETE CBC W/AUTO DIFF WBC: CPT

## 2019-02-28 PROCEDURE — 25000003 PHARM REV CODE 250: Performed by: STUDENT IN AN ORGANIZED HEALTH CARE EDUCATION/TRAINING PROGRAM

## 2019-02-28 PROCEDURE — 83880 ASSAY OF NATRIURETIC PEPTIDE: CPT

## 2019-02-28 PROCEDURE — 83735 ASSAY OF MAGNESIUM: CPT

## 2019-02-28 PROCEDURE — 25000003 PHARM REV CODE 250: Performed by: HOSPITALIST

## 2019-02-28 PROCEDURE — 25000242 PHARM REV CODE 250 ALT 637 W/ HCPCS: Performed by: STUDENT IN AN ORGANIZED HEALTH CARE EDUCATION/TRAINING PROGRAM

## 2019-02-28 PROCEDURE — 99900035 HC TECH TIME PER 15 MIN (STAT)

## 2019-02-28 PROCEDURE — 80053 COMPREHEN METABOLIC PANEL: CPT

## 2019-02-28 PROCEDURE — 63600175 PHARM REV CODE 636 W HCPCS: Performed by: HOSPITALIST

## 2019-02-28 PROCEDURE — 85730 THROMBOPLASTIN TIME PARTIAL: CPT | Mod: 91

## 2019-02-28 PROCEDURE — 11000001 HC ACUTE MED/SURG PRIVATE ROOM

## 2019-02-28 PROCEDURE — 94799 UNLISTED PULMONARY SVC/PX: CPT

## 2019-02-28 PROCEDURE — 63600175 PHARM REV CODE 636 W HCPCS: Performed by: PHYSICIAN ASSISTANT

## 2019-02-28 PROCEDURE — 97530 THERAPEUTIC ACTIVITIES: CPT

## 2019-02-28 PROCEDURE — 25000003 PHARM REV CODE 250: Performed by: INTERNAL MEDICINE

## 2019-02-28 PROCEDURE — 63600175 PHARM REV CODE 636 W HCPCS: Performed by: STUDENT IN AN ORGANIZED HEALTH CARE EDUCATION/TRAINING PROGRAM

## 2019-02-28 PROCEDURE — 94640 AIRWAY INHALATION TREATMENT: CPT

## 2019-02-28 RX ORDER — HYDROCODONE BITARTRATE AND ACETAMINOPHEN 10; 325 MG/1; MG/1
1 TABLET ORAL EVERY 4 HOURS PRN
Status: DISCONTINUED | OUTPATIENT
Start: 2019-02-28 | End: 2019-03-08 | Stop reason: HOSPADM

## 2019-02-28 RX ORDER — WARFARIN SODIUM 5 MG/1
5 TABLET ORAL DAILY
Status: DISCONTINUED | OUTPATIENT
Start: 2019-03-01 | End: 2019-03-03

## 2019-02-28 RX ORDER — LEVALBUTEROL 1.25 MG/.5ML
1.25 SOLUTION, CONCENTRATE RESPIRATORY (INHALATION) EVERY 6 HOURS PRN
Status: DISCONTINUED | OUTPATIENT
Start: 2019-02-28 | End: 2019-03-08 | Stop reason: HOSPADM

## 2019-02-28 RX ADMIN — VERAPAMIL HYDROCHLORIDE 240 MG: 240 TABLET, FILM COATED, EXTENDED RELEASE ORAL at 09:02

## 2019-02-28 RX ADMIN — GABAPENTIN 100 MG: 100 CAPSULE ORAL at 10:02

## 2019-02-28 RX ADMIN — GABAPENTIN 100 MG: 100 CAPSULE ORAL at 09:02

## 2019-02-28 RX ADMIN — METOPROLOL SUCCINATE 100 MG: 100 TABLET, EXTENDED RELEASE ORAL at 10:02

## 2019-02-28 RX ADMIN — LEVOTHYROXINE SODIUM 112 MCG: 112 TABLET ORAL at 06:02

## 2019-02-28 RX ADMIN — ACETYLCYSTEINE 4 ML: 100 SOLUTION ORAL; RESPIRATORY (INHALATION) at 09:02

## 2019-02-28 RX ADMIN — HEPARIN SODIUM AND DEXTROSE 13 UNITS/KG/HR: 10000; 5 INJECTION INTRAVENOUS at 11:02

## 2019-02-28 RX ADMIN — HEPARIN SODIUM AND DEXTROSE 13 UNITS/KG/HR: 10000; 5 INJECTION INTRAVENOUS at 06:02

## 2019-02-28 RX ADMIN — DOCUSATE SODIUM 100 MG: 100 CAPSULE, LIQUID FILLED ORAL at 10:02

## 2019-02-28 RX ADMIN — FUROSEMIDE 2.5 MG/HR: 10 INJECTION, SOLUTION INTRAMUSCULAR; INTRAVENOUS at 04:02

## 2019-02-28 RX ADMIN — FUROSEMIDE 20 MG: 10 INJECTION, SOLUTION INTRAMUSCULAR; INTRAVENOUS at 01:02

## 2019-02-28 RX ADMIN — HYDROCODONE BITARTRATE AND ACETAMINOPHEN 1 TABLET: 5; 325 TABLET ORAL at 06:02

## 2019-02-28 RX ADMIN — IPRATROPIUM BROMIDE AND ALBUTEROL SULFATE 3 ML: .5; 3 SOLUTION RESPIRATORY (INHALATION) at 09:02

## 2019-02-28 RX ADMIN — FLUTICASONE PROPIONATE 50 MCG: 50 SPRAY, METERED NASAL at 10:02

## 2019-02-28 RX ADMIN — IPRATROPIUM BROMIDE AND ALBUTEROL SULFATE 3 ML: .5; 3 SOLUTION RESPIRATORY (INHALATION) at 02:02

## 2019-02-28 RX ADMIN — HYDROCODONE BITARTRATE AND ACETAMINOPHEN 1 TABLET: 5; 325 TABLET ORAL at 03:02

## 2019-02-28 RX ADMIN — TIOTROPIUM BROMIDE 18 MCG: 18 CAPSULE ORAL; RESPIRATORY (INHALATION) at 10:02

## 2019-02-28 RX ADMIN — ALPRAZOLAM 0.5 MG: 0.5 TABLET ORAL at 07:02

## 2019-02-28 RX ADMIN — ACETYLCYSTEINE 4 ML: 100 SOLUTION ORAL; RESPIRATORY (INHALATION) at 08:02

## 2019-02-28 RX ADMIN — PANTOPRAZOLE SODIUM 40 MG: 40 TABLET, DELAYED RELEASE ORAL at 10:02

## 2019-02-28 RX ADMIN — MONTELUKAST SODIUM 10 MG: 10 TABLET, FILM COATED ORAL at 09:02

## 2019-02-28 RX ADMIN — LEVALBUTEROL 1.25 MG: 1.25 SOLUTION, CONCENTRATE RESPIRATORY (INHALATION) at 08:02

## 2019-02-28 NOTE — PROGRESS NOTES
When the heparin nomogram was changed, the order for the PTT was discontinued with the previous order.  Dr. Jamison on floor and notified that I am drawing one now, but it's 90 minutes late.  She verbalized understanding.

## 2019-02-28 NOTE — ASSESSMENT & PLAN NOTE
- Thoracentesis 2/8 consistent w/ transudate (OSH studies)  - Thoracentesis 12/2018 cytology negative for malignancy  - Bilateral effusions notes on CXR     Plan  -patient responded well to 20IV lasix last night and will start patient on lasix ggt  -will monitor closely as patient is pre-load depended (given her HOCM)

## 2019-02-28 NOTE — ASSESSMENT & PLAN NOTE
Echo 2/19/2019  · Hypertrophic cardiomyopathy with asymmetric septal hypertrophy  · Normal left ventricular systolic function. The estimated ejection fraction is 65%  · Normal right ventricular systolic function.  · Severe left atrial enlargement.  · Systolic anterior motion of the mitral valve with severe outflow tract left ventricular obstruction. The peak gradient is near 100mm Hg.  · The estimated PA systolic pressure is 49 mm Hg  · Intermediate central venous pressure (8 mm Hg).     Plan:  - will hold off on diuresis; pt is preload dependent   -switched to long acting metoprolol succinate 50 mg and Verapamil 240 (from 80) daily

## 2019-02-28 NOTE — PLAN OF CARE
Problem: Adult Inpatient Plan of Care  Goal: Plan of Care Review  Outcome: Ongoing (interventions implemented as appropriate)   02/28/19 3385   Plan of Care Review   Plan of Care Reviewed With patient     Pt free of falls/trauma/injuries. Bed in low position, wheels locked, and call light within reach. Skin integrity remains unchanged. CBG monitored as ordered. Heparin gtt infusing; adjusted per heparin nomogram. Repeat aPTT ordered per nomogram. Lasix gtt started as ordered. VSS and afebrile. No distress noted/reported at this time. Will continue to monitor.

## 2019-02-28 NOTE — ASSESSMENT & PLAN NOTE
- Hypertrophic cardiomyopathy with asymmetric septal hypertrophy  - Systolic anterior motion of the mitral valve with severe outflow tract left ventricular obstruction. The peak gradient is near 100mm Hg.    Plan:  -Interventional cardiology saw patient and increased her medications to help with her symptoms, lower HR and increase ventricular filling time.   - will need outpatient interventional cardiology follow-up for ablation

## 2019-02-28 NOTE — PROGRESS NOTES
[facility] Hospital Medicine  Progress Note    Patient Name: Makenzie Leija  MRN: [id number]  Patient Class: IP- Inpatient   Admission Date: 2/19/2019  Length of Stay: 9 days  Attending Physician: Lena Jamison MD  Primary Care Provider: Karthik Roth MD    Hospital Medicine Team: [facility] [place] HOSP MED O Cristy Verdin MD    Subjective:     Principal Problem:Acute on chronic respiratory failure    HPI:  Ms. 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      Per OSH records:  Pt presented on 2/5/29 via EMS for respiratory distress despite supplemental oxygen. Pt reports increased SOB over the last 4-5 days. On the day of admission significant SOB despite inhale and changing from oxygen compressor to tank. SpO2 75% on 3.5 L at home. Triage noted patient's SpO2 69% on 3L NC. Patient was placed on BiPaP w/ improvement in her respiratory function. CTA chest 2/7/19: without evidence of PE; moderate bilateral pleural effusions; RML consolidation. Thoracentesis 2/9/19: consistent with transudate. (thoracentesis 12/18 negative cytology). Patient was treated w/ antibiotics. Echo December 2018, IHSS, elevated peak gradient across LVOT at 179mmHg and a mean of 94mmHg. FELICIA performed. Patient remained in the hospital till 2/19/19; during which she was diuresed ~15L, started on BB yet remained symptomatic. And decision was made to transfer patient to [facility] [place].     Ms. 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      Per OSH records:  Pt presented on 2/5/29 via EMS for respiratory distress despite supplemental oxygen. Pt reports increased SOB over the last 4-5 days. On the day of admission significant SOB despite inhale and changing from oxygen compressor to tank. SpO2 75% on 3.5 L at home. Triage noted patient's SpO2 69% on 3L NC. Patient was placed on BiPaP w/ improvement in her respiratory function. CTA chest 2/7/19: without evidence of PE; moderate bilateral pleural effusions; RML consolidation. Thoracentesis 2/9/19: consistent with transudate. (thoracentesis 12/18 negative cytology). Patient was treated w/ antibiotics. Echo December 2018, IHSS, elevated peak gradient across LVOT at 179mmHg and a mean of 94mmHg. FELICIA performed. Patient remained in the hospital till 2/19/19; during which she was diuresed ~15L, started on BB yet remained symptomatic. And decision was made to transfer patient to [facility].             Hospital Course:  Transferred to [facility] for higher level of cardiac care. Admitted to [facility] for acute diastolic heart failure HOCM with pulmonary edema and bilateral pleural effusions. Patient admitted 2/19/19. 2D echo repeated; septal size 2.2cm, ROSAURA with severe outflow tract left ventricular obstruction. The peak gradient is near 100mm Hg. Patient started on BB and CCB. Will need ablation as an outpatient.     2/21/19 patient h/h noted to be 7 down from 8.5 and subcutaneous ecchymosis noted around her lower abdomen. Lovenox stopped and patient was transferred to hospital medicine. EGD on 2/25/19 showed no acute findings, and no source of bleed.  2/26/19,  patients Hg dropped to 6.9 and she received her second blood transfusion.  Patient started on heparin ggt.   2/27/19.  Patient had episode of dyspnea overnight with sats remaining >88. On Heparin ggt for bleeding with adequate hg trends. LE US showed no DVTs.  CXR showed increased pulmonary edema and R sided effusion appears increased. US of abdomen showed no ascities. Interventional cardiology came to assess patient in light of HOCM and being +symptomatic.   2/28/19: Patient desatted overnight, requiring 6L of O2 for SpO2 88-90%.  Patient with pitting edema and CXR that showed fluid, given 20Lasix and responded well (-2L). Patient had a nose bleed overnight and coughed a small amount of blood. Heparin ggt was stopped temporarily and restarted AM. Patient started on lasix ggt.        Interval History:  Yesterday evening CXR showed increased pulmonary edema and R sided effusion appears increased. US of abdomen showed no ascities. Interventional cardiology came to assess patient in light of HOCM and being +symptomatic and agree with medical management. Patient desatted overnight, requiring 6L of O2 for SpO2 88-90%.  Patient with pitting edema and CXR that showed fluid, given 20Lasix and responded well (-2L). Patient had a nose bleed overnight and coughed a small amount of blood. Heparin ggt was stopped temporarily and restarted AM. Patient started on lasix ggt today.  Patient still deciding on anticoagulation regimen.    Review of Systems   Constitutional: Positive for appetite change and fatigue. Negative for chills and fever.   HENT: Negative for sore throat and trouble swallowing.    Eyes: Negative for pain and visual disturbance.   Respiratory: Positive for cough (dry), chest tightness and shortness of breath (on exertion).    Cardiovascular: Negative for chest pain and palpitations.   Gastrointestinal: Negative for abdominal pain, blood in stool and nausea.   Genitourinary: Negative for difficulty urinating and  dysuria.   Musculoskeletal: Negative for arthralgias and gait problem.   Neurological: Negative for dizziness and headaches.   Psychiatric/Behavioral: Negative for confusion. The patient is not nervous/anxious.      Objective:     Vital Signs (Most Recent):  Temp: 98.4 °F (36.9 °C) (02/28/19 0407)  Pulse: 71 (02/28/19 1232)  Resp: 19 (02/28/19 1232)  BP: (!) 106/56 (02/28/19 1232)  SpO2: 98 % (02/28/19 1232) Vital Signs (24h Range):  Temp:  [98.2 °F (36.8 °C)-98.6 °F (37 °C)] 98.4 °F (36.9 °C)  Pulse:  [61-87] 71  Resp:  [18-25] 19  SpO2:  [84 %-99 %] 98 %  BP: ()/(56-80) 106/56     Weight: 89.5 kg (197 lb 5 oz)  Body mass index is 32.83 kg/m².    Intake/Output Summary (Last 24 hours) at 2/28/2019 1447  Last data filed at 2/28/2019 1300  Gross per 24 hour   Intake 480 ml   Output 2000 ml   Net -1520 ml      Physical Exam   Constitutional: She is oriented to person, place, and time. She appears well-developed. No distress.   HENT:   Head: Normocephalic and atraumatic.   Neck: Neck supple. No tracheal deviation present.   Cardiovascular: Normal rate, regular rhythm and intact distal pulses.   Murmur heard.   Systolic murmur is present.  Pulmonary/Chest: She has decreased breath sounds in the right lower field. She has wheezes (mild diffuse wheezes). She has rales (bibasilar).   On 3.5L nasal cannula, with SpO2 96%   Abdominal: Soft. She exhibits no distension.   Musculoskeletal: She exhibits edema (trace, bilateral lower extremities). She exhibits no deformity.   Neurological: She is alert and oriented to person, place, and time.   Skin: Skin is warm and dry.   Nursing note and vitals reviewed.      Significant Labs:   CBC:   Recent Labs   Lab 02/27/19  0552 02/27/19  1811 02/28/19  0814   WBC 6.83 8.16  8.16 7.85   HGB 8.1* 8.0*  8.0* 8.1*   HCT 26.2* 25.9*  25.9* 25.9*    274  274 277     CMP:   Recent Labs   Lab 02/27/19  0552 02/28/19  0814    137   K 4.4 4.3    102   CO2 25 26   GLU  110 124*   BUN 24* 28*   CREATININE 0.9 0.8   CALCIUM 9.5 9.5   PROT 6.2 6.4   ALBUMIN 2.6* 2.7*   BILITOT 0.6 0.5   ALKPHOS 87 89   AST 15 20   ALT 17 25   ANIONGAP 8 9   EGFRNONAA >60.0 >60.0       Significant Imaging: I have reviewed all pertinent imaging results/findings within the past 24 hours.    Assessment/Plan:      * Acute on chronic respiratory failure    - Patient acute respiratory failure is multifactorial secondary to: moderate COPD, pulmonary HTN (PA 49), HOCM, PE, MARISOL (CPAP at 4), bilateral pulmonary effusion, hx of RUL cancer s/p chemo&XRT, recently tx for CAP zosyn x 12 days   - treated for PNA at OSH, no signs of infection. No further txt required   - per OSH records patient not tolerating CPAP at night, would get hypoxic and was switched over to BiPAP  - pulmonary consulted; appreciate assistance but do not feel there is anything else to offer her inpatient  -patient continues to experience increasing dyspnea. KUB and Abdo US imaging show no GI etiology of abdominal distention, likely fluid.  CXR shows increasing pulmonary edema and R sided effusion  -patient received 20IV lasix last night and tolerated well (UOP, 2L)    Plan  -will start patient on lasix ggt  - will continue BiPAP qhs  -patient will need BiPAP on discharge (order placed)  - wean O2 as able  -on discharge will need to have follow up with pulm and interventional cardiology         Acute blood loss anemia    - Patient w/ a history of ITP, bleeding diathesis, daughter with hemophilia B (implying pt is carrier), hx of bleeding.   - has been evaluated by hem/onc as outpatient and all workup was normal   - s/p 1 unit PRBCs  - hemoglobin 6.9 this a.m.  - EGD 2/25 showed a Normal esophagus, stomach and duodenum.    Plan:  - Use a proton pump inhibitor PO daily prophylactically since patient will be on heparin  -will continue to monitor patient with CBC q12h  -patient and family to decide re: no anticoagulation vs anticoagulation (in  setting of unprovoked PE, patient has an increase risk of another PE) with coumadin (frequent monitoring) vs xarelto (not reversible)       Hypothyroidism    -patient has reported hypothyroidism  -TSH wnl    Plan  - continue home dose synthroid        Pulmonary HTN    - this is a chronic issue that is most likely exacerbating her SOB  - pulmonary artery systolic pressure (PASP)= 49  - Mean PAP can be approximated because mPAP = 0.61sPAP + 2  - Mean PAP= 31.89 (32)         HOCM (hypertrophic obstructive cardiomyopathy)    - Hypertrophic cardiomyopathy with asymmetric septal hypertrophy  - Systolic anterior motion of the mitral valve with severe outflow tract left ventricular obstruction. The peak gradient is near 100mm Hg.    Plan:  -Interventional cardiology saw patient and increased her medications to help with her symptoms, lower HR and increase ventricular filling time.   - will need outpatient interventional cardiology follow-up for ablation        Type 2 diabetes mellitus    -on Metformin at home and has great control (A1c was 5.6 on 2/19/19)  -patient has not required any SSI in last 48hrs  -BG remains in goal range    Plan  - Low dose insulin sliding scale   - bedside glucose monitoring         Pleural effusion, bilateral    - Thoracentesis 2/8 consistent w/ transudate (OSH studies)  - Thoracentesis 12/2018 cytology negative for malignancy  - Bilateral effusions notes on CXR     Plan  -patient responded well to 20IV lasix last night and will start patient on lasix ggt  -will monitor closely as patient is pre-load depended (given her HOCM)       Chronic pulmonary embolism    - Hx of RML embolus diagnosed 12/2018  - h/h down trending 6.9 (8.5 on admission)  - discontinued lovenox   - discussed with pulmonology and recommend starting on heparin drip to see if she tolerates, and then started on Xarelto this AM as patient did not tolerate Eliquis  -patient currently on a heparin ggt as she had a drop of her hg on  xarelto    Plan:  -continue inpatient low dose heparin ggt  -discussed at length with patient and her daughter about the risks and benefits of anticoagulation (no anticoagulation vs warfaring vs xarelto).  They will think about it and decide       Acute on chronic diastolic congestive heart failure, NYHA class 3      Echo 2/19/2019  · Hypertrophic cardiomyopathy with asymmetric septal hypertrophy  · Normal left ventricular systolic function. The estimated ejection fraction is 65%  · Normal right ventricular systolic function.  · Severe left atrial enlargement.  · Systolic anterior motion of the mitral valve with severe outflow tract left ventricular obstruction. The peak gradient is near 100mm Hg.  · The estimated PA systolic pressure is 49 mm Hg  · Intermediate central venous pressure (8 mm Hg).     Plan:  - will hold off on diuresis; pt is preload dependent   -switched to long acting metoprolol succinate 50 mg and Verapamil 240 (from 80) daily       COPD (chronic obstructive pulmonary disease)    - continue LAMA/ICS/LABA  - duonebs q6 hrs     Plan:  -appears at baseline  -will continue to monitor closely and will d/c with follow up with Pulmonology         VTE Risk Mitigation (From admission, onward)        Ordered     heparin 25,000 units in dextrose 5% 250 ml (100 units/mL) infusion MINIMAL INTENSITY nomogram - [facility]  Continuous      02/27/19 8979              Cristy Verdin MD  Department of Hospital Medicine   [facility]

## 2019-02-28 NOTE — PT/OT/SLP PROGRESS
Physical Therapy Treatment    Patient Name:  Makenize SCHWARTZ Leija   MRN:  [id number]  Admitting Diagnosis: Acute on chronic respiratory failure  Recent Surgery: Procedure(s) (LRB):  EGD (ESOPHAGOGASTRODUODENOSCOPY) (N/A) 3 Days Post-Op    Recommendations:     Discharge Recommendations:  ( PT)   Discharge Equipment Recommendations: none   Barriers to discharge: None    Plan:     During this hospitalization, patient to be seen 3 x/week to address the above listed problems via gait training, therapeutic activities, therapeutic exercises  · Plan of Care Expires:  03/21/19   Plan of Care Reviewed with: patient, family    This Plan of care has been discussed with the patient who was involved in its development and understands and is in agreement with the identified goals and treatment plan    Subjective     Communicated with RN (Luh) prior to session.     Patient comments: Pt with no complaints  Pain/Comfort:  · Pain Rating 1: 0/10  · Pain Rating Post-Intervention 1: 0/10    Objective:     Patient found with: oxygen, PureWick, telemetry    Patient found UIC upon PT entry to room, agreeable to treatment.  Family member present in the room.    General Precautions: Standard, fall, respiratory   Orthopedic Precautions:N/A   Braces: N/A       BED MOBILITY        NP 2* pt found/left seated UIC     TRANSFERS  (vc's for hand placement, sequencing of task and safety)   Patient completed Sit <> Stand Transfer from BS chair with SBA for safety with rollator x2 trial(s)   Patient completed Stand <> Sit Transfer to Bs chair with SBA for safety with rollator      GAIT: in hallway with O2 tank for 4LPM via NC   Patient ambulated: 110ft   Patient required: SBA for safety   Patient used:  rollator   Gait Pattern observed: reciprocal gait   Gait Deviation(s): decreased michell and mild instability, but no LOB    Comments: vc's for appropriate breathing and safety    EDUCATION  Patient provided with daily orientation and goals of this PT  session. They were educated to call for assistance and to transfer with hospital staff only.  Also, pt was educated on the effects of prolonged immobility and the importance of performing OOB activity to promote healing and reduce recovery time    Whiteboard updated with correct mobility information. RN/PCT notified.  Pt safe to transfer and amb with RN/PCT: Use rollator with SBA.    Patient left up in chair, with  all lines intact, call button in reach, RN notified and family member present    AM-PAC 6 CLICK MOBILITY  Turning over in bed (including adjusting bedclothes, sheets and blankets)?: 4  Sitting down on and standing up from a chair with arms (e.g., wheelchair, bedside commode, etc.): 3  Moving from lying on back to sitting on the side of the bed?: 4  Moving to and from a bed to a chair (including a wheelchair)?: 3  Need to walk in hospital room?: 3  Climbing 3-5 steps with a railing?: 2  Basic Mobility Total Score: 19     Assessment:     Makenzie Leija is a 78 y.o. female admitted with a medical diagnosis of Acute on chronic respiratory failure.  She presents with the following impairments/functional limitations:  weakness, impaired endurance, impaired self care skills, impaired functional mobilty, gait instability, impaired cardiopulmonary response to activity. requiring assistance and verbal cues for transfers and gait to prevent falls due to mild instability and fatigue.  Pt will cont to benefit from skilled PT intervention to address deficits and improve functional mobility.     Rehab Prognosis:  Good; patient would benefit from acute skilled PT services to address these deficits and reach maximum level of function.      GOALS:   Multidisciplinary Problems     Physical Therapy Goals        Problem: Physical Therapy Goal    Goal Priority Disciplines Outcome Goal Variances Interventions   Physical Therapy Goal     PT, PT/OT Ongoing (interventions implemented as appropriate)     Description:  Goals to be  met by: 3/6/2019     Patient will increase functional independence with mobility by performin. Supine to sit with Set-up Placer  2. Sit to supine with Set-up Placer  3. Sit to stand transfer with Contact Guard Assistance - Met       Revised: Sit to stand transfer with Supervision - Not Met  4. Bed to chair transfer with Contact Guard Assistance using Rolling Walker - Met      Revised: Bed to chair transfer with Supervision using Rolling Walker - Not Met  5. Gait  x 75 feet with Contact Guard Assistance using Rolling Walker. - Met      Revised: Gait  x 200 feet with Supervision using Rolling Walker. - Not Met                       Time Tracking:     PT Received On: 19  PT Start Time: 1114     PT Stop Time: 1146  PT Total Time (min): 32 min     Billable Minutes: Gait Training 15 and Therapeutic Activity 17    Treatment Type: Treatment  PT/PTA: PTA     PTA Visit Number: 2       Estelle Ramos PTA.  Pager [phone]    2019    .

## 2019-02-28 NOTE — PLAN OF CARE
Problem: Adult Inpatient Plan of Care  Goal: Plan of Care Review  Outcome: Ongoing (interventions implemented as appropriate)  Patient remained in stable condition this shift.  Daughter at bedside.  Heparin gtt changed from low intensity to minimal intensity.  Able to make needs known to staff. Sat in chair most of the day and used the walker to get to the bathroom when needeed

## 2019-02-28 NOTE — ASSESSMENT & PLAN NOTE
- Patient acute respiratory failure is multifactorial secondary to: moderate COPD, pulmonary HTN (PA 49), HOCM, PE, MARISOL (CPAP at 4), bilateral pulmonary effusion, hx of RUL cancer s/p chemo&XRT, recently tx for CAP zosyn x 12 days   - treated for PNA at OSH, no signs of infection. No further txt required   - per OSH records patient not tolerating CPAP at night, would get hypoxic and was switched over to BiPAP  - pulmonary consulted; appreciate assistance but do not feel there is anything else to offer her inpatient  -patient continues to experience increasing dyspnea. KUB and Abdo US imaging show no GI etiology of abdominal distention, likely fluid.  CXR shows increasing pulmonary edema and R sided effusion  -patient received 20IV lasix last night and tolerated well (UOP, 2L)    Plan  -continue lasix ggt, measure ins and outs more closely  - will continue BiPAP qhs  -patient will need BiPAP on discharge (order placed)  - wean O2 as able  -on discharge will need to have follow up with pulm and interventional cardiology

## 2019-02-28 NOTE — PLAN OF CARE
Problem: Diabetes Comorbidity  Goal: Blood Glucose Level Within Desired Range  Outcome: Ongoing (interventions implemented as appropriate)  Pt and family verbalized understanding of AIC and need for 200 ernesto ADA, demonstrated proper technique for obtaining hs glucose check. Continue to reinforce teaching. Patient desat 80's requiring increased 02 demands. Tolerated IV lasix. Remain

## 2019-02-28 NOTE — SIGNIFICANT EVENT
Patient desatted initially, requiring 6L of O2 for SpO2 88-90%.  Patient anxious and family member at bedside refusing xanax and breathing treatment.  After family member left oxygen increased to 92% and patient with less dyspnea.      CXR showed increased pulmonary edema and R sided effusion appears increased.  1+ pitting edema noted to BLE.  Will give one dose of 20 mg IVP lasix for now as patient preload dependent and monitor clinically. Normal home dose 40 mg PO BID.  Discussed with cards briefly overnight.

## 2019-02-28 NOTE — ASSESSMENT & PLAN NOTE
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   - has been evaluated by hem/onc as outpatient and all workup was normal   - s/p 1 unit PRBCs  - hemoglobin 6.9 this a.m.  - EGD 2/25 showed a Normal esophagus, stomach and duodenum.    Plan:  - Use a proton pump inhibitor PO daily prophylactically since patient will be on heparin  -will continue to monitor patient with CBC q12h  -patient and family to decided to be anticoagulated with coumadin

## 2019-02-28 NOTE — PLAN OF CARE
Ms. Leija has HOCM with EUFEMIA and ROSAURA.  We have increased her medical therapy to help with her symptoms.  I have also asked [facility] in [place] to upload her coronary angiogram so we can review it.  Lastly, we have asked CT surgery to assess her risk for surgical myectomy.      If she fails medical therapy and is inoperable or high risk for surgery we will be able to offer her alcohol septal ablation in the future.

## 2019-02-28 NOTE — PLAN OF CARE
Problem: Physical Therapy Goal  Goal: Physical Therapy Goal  Goals to be met by: 3/6/2019     Patient will increase functional independence with mobility by performin. Supine to sit with Set-up [place]  2. Sit to supine with Set-up [place]  3. Sit to stand transfer with Contact Guard Assistance - Met       Revised: Sit to stand transfer with Supervision - Not Met  4. Bed to chair transfer with Contact Guard Assistance using Rolling Walker - Met      Revised: Bed to chair transfer with Supervision using Rolling Walker - Not Met  5. Gait  x 75 feet with Contact Guard Assistance using Rolling Walker. - Met      Revised: Gait  x 200 feet with Supervision using Rolling Walker. - Not Met        Discharge Recommendations: HH PT    Pt safe to transfer and amb with RN/PCT: Use rollator with SBA.    Goals remain appropriate.     Estelle Ramos, PTA.   [phone]   2019

## 2019-02-28 NOTE — ASSESSMENT & PLAN NOTE
- Hx of RML embolus diagnosed 12/2018  - h/h down trending 6.9 (8.5 on admission)  - discontinued lovenox   - discussed with pulmonology and recommend starting on heparin drip to see if she tolerates, and then started on Xarelto this AM as patient did not tolerate Eliquis  -patient currently on a heparin ggt as she had a drop of her hg on xarelto    Plan:  -continue inpatient low dose heparin ggt until therapeutic with coumadin

## 2019-02-28 NOTE — ASSESSMENT & PLAN NOTE
- this is a chronic issue that is most likely exacerbating her SOB  - pulmonary artery systolic pressure (PASP)= 49  - Mean PAP can be approximated because mPAP = 0.61sPAP + 2  - Mean PAP= 31.89 (32)

## 2019-02-28 NOTE — SUBJECTIVE & OBJECTIVE
Interval History: NAEON. Remains on lasix ggt. Patient had unmeasured UOP as drain was not attached properly to suctioning container. Patient has decided to be anticoagulated with warfarin; will bridge with heparin.    Review of Systems   Constitutional: Positive for appetite change and fatigue. Negative for chills and fever.   HENT: Negative for sore throat and trouble swallowing.    Eyes: Negative for pain and visual disturbance.   Respiratory: Positive for cough (dry), chest tightness and shortness of breath (on exertion).    Cardiovascular: Negative for chest pain and palpitations.   Gastrointestinal: Negative for abdominal pain, blood in stool and nausea.   Genitourinary: Negative for difficulty urinating and dysuria.   Musculoskeletal: Negative for arthralgias and gait problem.   Neurological: Negative for dizziness and headaches.   Psychiatric/Behavioral: Negative for confusion. The patient is not nervous/anxious.      Objective:     Vital Signs (Most Recent):  Temp: 98.2 °F (36.8 °C) (03/01/19 0804)  Pulse: 62 (03/01/19 0849)  Resp: 20 (03/01/19 0849)  BP: (!) 114/51 (03/01/19 0804)  SpO2: (!) 91 % (03/01/19 0804) Vital Signs (24h Range):  Temp:  [97.2 °F (36.2 °C)-98.6 °F (37 °C)] 98.2 °F (36.8 °C)  Pulse:  [52-88] 62  Resp:  [14-24] 20  SpO2:  [91 %-99 %] 91 %  BP: ()/(51-63) 114/51     Weight: 89.5 kg (197 lb 5 oz)  Body mass index is 32.83 kg/m².    Intake/Output Summary (Last 24 hours) at 3/1/2019 1046  Last data filed at 2/28/2019 2143  Gross per 24 hour   Intake 240 ml   Output 400 ml   Net -160 ml      Physical Exam   Constitutional: She is oriented to person, place, and time. She appears well-developed. No distress.   HENT:   Head: Normocephalic and atraumatic.   Neck: Neck supple. No tracheal deviation present.   Cardiovascular: Normal rate, regular rhythm and intact distal pulses.   Murmur heard.   Systolic murmur is present.  Pulmonary/Chest: She has decreased breath sounds in the right  lower field. She has wheezes (mild diffuse wheezes). She has rales (bibasilar).   On 3.5L nasal cannula, with SpO2 96%   Abdominal: Soft. She exhibits no distension.   Musculoskeletal: She exhibits edema (trace, bilateral lower extremities). She exhibits no deformity.   Neurological: She is alert and oriented to person, place, and time.   Skin: Skin is warm and dry.   Nursing note and vitals reviewed.      Significant Labs:   CBC:   Recent Labs   Lab 02/27/19  1811 02/28/19  0814 02/28/19  2049   WBC 8.16  8.16 7.85 9.32   HGB 8.0*  8.0* 8.1* 8.0*   HCT 25.9*  25.9* 25.9* 25.8*     274 277 301     CMP:   Recent Labs   Lab 02/28/19  0814 03/01/19  0518    136   K 4.3 4.1    99   CO2 26 29   * 126*   BUN 28* 34*   CREATININE 0.8 1.0   CALCIUM 9.5 9.5   PROT 6.4 6.3   ALBUMIN 2.7* 2.8*   BILITOT 0.5 0.4   ALKPHOS 89 84   AST 20 17   ALT 25 25   ANIONGAP 9 8   EGFRNONAA >60.0 53.7*       Significant Imaging: I have reviewed all pertinent imaging results/findings within the past 24 hours.

## 2019-03-01 PROBLEM — J96.21 ACUTE ON CHRONIC RESPIRATORY FAILURE WITH HYPOXIA: Status: ACTIVE | Noted: 2019-02-19

## 2019-03-01 LAB
ALBUMIN SERPL BCP-MCNC: 2.8 G/DL
ALP SERPL-CCNC: 84 U/L
ALT SERPL W/O P-5'-P-CCNC: 25 U/L
ANION GAP SERPL CALC-SCNC: 8 MMOL/L
APTT BLDCRRT: 28.3 SEC
APTT BLDCRRT: 32.5 SEC
APTT BLDCRRT: 38.7 SEC
AST SERPL-CCNC: 17 U/L
BASOPHILS # BLD AUTO: 0.07 K/UL
BASOPHILS # BLD AUTO: 0.07 K/UL
BASOPHILS NFR BLD: 0.8 %
BASOPHILS NFR BLD: 0.8 %
BILIRUB SERPL-MCNC: 0.4 MG/DL
BUN SERPL-MCNC: 34 MG/DL
CALCIUM SERPL-MCNC: 9.5 MG/DL
CHLORIDE SERPL-SCNC: 99 MMOL/L
CO2 SERPL-SCNC: 29 MMOL/L
CREAT SERPL-MCNC: 1 MG/DL
DIFFERENTIAL METHOD: ABNORMAL
DIFFERENTIAL METHOD: ABNORMAL
EOSINOPHIL # BLD AUTO: 0.6 K/UL
EOSINOPHIL # BLD AUTO: 0.7 K/UL
EOSINOPHIL NFR BLD: 6.4 %
EOSINOPHIL NFR BLD: 7.3 %
ERYTHROCYTE [DISTWIDTH] IN BLOOD BY AUTOMATED COUNT: 15.8 %
ERYTHROCYTE [DISTWIDTH] IN BLOOD BY AUTOMATED COUNT: 15.9 %
EST. GFR  (AFRICAN AMERICAN): >60 ML/MIN/1.73 M^2
EST. GFR  (NON AFRICAN AMERICAN): 53.7 ML/MIN/1.73 M^2
GLUCOSE SERPL-MCNC: 126 MG/DL
HCT VFR BLD AUTO: 24.8 %
HCT VFR BLD AUTO: 24.8 %
HGB BLD-MCNC: 7.7 G/DL
HGB BLD-MCNC: 7.7 G/DL
IMM GRANULOCYTES # BLD AUTO: 0.13 K/UL
IMM GRANULOCYTES # BLD AUTO: 0.27 K/UL
IMM GRANULOCYTES NFR BLD AUTO: 1.4 %
IMM GRANULOCYTES NFR BLD AUTO: 2.9 %
INR PPP: 1
LYMPHOCYTES # BLD AUTO: 1 K/UL
LYMPHOCYTES # BLD AUTO: 1.3 K/UL
LYMPHOCYTES NFR BLD: 10.5 %
LYMPHOCYTES NFR BLD: 14 %
MAGNESIUM SERPL-MCNC: 2 MG/DL
MCH RBC QN AUTO: 28.7 PG
MCH RBC QN AUTO: 28.9 PG
MCHC RBC AUTO-ENTMCNC: 31 G/DL
MCHC RBC AUTO-ENTMCNC: 31 G/DL
MCV RBC AUTO: 93 FL
MCV RBC AUTO: 93 FL
MONOCYTES # BLD AUTO: 0.7 K/UL
MONOCYTES # BLD AUTO: 1 K/UL
MONOCYTES NFR BLD: 10.2 %
MONOCYTES NFR BLD: 7.8 %
NEUTROPHILS # BLD AUTO: 6.1 K/UL
NEUTROPHILS # BLD AUTO: 6.7 K/UL
NEUTROPHILS NFR BLD: 64.8 %
NEUTROPHILS NFR BLD: 73.1 %
NRBC BLD-RTO: 0 /100 WBC
NRBC BLD-RTO: 0 /100 WBC
PLATELET # BLD AUTO: 294 K/UL
PLATELET # BLD AUTO: 316 K/UL
PMV BLD AUTO: 10.4 FL
PMV BLD AUTO: 10.4 FL
POCT GLUCOSE: 136 MG/DL (ref 70–110)
POCT GLUCOSE: 142 MG/DL (ref 70–110)
POCT GLUCOSE: 192 MG/DL (ref 70–110)
POCT GLUCOSE: 204 MG/DL (ref 70–110)
POTASSIUM SERPL-SCNC: 4.1 MMOL/L
PROT SERPL-MCNC: 6.3 G/DL
PROTHROMBIN TIME: 10.2 SEC
RBC # BLD AUTO: 2.66 M/UL
RBC # BLD AUTO: 2.68 M/UL
SODIUM SERPL-SCNC: 136 MMOL/L
WBC # BLD AUTO: 9.12 K/UL
WBC # BLD AUTO: 9.33 K/UL

## 2019-03-01 PROCEDURE — 11000001 HC ACUTE MED/SURG PRIVATE ROOM

## 2019-03-01 PROCEDURE — 99233 SBSQ HOSP IP/OBS HIGH 50: CPT | Mod: ,,, | Performed by: HOSPITALIST

## 2019-03-01 PROCEDURE — 25000242 PHARM REV CODE 250 ALT 637 W/ HCPCS: Performed by: STUDENT IN AN ORGANIZED HEALTH CARE EDUCATION/TRAINING PROGRAM

## 2019-03-01 PROCEDURE — 25000003 PHARM REV CODE 250: Performed by: STUDENT IN AN ORGANIZED HEALTH CARE EDUCATION/TRAINING PROGRAM

## 2019-03-01 PROCEDURE — 97535 SELF CARE MNGMENT TRAINING: CPT

## 2019-03-01 PROCEDURE — 99900035 HC TECH TIME PER 15 MIN (STAT)

## 2019-03-01 PROCEDURE — 99233 PR SUBSEQUENT HOSPITAL CARE,LEVL III: ICD-10-PCS | Mod: ,,, | Performed by: HOSPITALIST

## 2019-03-01 PROCEDURE — 94660 CPAP INITIATION&MGMT: CPT

## 2019-03-01 PROCEDURE — 85025 COMPLETE CBC W/AUTO DIFF WBC: CPT | Mod: 91

## 2019-03-01 PROCEDURE — 83735 ASSAY OF MAGNESIUM: CPT

## 2019-03-01 PROCEDURE — 94761 N-INVAS EAR/PLS OXIMETRY MLT: CPT

## 2019-03-01 PROCEDURE — 85610 PROTHROMBIN TIME: CPT

## 2019-03-01 PROCEDURE — 27000221 HC OXYGEN, UP TO 24 HOURS

## 2019-03-01 PROCEDURE — 63600175 PHARM REV CODE 636 W HCPCS: Performed by: HOSPITALIST

## 2019-03-01 PROCEDURE — 25000003 PHARM REV CODE 250: Performed by: INTERNAL MEDICINE

## 2019-03-01 PROCEDURE — 94640 AIRWAY INHALATION TREATMENT: CPT

## 2019-03-01 PROCEDURE — 25000003 PHARM REV CODE 250: Performed by: HOSPITALIST

## 2019-03-01 PROCEDURE — 85730 THROMBOPLASTIN TIME PARTIAL: CPT | Mod: 91

## 2019-03-01 PROCEDURE — 80053 COMPREHEN METABOLIC PANEL: CPT

## 2019-03-01 RX ORDER — ALPRAZOLAM 0.5 MG/1
0.5 TABLET ORAL EVERY 8 HOURS PRN
Status: DISCONTINUED | OUTPATIENT
Start: 2019-03-01 | End: 2019-03-08 | Stop reason: HOSPADM

## 2019-03-01 RX ADMIN — LEVALBUTEROL 1.25 MG: 1.25 SOLUTION, CONCENTRATE RESPIRATORY (INHALATION) at 07:03

## 2019-03-01 RX ADMIN — LEVOTHYROXINE SODIUM 112 MCG: 112 TABLET ORAL at 07:03

## 2019-03-01 RX ADMIN — LEVALBUTEROL 1.25 MG: 1.25 SOLUTION, CONCENTRATE RESPIRATORY (INHALATION) at 03:03

## 2019-03-01 RX ADMIN — INSULIN ASPART 1 UNITS: 100 INJECTION, SOLUTION INTRAVENOUS; SUBCUTANEOUS at 10:03

## 2019-03-01 RX ADMIN — WARFARIN SODIUM 5 MG: 5 TABLET ORAL at 04:03

## 2019-03-01 RX ADMIN — HYDROCODONE BITARTRATE AND ACETAMINOPHEN 1 TABLET: 10; 325 TABLET ORAL at 08:03

## 2019-03-01 RX ADMIN — MONTELUKAST SODIUM 10 MG: 10 TABLET, FILM COATED ORAL at 10:03

## 2019-03-01 RX ADMIN — ACETYLCYSTEINE 4 ML: 100 SOLUTION ORAL; RESPIRATORY (INHALATION) at 04:03

## 2019-03-01 RX ADMIN — ACETYLCYSTEINE 4 ML: 100 SOLUTION ORAL; RESPIRATORY (INHALATION) at 07:03

## 2019-03-01 RX ADMIN — HYDROCODONE BITARTRATE AND ACETAMINOPHEN 1 TABLET: 10; 325 TABLET ORAL at 02:03

## 2019-03-01 RX ADMIN — FLUTICASONE PROPIONATE 50 MCG: 50 SPRAY, METERED NASAL at 08:03

## 2019-03-01 RX ADMIN — TIOTROPIUM BROMIDE 18 MCG: 18 CAPSULE ORAL; RESPIRATORY (INHALATION) at 08:03

## 2019-03-01 RX ADMIN — VERAPAMIL HYDROCHLORIDE 240 MG: 240 TABLET, FILM COATED, EXTENDED RELEASE ORAL at 10:03

## 2019-03-01 RX ADMIN — METOPROLOL SUCCINATE 100 MG: 100 TABLET, EXTENDED RELEASE ORAL at 08:03

## 2019-03-01 RX ADMIN — HEPARIN SODIUM AND DEXTROSE 19 UNITS/KG/HR: 10000; 5 INJECTION INTRAVENOUS at 01:03

## 2019-03-01 RX ADMIN — GABAPENTIN 100 MG: 100 CAPSULE ORAL at 08:03

## 2019-03-01 RX ADMIN — ALPRAZOLAM 0.5 MG: 0.5 TABLET ORAL at 09:03

## 2019-03-01 RX ADMIN — PANTOPRAZOLE SODIUM 40 MG: 40 TABLET, DELAYED RELEASE ORAL at 08:03

## 2019-03-01 RX ADMIN — GABAPENTIN 100 MG: 100 CAPSULE ORAL at 10:03

## 2019-03-01 RX ADMIN — ACETYLCYSTEINE 4 ML: 100 SOLUTION ORAL; RESPIRATORY (INHALATION) at 08:03

## 2019-03-01 RX ADMIN — HYDROCODONE BITARTRATE AND ACETAMINOPHEN 1 TABLET: 10; 325 TABLET ORAL at 09:03

## 2019-03-01 RX ADMIN — LEVALBUTEROL 1.25 MG: 1.25 SOLUTION, CONCENTRATE RESPIRATORY (INHALATION) at 08:03

## 2019-03-01 NOTE — PLAN OF CARE
Problem: Adult Inpatient Plan of Care  Goal: Plan of Care Review    Recommendations     Recommendation/Intervention:   1. Continue current cardiac diet and encourage PO intake. Intake fair at this time.   2. Coumadin education provided.   3. RD following.     Goals: Intake >/=85% EEN/EPN  Nutrition Goal Status: new

## 2019-03-01 NOTE — PLAN OF CARE
Problem: Occupational Therapy Goal  Goal: Occupational Therapy Goal  Goals to be met by: 3/21/2019    Patient will increase functional independence with ADLs by performing:    Toileting from toilet with Minimal Assistance for hygiene and clothing management. MET 3/1  Stand pivot transfers with Minimal Assistance. Met 2/25  Squat pivot transfers with Minimal Assistance. Met 2/25  Toilet transfer to bedside commode with Contact Guard Assistance. MET 3/1  Bathing from  standing at sink with Minimal Assistance.  LE dressing Supervision with AD to don B socks, pants, and underpants.   Outcome: Ongoing (interventions implemented as appropriate)  Pt progressing well towards goals and has met 4/5 goals. Goals revised to maximize functional independence.

## 2019-03-01 NOTE — PT/OT/SLP PROGRESS
"Occupational Therapy   Treatment    Name: Makenzie Leija  MRN: [id number]  Admitting Diagnosis:  Acute on chronic respiratory failure with hypoxia  4 Days Post-Op    Recommendations:     Discharge Recommendations: (HHOT)  Discharge Equipment Recommendations:  none  Barriers to discharge:  None    Assessment:     Makenzie Leija is a 78 y.o. female with a medical diagnosis of Acute on chronic respiratory failure with hypoxia.  She presents with improved independence in toileing t/f and toileting task with hygiene and clothing management. Performance deficits affecting function are weakness, impaired endurance, impaired self care skills, impaired functional mobilty, impaired cardiopulmonary response to activity, gait instability.     Rehab Prognosis:  Good; patient would benefit from acute skilled OT services to address these deficits and reach maximum level of function.       Plan:     Patient to be seen 3 x/week to address the above listed problems via self-care/home management, therapeutic activities, therapeutic exercises  · Plan of Care Expires: 03/21/19  · Plan of Care Reviewed with: patient, daughter    Subjective     Pain/Comfort:  Pain Rating 1: 5/10  Location - Side 1: Bilateral  Location 1: foot(pt report "neuropathy")  Pain Addressed 1: Reposition, Distraction, Cessation of Activity  Pain Rating Post-Intervention 1: (did not rate)  Pain Rating 2: 5/10  Location - Orientation 2: lower  Location 2: back  Pain Addressed 2: Reposition, Distraction, Cessation of Activity  Pain Rating Post-Intervention 2: (did not rate)    Objective:     Communicated with: nsg prior to session.  Patient found up in chair with telemetry, PureWick, oxygen, peripheral IV upon OT entry to room.    General Precautions: Standard, fall, respiratory   Orthopedic Precautions:N/A   Braces: N/A     Occupational Performance:     Functional Mobility/Transfers:  · Patient completed Sit <> Stand Transfer with supervision  with  rollator "   · Patient completed Toilet Transfer Step Transfer technique with supervision with  rollator, with assist for IV pole and O2 tank  · Functional Mobility: Pt with fair + to good dynamic standing balance in short t/f to toilet. Pt demo'd mildly increased SOB as pt returning to chair after grooming.    Activities of Daily Living:  · Grooming: supervision in stance at sink to wash hands  · Toileting: supervision for clothing mgmt and hygiene at toilet in room      [facility] 6 Click ADL: 17    Treatment & Education:  Pt performing skills as above. Pt educated on energy conservation for ADLs and pt expressed interest in AD for LE dressing tasks. Pt required increased time to recover from SOB after seated in bed side chair.    Patient left up in chair with all lines intact, call button in reach, nsg notified and daughter and RN  presentEducation:      GOALS:   Multidisciplinary Problems     Occupational Therapy Goals        Problem: Occupational Therapy Goal    Goal Priority Disciplines Outcome Interventions   Occupational Therapy Goal     OT, PT/OT Ongoing (interventions implemented as appropriate)    Description:  Goals to be met by: 3/21/2019    Patient will increase functional independence with ADLs by performing:    Toileting from toilet with Minimal Assistance for hygiene and clothing management. MET 3/1  Stand pivot transfers with Minimal Assistance. Met 2/25  Squat pivot transfers with Minimal Assistance. Met 2/25  Toilet transfer to bedside commode with Contact Guard Assistance. MET 3/1  Bathing from  standing at sink with Minimal Assistance.  LE dressing Supervision with AD to don B socks, pants, and underpants.                     Time Tracking:     OT Date of Treatment: 03/01/19  OT Start Time: 1118  OT Stop Time: 1138  OT Total Time (min): 20 min    Billable Minutes:Self Care/Home Management 20    Wilma Cristobal OT  3/1/2019

## 2019-03-01 NOTE — CONSULTS
"[facility]  Adult Nutrition  Progress Note    SUMMARY       Recommendations    Recommendation/Intervention:   1. Continue current cardiac diet and encourage PO intake. Intake fair at this time.   2. Coumadin education provided.   3. RD following.    Goals: Intake >/=85% EEN/EPN  Nutrition Goal Status: new  Communication of RD Recs: (POC)    Reason for Assessment    Reason For Assessment: consult, length of stay  Diagnosis: (acute on chronic respiratory failure)  Relevant Medical History: Afib, cancer, COPD, CAD, HTN, T2DM, HLD    General Information Comments: Pt transferred from OSH, found to have acute HF with pulmonary edema and BL PE. Pt and daughter at bedside. Report pt has been hospitalized on and off since 11/2018 and pt's intake has declined over this time due to getting tired of hospital food. Eating <25% of meals per pt's report though RN documentation shows intake from % of meals. Boost ordered. Coumadin education provided and pt/daughter voiced understanding. Following low salt diet. Appears nourished.    Nutrition Discharge Planning: Adequate PO intake on cardiac diet    Nutrition Risk Screen    Nutrition Risk Screen: no indicators present    Nutrition/Diet History    Patient Reported Diet/Restrictions/Preferences: low salt  Spiritual, Cultural Beliefs, Sikh Practices, Values that Affect Care: no  Factors Affecting Nutritional Intake: decreased appetite(food preferences)    Anthropometrics    Temp: 98.2 °F (36.8 °C)  Height Method: Stated  Height: 5' 5" (165.1 cm)  Height (inches): 65 in  Weight Method: Bed Scale  Weight: 89.5 kg (197 lb 5 oz)  Weight (lb): 197.31 lb  Ideal Body Weight (IBW), Female: 125 lb  % Ideal Body Weight, Female (lb): 159.79 lb  BMI (Calculated): 33.3  BMI Grade: 30 - 34.9- obesity - grade I       Lab/Procedures/Meds    Pertinent Labs Reviewed: reviewed  Pertinent Labs Comments: BUN 34, Glu 126, Alb 2.8  Pertinent Medications Reviewed: " reviewed  Pertinent Medications Comments: docusate, levothyroxine, pantoprazole, warfarin, lasix, heparin    Estimated/Assessed Needs    Weight Used For Calorie Calculations: 89.5 kg (197 lb 5 oz)  Energy Calorie Requirements (kcal): 1714  Energy Need Method: Cheatham-St Jeor(x 1.25 (PAL))  Protein Requirements:  gm (1.0-1.2 gm/kg)  Weight Used For Protein Calculations: 89.5 kg (197 lb 5 oz)  Fluid Requirements (mL): per MD     RDA Method (mL): 1714       Nutrition Prescription Ordered    Current Diet Order: Cardiac  Nutrition Order Comments: 1500 ml FR  Oral Nutrition Supplement: Boost Plus    Evaluation of Received Nutrient/Fluid Intake    Comments: LBM 3/1 x 2  % Intake of Estimated Energy Needs: 25 - 50 %  % Meal Intake: 25 - 50 %    Nutrition Risk    Level of Risk/Frequency of Follow-up: low     Assessment and Plan    Nutrition Problem  Inadequate energy intake    Related to (etiology):   Prolonged hospitalization    Signs and Symptoms (as evidenced by):   Meal intake <50%     Recommendations (treatment strategy):  Commercial beverage    Nutrition Diagnosis Status:   New    Monitor and Evaluation    Food and Nutrient Intake: energy intake, food and beverage intake  Food and Nutrient Adminstration: diet order  Knowledge/Beliefs/Attitudes: food and nutrition knowledge/skill  Anthropometric Measurements: weight, weight change, body mass index  Biochemical Data, Medical Tests and Procedures: electrolyte and renal panel, gastrointestinal profile, glucose/endocrine profile, inflammatory profile  Nutrition-Focused Physical Findings: overall appearance     Nutrition Follow-Up    RD Follow-up?: Yes

## 2019-03-01 NOTE — PLAN OF CARE
Problem: Adult Inpatient Plan of Care  Goal: Plan of Care Review  AAOx4. Denies pain or discomfort at this time. No s/s of hypo/hyperglycemia noted. Daughther remains at bedise. Remains on continuous fluids. Free of falls and injuries per shift. Safety measures met. Will continue to monir

## 2019-03-01 NOTE — ASSESSMENT & PLAN NOTE
- Thoracentesis 2/8 consistent w/ transudate (OSH studies)  - Thoracentesis 12/2018 cytology negative for malignancy  - Bilateral effusions notes on CXR     Plan  -continue patient on lasix ggt  -monitor ins and outs closely (as pattients UOP was not able to be calculated yesterday due to purewick malfunction)  -will monitor closely as patient is pre-load depended (given her HOCM)

## 2019-03-01 NOTE — ASSESSMENT & PLAN NOTE
- continue LAMA/ICS/LABA    Plan:  -appears at baseline  -will switch duo nebs to levalbuterol neb solution  -will continue to monitor closely and will d/c with follow up with Pulmonology

## 2019-03-01 NOTE — PT/OT/SLP PROGRESS
Physical Therapy      Patient Name:  Makenzie SCHWARTZ Leija   MRN:  [id number]    Patient not seen today. Per chart review pt remains appropriate for therapy services and is tentatively scheduled for treatment 3/4. Will follow-up per POC as appropriate.     Arya Schaeffer, PTA

## 2019-03-01 NOTE — PROGRESS NOTES
[facility] Hospital Medicine  Progress Note    Patient Name: Makenzie Leija  MRN: [id number]  Patient Class: IP- Inpatient   Admission Date: 2/19/2019  Length of Stay: 10 days  Attending Physician: Lena Jamison MD  Primary Care Provider: Karthik Roth MD    Hospital Medicine Team: [facility] Cristy Verdin MD    Subjective:     Principal Problem:Acute on chronic respiratory failure with hypoxia    HPI:  Ms. 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      Per OSH records:  Pt presented on 2/5/29 via EMS for respiratory distress despite supplemental oxygen. Pt reports increased SOB over the last 4-5 days. On the day of admission significant SOB despite inhale and changing from oxygen compressor to tank. SpO2 75% on 3.5 L at home. Triage noted patient's SpO2 69% on 3L NC. Patient was placed on BiPaP w/ improvement in her respiratory function. CTA chest 2/7/19: without evidence of PE; moderate bilateral pleural effusions; RML consolidation. Thoracentesis 2/9/19: consistent with transudate. (thoracentesis 12/18 negative cytology). Patient was treated w/ antibiotics. Echo December 2018, IHSS, elevated peak gradient across LVOT at 179mmHg and a mean of 94mmHg. FELICIA performed. Patient remained in the hospital till 2/19/19; during which she was diuresed ~15L, started on BB yet remained symptomatic. And decision was made to transfer patient to [facility].     Ms. 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      Per OSH records:  Pt presented on 2/5/29 via EMS for respiratory distress despite supplemental oxygen. Pt reports increased SOB over the last 4-5 days. On the day of admission significant SOB despite inhale and changing from oxygen compressor to tank. SpO2 75% on 3.5 L at home. Triage noted patient's SpO2 69% on 3L NC. Patient was placed on BiPaP w/ improvement in her respiratory function. CTA chest 2/7/19: without evidence of PE; moderate bilateral pleural effusions; RML consolidation. Thoracentesis 2/9/19: consistent with transudate. (thoracentesis 12/18 negative cytology). Patient was treated w/ antibiotics. Echo December 2018, IHSS, elevated peak gradient across LVOT at 179mmHg and a mean of 94mmHg. FELICIA performed. Patient remained in the hospital till 2/19/19; during which she was diuresed ~15L, started on BB yet remained symptomatic. And decision was made to transfer patient to [facility].             Hospital Course:  Transferred to [facility] for higher level of cardiac care. Admitted to [facility] for acute diastolic heart failure HOCM with pulmonary edema and bilateral pleural effusions. Patient admitted 2/19/19. 2D echo repeated; septal size 2.2cm, ROSAURA with severe outflow tract left ventricular obstruction. The peak gradient is near 100mm Hg. Patient started on BB and CCB. Will need ablation as an outpatient.     2/21/19 patient h/h noted to be 7 down from 8.5 and subcutaneous ecchymosis noted around her lower abdomen. Lovenox stopped and patient was transferred to hospital medicine. EGD on 2/25/19 showed no acute findings, and no source of bleed.   2/26/19, patients Hg dropped to 6.9 and she received her second blood transfusion.  Patient started on heparin ggt.   2/27/19.  Patient had episode of dyspnea overnight with sats remaining >88. On Heparin ggt for bleeding with adequate hg trends. LE US showed no DVTs.  CXR showed increased pulmonary edema and R sided effusion appears increased. US of abdomen showed no ascities. Interventional cardiology came to assess patient in light of HOCM and being +symptomatic.   2/28/19: Patient desatted overnight, requiring 6L of O2 for SpO2 88-90%.  Patient with pitting edema and CXR that showed fluid, given 20Lasix and responded well (-2L). Patient had a nose bleed overnight and coughed a small amount of blood. Heparin ggt was stopped temporarily and restarted AM. Patient started on lasix ggt.      No new subjective & objective note has been filed under this hospital service since the last note was generated.    Assessment/Plan:      * Acute on chronic respiratory failure with hypoxia    - Patient acute respiratory failure is multifactorial secondary to: moderate COPD, pulmonary HTN (PA 49), HOCM, PE, MARISOL (CPAP at 4), bilateral pulmonary effusion, hx of RUL cancer s/p chemo&XRT, recently tx for CAP zosyn x 12 days   - treated for PNA at OSH, no signs of infection. No further txt required   - per OSH records patient not tolerating CPAP at night, would get hypoxic and was switched over to BiPAP  - pulmonary consulted; appreciate assistance but do not feel there is anything else to offer her inpatient  -patient continues to experience increasing dyspnea. KUB and Abdo US imaging show no GI etiology of abdominal distention, likely fluid.  CXR shows increasing pulmonary edema and R sided effusion  -patient received 20IV lasix last night and tolerated well (UOP, 2L)    Plan  -continue lasix ggt, measure ins and outs more closely  - will continue BiPAP qhs  -patient will need BiPAP on discharge (order placed)  - wean O2 as  able  -on discharge will need to have follow up with pulm and interventional cardiology         Acute blood loss anemia    - Patient w/ a history of ITP, bleeding diathesis, daughter with hemophilia B (implying pt is carrier), hx of bleeding.   - has been evaluated by hem/onc as outpatient and all workup was normal   - s/p 1 unit PRBCs  - hemoglobin 6.9 this a.m.  - EGD 2/25 showed a Normal esophagus, stomach and duodenum.    Plan:  - Use a proton pump inhibitor PO daily prophylactically since patient will be on heparin  -will continue to monitor patient with CBC q12h  -patient and family to decided to be anticoagulated with coumadin       Hypothyroidism    -patient has reported hypothyroidism  -TSH wnl    Plan  - continue home dose synthroid        Pulmonary HTN    - this is a chronic issue that is most likely exacerbating her SOB  - pulmonary artery systolic pressure (PASP)= 49  - Mean PAP can be approximated because mPAP = 0.61sPAP + 2  - Mean PAP= 31.89 (32)         HOCM (hypertrophic obstructive cardiomyopathy)    - Hypertrophic cardiomyopathy with asymmetric septal hypertrophy  - Systolic anterior motion of the mitral valve with severe outflow tract left ventricular obstruction. The peak gradient is near 100mm Hg.    Plan:  -Interventional cardiology saw patient and increased her medications to help with her symptoms, lower HR and increase ventricular filling time.   - will need outpatient interventional cardiology follow-up for ablation        Type 2 diabetes mellitus    -on Metformin at home and has great control (A1c was 5.6 on 2/19/19)  -patient has not required any SSI in last 48hrs  -BG remains in goal range    Plan  - Low dose insulin sliding scale   - bedside glucose monitoring         Pleural effusion, bilateral    - Thoracentesis 2/8 consistent w/ transudate (OSH studies)  - Thoracentesis 12/2018 cytology negative for malignancy  - Bilateral effusions notes on CXR     Plan  -continue patient on  lasix ggt  -monitor ins and outs closely (as pattients UOP was not able to be calculated yesterday due to purewick malfunction)  -will monitor closely as patient is pre-load depended (given her HOCM)       Chronic pulmonary embolism    - Hx of RML embolus diagnosed 12/2018  - h/h down trending 6.9 (8.5 on admission)  - discontinued lovenox   - discussed with pulmonology and recommend starting on heparin drip to see if she tolerates, and then started on Xarelto this AM as patient did not tolerate Eliquis  -patient currently on a heparin ggt as she had a drop of her hg on xarelto    Plan:  -continue inpatient low dose heparin ggt until therapeutic with coumadin     Acute on chronic diastolic congestive heart failure, NYHA class 3      Echo 2/19/2019  · Hypertrophic cardiomyopathy with asymmetric septal hypertrophy  · Normal left ventricular systolic function. The estimated ejection fraction is 65%  · Normal right ventricular systolic function.  · Severe left atrial enlargement.  · Systolic anterior motion of the mitral valve with severe outflow tract left ventricular obstruction. The peak gradient is near 100mm Hg.  · The estimated PA systolic pressure is 49 mm Hg  · Intermediate central venous pressure (8 mm Hg).     Plan:  - will hold off on diuresis; pt is preload dependent   -switched to long acting metoprolol succinate 50 mg and Verapamil 240 (from 80) daily       COPD (chronic obstructive pulmonary disease)    - continue LAMA/ICS/LABA    Plan:  -appears at baseline  -will switch duo nebs to levalbuterol neb solution  -will continue to monitor closely and will d/c with follow up with Pulmonology         VTE Risk Mitigation (From admission, onward)        Ordered     warfarin (COUMADIN) tablet 5 mg  Daily      02/28/19 1800     heparin 25,000 units in dextrose 5% 250 ml (100 units/mL) infusion MINIMAL INTENSITY nomogram - [facility]  Continuous      02/27/19 1345              Cristy Verdin MD  Department of Hospital  Medicine   [facility]

## 2019-03-02 LAB
ALBUMIN SERPL BCP-MCNC: 2.9 G/DL
ALP SERPL-CCNC: 91 U/L
ALT SERPL W/O P-5'-P-CCNC: 24 U/L
ANION GAP SERPL CALC-SCNC: 9 MMOL/L
APTT BLDCRRT: 39.8 SEC
AST SERPL-CCNC: 16 U/L
BASOPHILS # BLD AUTO: 0.06 K/UL
BASOPHILS NFR BLD: 0.6 %
BILIRUB SERPL-MCNC: 0.3 MG/DL
BUN SERPL-MCNC: 48 MG/DL
CALCIUM SERPL-MCNC: 9.4 MG/DL
CHLORIDE SERPL-SCNC: 97 MMOL/L
CO2 SERPL-SCNC: 29 MMOL/L
CREAT SERPL-MCNC: 1.2 MG/DL
DIFFERENTIAL METHOD: ABNORMAL
EOSINOPHIL # BLD AUTO: 0.5 K/UL
EOSINOPHIL NFR BLD: 5.1 %
ERYTHROCYTE [DISTWIDTH] IN BLOOD BY AUTOMATED COUNT: 15.8 %
EST. GFR  (AFRICAN AMERICAN): 49.7 ML/MIN/1.73 M^2
EST. GFR  (NON AFRICAN AMERICAN): 43.1 ML/MIN/1.73 M^2
GLUCOSE SERPL-MCNC: 130 MG/DL
HCT VFR BLD AUTO: 24.3 %
HGB BLD-MCNC: 7.4 G/DL
IMM GRANULOCYTES # BLD AUTO: 0.3 K/UL
IMM GRANULOCYTES NFR BLD AUTO: 2.9 %
INR PPP: 0.9
LYMPHOCYTES # BLD AUTO: 1 K/UL
LYMPHOCYTES NFR BLD: 10 %
MAGNESIUM SERPL-MCNC: 2.1 MG/DL
MCH RBC QN AUTO: 28.4 PG
MCHC RBC AUTO-ENTMCNC: 30.5 G/DL
MCV RBC AUTO: 93 FL
MONOCYTES # BLD AUTO: 0.8 K/UL
MONOCYTES NFR BLD: 7.3 %
NEUTROPHILS # BLD AUTO: 7.6 K/UL
NEUTROPHILS NFR BLD: 74.1 %
NRBC BLD-RTO: 0 /100 WBC
PLATELET # BLD AUTO: 305 K/UL
PMV BLD AUTO: 10.3 FL
POCT GLUCOSE: 193 MG/DL (ref 70–110)
POCT GLUCOSE: 246 MG/DL (ref 70–110)
POCT GLUCOSE: 263 MG/DL (ref 70–110)
POTASSIUM SERPL-SCNC: 4.3 MMOL/L
PROT SERPL-MCNC: 6.6 G/DL
PROTHROMBIN TIME: 9.7 SEC
RBC # BLD AUTO: 2.61 M/UL
SODIUM SERPL-SCNC: 135 MMOL/L
WBC # BLD AUTO: 10.31 K/UL

## 2019-03-02 PROCEDURE — 25000003 PHARM REV CODE 250: Performed by: STUDENT IN AN ORGANIZED HEALTH CARE EDUCATION/TRAINING PROGRAM

## 2019-03-02 PROCEDURE — 94761 N-INVAS EAR/PLS OXIMETRY MLT: CPT

## 2019-03-02 PROCEDURE — 80053 COMPREHEN METABOLIC PANEL: CPT

## 2019-03-02 PROCEDURE — 85730 THROMBOPLASTIN TIME PARTIAL: CPT

## 2019-03-02 PROCEDURE — 85025 COMPLETE CBC W/AUTO DIFF WBC: CPT

## 2019-03-02 PROCEDURE — 94799 UNLISTED PULMONARY SVC/PX: CPT

## 2019-03-02 PROCEDURE — 25000003 PHARM REV CODE 250: Performed by: HOSPITALIST

## 2019-03-02 PROCEDURE — 11000001 HC ACUTE MED/SURG PRIVATE ROOM

## 2019-03-02 PROCEDURE — 63600175 PHARM REV CODE 636 W HCPCS: Performed by: HOSPITALIST

## 2019-03-02 PROCEDURE — 27000221 HC OXYGEN, UP TO 24 HOURS

## 2019-03-02 PROCEDURE — 94660 CPAP INITIATION&MGMT: CPT

## 2019-03-02 PROCEDURE — 99232 SBSQ HOSP IP/OBS MODERATE 35: CPT | Mod: GC,,, | Performed by: HOSPITALIST

## 2019-03-02 PROCEDURE — 25000003 PHARM REV CODE 250: Performed by: INTERNAL MEDICINE

## 2019-03-02 PROCEDURE — 63600175 PHARM REV CODE 636 W HCPCS: Performed by: STUDENT IN AN ORGANIZED HEALTH CARE EDUCATION/TRAINING PROGRAM

## 2019-03-02 PROCEDURE — 99900035 HC TECH TIME PER 15 MIN (STAT)

## 2019-03-02 PROCEDURE — 27000646 HC AEROBIKA DEVICE

## 2019-03-02 PROCEDURE — 99232 PR SUBSEQUENT HOSPITAL CARE,LEVL II: ICD-10-PCS | Mod: GC,,, | Performed by: HOSPITALIST

## 2019-03-02 PROCEDURE — 36415 COLL VENOUS BLD VENIPUNCTURE: CPT

## 2019-03-02 PROCEDURE — 83735 ASSAY OF MAGNESIUM: CPT

## 2019-03-02 PROCEDURE — 25000242 PHARM REV CODE 250 ALT 637 W/ HCPCS: Performed by: STUDENT IN AN ORGANIZED HEALTH CARE EDUCATION/TRAINING PROGRAM

## 2019-03-02 PROCEDURE — 94664 DEMO&/EVAL PT USE INHALER: CPT

## 2019-03-02 PROCEDURE — 85610 PROTHROMBIN TIME: CPT

## 2019-03-02 PROCEDURE — 94640 AIRWAY INHALATION TREATMENT: CPT

## 2019-03-02 RX ORDER — FUROSEMIDE 10 MG/ML
40 INJECTION INTRAMUSCULAR; INTRAVENOUS ONCE
Status: COMPLETED | OUTPATIENT
Start: 2019-03-02 | End: 2019-03-02

## 2019-03-02 RX ADMIN — ACETYLCYSTEINE 4 ML: 100 SOLUTION ORAL; RESPIRATORY (INHALATION) at 12:03

## 2019-03-02 RX ADMIN — MONTELUKAST SODIUM 10 MG: 10 TABLET, FILM COATED ORAL at 10:03

## 2019-03-02 RX ADMIN — LEVALBUTEROL 1.25 MG: 1.25 SOLUTION, CONCENTRATE RESPIRATORY (INHALATION) at 12:03

## 2019-03-02 RX ADMIN — HEPARIN SODIUM AND DEXTROSE 19.94 UNITS/KG/HR: 10000; 5 INJECTION INTRAVENOUS at 10:03

## 2019-03-02 RX ADMIN — VERAPAMIL HYDROCHLORIDE 240 MG: 240 TABLET, FILM COATED, EXTENDED RELEASE ORAL at 10:03

## 2019-03-02 RX ADMIN — METOPROLOL SUCCINATE 100 MG: 100 TABLET, EXTENDED RELEASE ORAL at 09:03

## 2019-03-02 RX ADMIN — HYDROCODONE BITARTRATE AND ACETAMINOPHEN 1 TABLET: 10; 325 TABLET ORAL at 06:03

## 2019-03-02 RX ADMIN — HEPARIN SODIUM AND DEXTROSE 20 UNITS/KG/HR: 10000; 5 INJECTION INTRAVENOUS at 07:03

## 2019-03-02 RX ADMIN — ACETYLCYSTEINE 4 ML: 100 SOLUTION ORAL; RESPIRATORY (INHALATION) at 08:03

## 2019-03-02 RX ADMIN — LEVOTHYROXINE SODIUM 112 MCG: 112 TABLET ORAL at 06:03

## 2019-03-02 RX ADMIN — HYDROCODONE BITARTRATE AND ACETAMINOPHEN 1 TABLET: 10; 325 TABLET ORAL at 05:03

## 2019-03-02 RX ADMIN — PANTOPRAZOLE SODIUM 40 MG: 40 TABLET, DELAYED RELEASE ORAL at 09:03

## 2019-03-02 RX ADMIN — INSULIN ASPART 2 UNITS: 100 INJECTION, SOLUTION INTRAVENOUS; SUBCUTANEOUS at 11:03

## 2019-03-02 RX ADMIN — HYDROCODONE BITARTRATE AND ACETAMINOPHEN 1 TABLET: 10; 325 TABLET ORAL at 10:03

## 2019-03-02 RX ADMIN — FUROSEMIDE 40 MG: 10 INJECTION, SOLUTION INTRAMUSCULAR; INTRAVENOUS at 11:03

## 2019-03-02 RX ADMIN — GABAPENTIN 100 MG: 100 CAPSULE ORAL at 10:03

## 2019-03-02 RX ADMIN — LEVALBUTEROL 1.25 MG: 1.25 SOLUTION, CONCENTRATE RESPIRATORY (INHALATION) at 08:03

## 2019-03-02 RX ADMIN — WARFARIN SODIUM 5 MG: 5 TABLET ORAL at 05:03

## 2019-03-02 RX ADMIN — INSULIN ASPART 3 UNITS: 100 INJECTION, SOLUTION INTRAVENOUS; SUBCUTANEOUS at 08:03

## 2019-03-02 RX ADMIN — ALPRAZOLAM 0.5 MG: 0.5 TABLET ORAL at 10:03

## 2019-03-02 RX ADMIN — FLUTICASONE PROPIONATE 50 MCG: 50 SPRAY, METERED NASAL at 09:03

## 2019-03-02 RX ADMIN — GABAPENTIN 100 MG: 100 CAPSULE ORAL at 09:03

## 2019-03-02 RX ADMIN — TIOTROPIUM BROMIDE 18 MCG: 18 CAPSULE ORAL; RESPIRATORY (INHALATION) at 09:03

## 2019-03-02 NOTE — PLAN OF CARE
Problem: Adult Inpatient Plan of Care  Goal: Plan of Care Review  Outcome: Ongoing (interventions implemented as appropriate)  SR up x2, call bell in reach, bed in low locked position, skid proof socks on. Patient AAOx4. VS stable WNL. Patient complains of pain, medication administered per MAR. Heparin in therapeutic range. Patient remained free of fall and injuries during the shift.  Care plan explained to patient. No concerns, will continue to monitor.

## 2019-03-02 NOTE — ASSESSMENT & PLAN NOTE
- Patient acute respiratory failure is multifactorial secondary to: moderate COPD, pulmonary HTN (PA 49), HOCM, PE, MARISOL (CPAP at 4), bilateral pulmonary effusion, hx of RUL cancer s/p chemo&XRT, recently tx for CAP zosyn x 12 days   - treated for PNA at OSH, no signs of infection. No further txt required   - per OSH records patient not tolerating CPAP at night, would get hypoxic and was switched over to BiPAP  - pulmonary consulted; appreciate assistance but do not feel there is anything else to offer her inpatient  -patient continues to experience increasing dyspnea. KUB and Abdo US imaging show no GI etiology of abdominal distention, likely fluid.  CXR shows increasing pulmonary edema and R sided effusion  -patient received 20IV lasix 2/28 and tolerated well (UOP, 2L)  -2/28 switched to lasix ggt      Plan  -continue lasix but will trasition from ggt to IV push 40mg once today and, measure ins and outs closely  - will continue BiPAP qhs  -patient will need BiPAP on discharge (order placed)  - wean O2 as able  -on discharge will need to have follow up with pulm and interventional cardiology

## 2019-03-02 NOTE — SUBJECTIVE & OBJECTIVE
Interval History: NAEON. UOP -1.45L overnight. Continue anticoagulation with warfarin; bridge with heparin.    Review of Systems   Constitutional: Positive for appetite change and fatigue. Negative for chills and fever.   HENT: Negative for sore throat and trouble swallowing.    Eyes: Negative for pain and visual disturbance.   Respiratory: Positive for cough (dry), chest tightness and shortness of breath (on exertion).    Cardiovascular: Negative for chest pain and palpitations.   Gastrointestinal: Negative for abdominal pain, blood in stool and nausea.   Genitourinary: Negative for difficulty urinating and dysuria.   Musculoskeletal: Negative for arthralgias and gait problem.   Neurological: Negative for dizziness and headaches.   Psychiatric/Behavioral: Negative for confusion. The patient is not nervous/anxious.      Objective:     Vital Signs (Most Recent):  Temp: 98.4 °F (36.9 °C) (03/01/19 2104)  Pulse: 61 (03/02/19 0915)  Resp: 19 (03/02/19 0915)  BP: (!) 106/48 (03/02/19 0842)  SpO2: 100 % (03/02/19 0842) Vital Signs (24h Range):  Temp:  [98.2 °F (36.8 °C)-98.4 °F (36.9 °C)] 98.4 °F (36.9 °C)  Pulse:  [55-82] 61  Resp:  [14-23] 19  SpO2:  [93 %-100 %] 100 %  BP: ()/(48-61) 106/48     Weight: 89.5 kg (197 lb 5 oz)  Body mass index is 32.83 kg/m².    Intake/Output Summary (Last 24 hours) at 3/2/2019 1006  Last data filed at 3/2/2019 0600  Gross per 24 hour   Intake 600 ml   Output 1450 ml   Net -850 ml      Physical Exam   Constitutional: She is oriented to person, place, and time. She appears well-developed. No distress.   HENT:   Head: Normocephalic and atraumatic.   Neck: Neck supple. No tracheal deviation present.   Cardiovascular: Normal rate, regular rhythm and intact distal pulses.   Murmur heard.   Systolic murmur is present.  Pulmonary/Chest: She has decreased breath sounds in the right lower field. She has wheezes (mild diffuse wheezes).   On 3.5L nasal cannula, with SpO2 96%   Abdominal: Soft.  She exhibits no distension.   Musculoskeletal: She exhibits edema (trace, bilateral lower extremities). She exhibits no deformity.   Neurological: She is alert and oriented to person, place, and time.   Skin: Skin is warm and dry.   Nursing note and vitals reviewed.      Significant Labs:   CBC:   Recent Labs   Lab 02/28/19  2049 03/01/19  1034 03/01/19  2110   WBC 9.32 9.12 9.33   HGB 8.0* 7.7* 7.7*   HCT 25.8* 24.8* 24.8*    294 316     CMP:   Recent Labs   Lab 03/01/19  0518 03/02/19  0610    135*   K 4.1 4.3   CL 99 97   CO2 29 29   * 130*   BUN 34* 48*   CREATININE 1.0 1.2   CALCIUM 9.5 9.4   PROT 6.3 6.6   ALBUMIN 2.8* 2.9*   BILITOT 0.4 0.3   ALKPHOS 84 91   AST 17 16   ALT 25 24   ANIONGAP 8 9   EGFRNONAA 53.7* 43.1*       Significant Imaging: I have reviewed all pertinent imaging results/findings within the past 24 hours.

## 2019-03-02 NOTE — ASSESSMENT & PLAN NOTE
- Hx of RML embolus diagnosed 12/2018  - h/h down trending 6.9 (8.5 on admission)  - discontinued lovenox   - discussed with pulmonology and recommend starting on heparin drip to see if she tolerates, and then started on Xarelto this AM as patient did not tolerate Eliquis  -patient currently on a heparin ggt as she had a drop of her hg on xarelto    Plan:  -continue inpatient low dose heparin ggt until therapeutic with coumadin  -will check INR level this am, and daily INR levels

## 2019-03-02 NOTE — PROGRESS NOTES
[facility] Hospital Medicine  Progress Note    Patient Name: Makenzie Leija  MRN: [id number]  Patient Class: IP- Inpatient   Admission Date: 2/19/2019  Length of Stay: 11 days  Attending Physician: Anaid Jeffries MD  Primary Care Provider: Karthik Roth MD    Hospital Medicine Team: [facility] HOSP MED O Cristy Verdin MD    Subjective:     Principal Problem:Acute on chronic respiratory failure with hypoxia    HPI:  Ms. 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      Per OSH records:  Pt presented on 2/5/29 via EMS for respiratory distress despite supplemental oxygen. Pt reports increased SOB over the last 4-5 days. On the day of admission significant SOB despite inhale and changing from oxygen compressor to tank. SpO2 75% on 3.5 L at home. Triage noted patient's SpO2 69% on 3L NC. Patient was placed on BiPaP w/ improvement in her respiratory function. CTA chest 2/7/19: without evidence of PE; moderate bilateral pleural effusions; RML consolidation. Thoracentesis 2/9/19: consistent with transudate. (thoracentesis 12/18 negative cytology). Patient was treated w/ antibiotics. Echo December 2018, IHSS, elevated peak gradient across LVOT at 179mmHg and a mean of 94mmHg. FELICIA performed. Patient remained in the hospital till 2/19/19; during which she was diuresed ~15L, started on BB yet remained symptomatic. And decision was made to transfer patient to [facility].           Hospital Course:  Transferred to [facility] for higher level of cardiac care. Admitted to [facility] for  acute diastolic heart failure HOCM with pulmonary edema and bilateral pleural effusions. Patient admitted 2/19/19. 2D echo repeated; septal size 2.2cm, ROSAURA with severe outflow tract left ventricular obstruction. The peak gradient is near 100mm Hg. Patient started on BB and CCB. Will need ablation as an outpatient.     2/21/19 patient h/h noted to be 7 down from 8.5 and subcutaneous ecchymosis noted around her lower abdomen. Lovenox stopped and patient was transferred to hospital medicine. EGD on 2/25/19 showed no acute findings, and no source of bleed.  2/26/19, patients Hg dropped to 6.9 and she received her second blood transfusion.  Patient started on heparin ggt.   2/27/19.  Patient had episode of dyspnea overnight with sats remaining >88. On Heparin ggt for bleeding with adequate hg trends. LE US showed no DVTs.  CXR showed increased pulmonary edema and R sided effusion appears increased. US of abdomen showed no ascities. Interventional cardiology came to assess patient in light of HOCM and being +symptomatic.   2/28/19: Patient desatted overnight, requiring 6L of O2 for SpO2 88-90%.  Patient with pitting edema and CXR that showed fluid, given 20Lasix and responded well (-2L). Patient had a nose bleed overnight and coughed a small amount of blood. Heparin ggt was stopped temporarily and restarted AM. Patient started on lasix ggt.      Interval History: NAEON. UOP -1.45L overnight. Continue anticoagulation with warfarin; bridge with heparin.    Review of Systems   Constitutional: Positive for appetite change and fatigue. Negative for chills and fever.   HENT: Negative for sore throat and trouble swallowing.    Eyes: Negative for pain and visual disturbance.   Respiratory: Positive for cough (dry), chest tightness and shortness of breath (on exertion).    Cardiovascular: Negative for chest pain and palpitations.   Gastrointestinal: Negative for abdominal pain, blood in stool and nausea.   Genitourinary: Negative  for difficulty urinating and dysuria.   Musculoskeletal: Negative for arthralgias and gait problem.   Neurological: Negative for dizziness and headaches.   Psychiatric/Behavioral: Negative for confusion. The patient is not nervous/anxious.      Objective:     Vital Signs (Most Recent):  Temp: 98.4 °F (36.9 °C) (03/01/19 2104)  Pulse: 61 (03/02/19 0915)  Resp: 19 (03/02/19 0915)  BP: (!) 106/48 (03/02/19 0842)  SpO2: 100 % (03/02/19 0842) Vital Signs (24h Range):  Temp:  [98.2 °F (36.8 °C)-98.4 °F (36.9 °C)] 98.4 °F (36.9 °C)  Pulse:  [55-82] 61  Resp:  [14-23] 19  SpO2:  [93 %-100 %] 100 %  BP: ()/(48-61) 106/48     Weight: 89.5 kg (197 lb 5 oz)  Body mass index is 32.83 kg/m².    Intake/Output Summary (Last 24 hours) at 3/2/2019 1006  Last data filed at 3/2/2019 0600  Gross per 24 hour   Intake 600 ml   Output 1450 ml   Net -850 ml      Physical Exam   Constitutional: She is oriented to person, place, and time. She appears well-developed. No distress.   HENT:   Head: Normocephalic and atraumatic.   Neck: Neck supple. No tracheal deviation present.   Cardiovascular: Normal rate, regular rhythm and intact distal pulses.   Murmur heard.   Systolic murmur is present.  Pulmonary/Chest: She has decreased breath sounds in the right lower field. She has wheezes (mild diffuse wheezes).   On 3.5L nasal cannula, with SpO2 96%   Abdominal: Soft. She exhibits no distension.   Musculoskeletal: She exhibits edema (trace, bilateral lower extremities). She exhibits no deformity.   Neurological: She is alert and oriented to person, place, and time.   Skin: Skin is warm and dry.   Nursing note and vitals reviewed.      Significant Labs:   CBC:   Recent Labs   Lab 02/28/19  2049 03/01/19  1034 03/01/19  2110   WBC 9.32 9.12 9.33   HGB 8.0* 7.7* 7.7*   HCT 25.8* 24.8* 24.8*    294 316     CMP:   Recent Labs   Lab 03/01/19  0518 03/02/19  0610    135*   K 4.1 4.3   CL 99 97   CO2 29 29   * 130*   BUN 34* 48*    CREATININE 1.0 1.2   CALCIUM 9.5 9.4   PROT 6.3 6.6   ALBUMIN 2.8* 2.9*   BILITOT 0.4 0.3   ALKPHOS 84 91   AST 17 16   ALT 25 24   ANIONGAP 8 9   EGFRNONAA 53.7* 43.1*       Significant Imaging: I have reviewed all pertinent imaging results/findings within the past 24 hours.     Assessment/Plan:      * Acute on chronic respiratory failure with hypoxia    - Patient acute respiratory failure is multifactorial secondary to: moderate COPD, pulmonary HTN (PA 49), HOCM, PE, MARISOL (CPAP at 4), bilateral pulmonary effusion, hx of RUL cancer s/p chemo&XRT, recently tx for CAP zosyn x 12 days   - treated for PNA at OSH, no signs of infection. No further txt required   - per OSH records patient not tolerating CPAP at night, would get hypoxic and was switched over to BiPAP  - pulmonary consulted; appreciate assistance but do not feel there is anything else to offer her inpatient  -patient continues to experience increasing dyspnea. KUB and Abdo US imaging show no GI etiology of abdominal distention, likely fluid.  CXR shows increasing pulmonary edema and R sided effusion  -patient received 20IV lasix 2/28 and tolerated well (UOP, 2L)  -2/28 switched to lasix ggt      Plan  -continue lasix but will trasition from ggt to IV push 40mg once today and, measure ins and outs closely  - will continue BiPAP qhs  -patient will need BiPAP on discharge (order placed)  - wean O2 as able  -on discharge will need to have follow up with pulm and interventional cardiology         Acute blood loss anemia    - Patient w/ a history of ITP, bleeding diathesis, daughter with hemophilia B (implying pt is carrier), hx of bleeding.   - has been evaluated by hem/onc as outpatient and all workup was normal   - s/p 1 unit PRBCs  - hemoglobin 6.9 this a.m.  - EGD 2/25 showed a Normal esophagus, stomach and duodenum.    Plan:  - Use a proton pump inhibitor PO daily prophylactically since patient will be on anticoagulation  -will continue to monitor patient  CBC but will cut back to once daily since she hasnt had any signs of bleeding  -patient and family to decided to be anticoagulated with coumadin       Hypothyroidism    -patient has reported hypothyroidism  -TSH wnl    Plan  - continue home dose synthroid        Pulmonary HTN    - this is a chronic issue that is most likely exacerbating her SOB  - pulmonary artery systolic pressure (PASP)= 49  - Mean PAP can be approximated because mPAP = 0.61sPAP + 2  - Mean PAP= 31.89 (32)         HOCM (hypertrophic obstructive cardiomyopathy)    - Hypertrophic cardiomyopathy with asymmetric septal hypertrophy  - Systolic anterior motion of the mitral valve with severe outflow tract left ventricular obstruction. The peak gradient is near 100mm Hg.    Plan:  -Interventional cardiology saw patient and increased her medications to help with her symptoms, lower HR and increase ventricular filling time.   - will need outpatient interventional cardiology follow-up for ablation        Type 2 diabetes mellitus    -on Metformin at home and has great control (A1c was 5.6 on 2/19/19)  -patient has not required any SSI in last 48hrs  -BG remains in goal range    Plan  - Low dose insulin sliding scale   - bedside glucose monitoring         Pleural effusion, bilateral    - Thoracentesis 2/8 consistent w/ transudate (OSH studies)  - Thoracentesis 12/2018 cytology negative for malignancy  - Bilateral effusions notes on CXR     Plan  -continue patient on lasix (40IV push today with the intent of transitioning to oral tomorrow)  -monitor ins and outs closely   -will monitor closely as patient is pre-load depended (given her HOCM)       Chronic pulmonary embolism    - Hx of RML embolus diagnosed 12/2018  - h/h down trending 6.9 (8.5 on admission)  - discontinued lovenox   - discussed with pulmonology and recommend starting on heparin drip to see if she tolerates, and then started on Xarelto this AM as patient did not tolerate Eliquis  -patient  currently on a heparin ggt as she had a drop of her hg on xarelto    Plan:  -continue inpatient low dose heparin ggt until therapeutic with coumadin  -will check INR level this am, and daily INR levels     Acute on chronic diastolic congestive heart failure, NYHA class 3      Echo 2/19/2019  · Hypertrophic cardiomyopathy with asymmetric septal hypertrophy  · Normal left ventricular systolic function. The estimated ejection fraction is 65%  · Normal right ventricular systolic function.  · Severe left atrial enlargement.  · Systolic anterior motion of the mitral valve with severe outflow tract left ventricular obstruction. The peak gradient is near 100mm Hg.  · The estimated PA systolic pressure is 49 mm Hg  · Intermediate central venous pressure (8 mm Hg).     Plan:  - We are diuresing patient   -pt is preload dependent, so using conservative diuretic regimen  -switched to long acting metoprolol succinate 50 mg and Verapamil 240 (from 80) daily       COPD (chronic obstructive pulmonary disease)    - continue LAMA/ICS/LABA    Plan:  -will switch duo nebs to levalbuterol neb solution  -will continue to monitor closely and will d/c with follow up with Pulmonology         VTE Risk Mitigation (From admission, onward)        Ordered     warfarin (COUMADIN) tablet 5 mg  Daily      02/28/19 1800     heparin 25,000 units in dextrose 5% 250 ml (100 units/mL) infusion MINIMAL INTENSITY nomogram - [facility]  Continuous      02/27/19 1345              Cristy Verdin MD  Department of Hospital Medicine   [facility]

## 2019-03-02 NOTE — ASSESSMENT & PLAN NOTE
- continue LAMA/ICS/LABA    Plan:  -will switch duo nebs to levalbuterol neb solution  -will continue to monitor closely and will d/c with follow up with Pulmonology

## 2019-03-02 NOTE — ASSESSMENT & PLAN NOTE
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   - has been evaluated by hem/onc as outpatient and all workup was normal   - s/p 1 unit PRBCs  - hemoglobin 6.9 this a.m.  - EGD 2/25 showed a Normal esophagus, stomach and duodenum.    Plan:  - Use a proton pump inhibitor PO daily prophylactically since patient will be on anticoagulation  -will continue to monitor patient CBC but will cut back to once daily since she hasnt had any signs of bleeding  -patient and family to decided to be anticoagulated with coumadin

## 2019-03-02 NOTE — ASSESSMENT & PLAN NOTE
- Thoracentesis 2/8 consistent w/ transudate (OSH studies)  - Thoracentesis 12/2018 cytology negative for malignancy  - Bilateral effusions notes on CXR     Plan  -continue patient on lasix (40IV push today with the intent of transitioning to oral tomorrow)  -monitor ins and outs closely   -will monitor closely as patient is pre-load depended (given her HOCM)

## 2019-03-02 NOTE — PLAN OF CARE
Problem: Adult Inpatient Plan of Care  Goal: Plan of Care Review  Outcome: Ongoing (interventions implemented as appropriate)  AOx4, VSS, IV intact and infusing, AC&HS, tele, 2L NC, skin integrity maintained, pt independent with frequent position changes,no complaints of pain managed, progressing towards goals, fall precautions maintained with no falls noted during shift, bed in low locked position, call light within reach, ID band on, personal items in reach, will continue to monitor and follow plan of care.

## 2019-03-02 NOTE — PLAN OF CARE
Problem: Adult Inpatient Plan of Care  Goal: Plan of Care Review  Outcome: Ongoing (interventions implemented as appropriate)   03/01/19 1651   Plan of Care Review   Plan of Care Reviewed With patient;daughter   Progress improving     Pt free of falls/trauma/injuries. Bed in low position, wheels locked, and call light within reach. Skin integrity remains unchanged. CBG monitored as ordered. Heparin gtt infusing; adjusted per heparin nomogram. Repeat aPTT ordered per nomogram. Lasix gtt infusing as ordered. VSS and afebrile. No distress noted/reported at this time. Will continue to monitor.

## 2019-03-03 LAB
ABO + RH BLD: NORMAL
ALBUMIN SERPL BCP-MCNC: 2.8 G/DL
ALP SERPL-CCNC: 88 U/L
ALT SERPL W/O P-5'-P-CCNC: 21 U/L
ANION GAP SERPL CALC-SCNC: 9 MMOL/L
APTT BLDCRRT: 29.8 SEC
APTT BLDCRRT: 56.7 SEC
AST SERPL-CCNC: 14 U/L
BASOPHILS # BLD AUTO: 0.05 K/UL
BASOPHILS # BLD AUTO: 0.05 K/UL
BASOPHILS # BLD AUTO: 0.07 K/UL
BASOPHILS NFR BLD: 0.4 %
BASOPHILS NFR BLD: 0.5 %
BASOPHILS NFR BLD: 0.7 %
BILIRUB SERPL-MCNC: 0.3 MG/DL
BLD GP AB SCN CELLS X3 SERPL QL: NORMAL
BLD PROD TYP BPU: NORMAL
BLOOD UNIT EXPIRATION DATE: NORMAL
BLOOD UNIT TYPE CODE: 5100
BLOOD UNIT TYPE: NORMAL
BUN SERPL-MCNC: 53 MG/DL
CALCIUM SERPL-MCNC: 9.5 MG/DL
CHLORIDE SERPL-SCNC: 98 MMOL/L
CO2 SERPL-SCNC: 29 MMOL/L
CODING SYSTEM: NORMAL
CREAT SERPL-MCNC: 1.2 MG/DL
DIFFERENTIAL METHOD: ABNORMAL
DISPENSE STATUS: NORMAL
EOSINOPHIL # BLD AUTO: 0.3 K/UL
EOSINOPHIL # BLD AUTO: 0.5 K/UL
EOSINOPHIL # BLD AUTO: 0.6 K/UL
EOSINOPHIL NFR BLD: 2.2 %
EOSINOPHIL NFR BLD: 4.5 %
EOSINOPHIL NFR BLD: 5.4 %
ERYTHROCYTE [DISTWIDTH] IN BLOOD BY AUTOMATED COUNT: 15.8 %
EST. GFR  (AFRICAN AMERICAN): 49.7 ML/MIN/1.73 M^2
EST. GFR  (NON AFRICAN AMERICAN): 43.1 ML/MIN/1.73 M^2
GLUCOSE SERPL-MCNC: 127 MG/DL
HCT VFR BLD AUTO: 22.8 %
HCT VFR BLD AUTO: 22.8 %
HCT VFR BLD AUTO: 23.2 %
HCT VFR BLD AUTO: 23.2 %
HGB BLD-MCNC: 6.8 G/DL
HGB BLD-MCNC: 6.9 G/DL
HGB BLD-MCNC: 7 G/DL
HGB BLD-MCNC: 7 G/DL
IMM GRANULOCYTES # BLD AUTO: 0.25 K/UL
IMM GRANULOCYTES # BLD AUTO: 0.36 K/UL
IMM GRANULOCYTES # BLD AUTO: 0.39 K/UL
IMM GRANULOCYTES NFR BLD AUTO: 2.2 %
IMM GRANULOCYTES NFR BLD AUTO: 3.6 %
IMM GRANULOCYTES NFR BLD AUTO: 3.8 %
INR PPP: 0.9
LYMPHOCYTES # BLD AUTO: 0.9 K/UL
LYMPHOCYTES # BLD AUTO: 1.1 K/UL
LYMPHOCYTES # BLD AUTO: 1.2 K/UL
LYMPHOCYTES NFR BLD: 10.4 %
LYMPHOCYTES NFR BLD: 11.6 %
LYMPHOCYTES NFR BLD: 7.4 %
MAGNESIUM SERPL-MCNC: 2.1 MG/DL
MCH RBC QN AUTO: 28.2 PG
MCH RBC QN AUTO: 28.5 PG
MCH RBC QN AUTO: 28.8 PG
MCHC RBC AUTO-ENTMCNC: 29.8 G/DL
MCHC RBC AUTO-ENTMCNC: 30.2 G/DL
MCHC RBC AUTO-ENTMCNC: 30.3 G/DL
MCV RBC AUTO: 93 FL
MCV RBC AUTO: 95 FL
MCV RBC AUTO: 96 FL
MONOCYTES # BLD AUTO: 0.8 K/UL
MONOCYTES # BLD AUTO: 0.8 K/UL
MONOCYTES # BLD AUTO: 0.9 K/UL
MONOCYTES NFR BLD: 6.9 %
MONOCYTES NFR BLD: 7.5 %
MONOCYTES NFR BLD: 8.2 %
NEUTROPHILS # BLD AUTO: 7.2 K/UL
NEUTROPHILS # BLD AUTO: 7.5 K/UL
NEUTROPHILS # BLD AUTO: 9.2 K/UL
NEUTROPHILS NFR BLD: 71.2 %
NEUTROPHILS NFR BLD: 72.6 %
NEUTROPHILS NFR BLD: 80.9 %
NRBC BLD-RTO: 0 /100 WBC
PLATELET # BLD AUTO: 289 K/UL
PLATELET # BLD AUTO: 289 K/UL
PLATELET # BLD AUTO: 299 K/UL
PMV BLD AUTO: 10.3 FL
PMV BLD AUTO: 10.4 FL
PMV BLD AUTO: 10.4 FL
POCT GLUCOSE: 171 MG/DL (ref 70–110)
POCT GLUCOSE: 176 MG/DL (ref 70–110)
POCT GLUCOSE: 188 MG/DL (ref 70–110)
POTASSIUM SERPL-SCNC: 4.2 MMOL/L
PROT SERPL-MCNC: 6.2 G/DL
PROTHROMBIN TIME: 9.9 SEC
RBC # BLD AUTO: 2.39 M/UL
RBC # BLD AUTO: 2.43 M/UL
RBC # BLD AUTO: 2.45 M/UL
SODIUM SERPL-SCNC: 136 MMOL/L
TRANS ERYTHROCYTES VOL PATIENT: NORMAL ML
WBC # BLD AUTO: 10.14 K/UL
WBC # BLD AUTO: 10.37 K/UL
WBC # BLD AUTO: 11.42 K/UL

## 2019-03-03 PROCEDURE — 25000242 PHARM REV CODE 250 ALT 637 W/ HCPCS: Performed by: STUDENT IN AN ORGANIZED HEALTH CARE EDUCATION/TRAINING PROGRAM

## 2019-03-03 PROCEDURE — P9021 RED BLOOD CELLS UNIT: HCPCS

## 2019-03-03 PROCEDURE — 93005 ELECTROCARDIOGRAM TRACING: CPT

## 2019-03-03 PROCEDURE — 94660 CPAP INITIATION&MGMT: CPT

## 2019-03-03 PROCEDURE — 25000003 PHARM REV CODE 250: Performed by: INTERNAL MEDICINE

## 2019-03-03 PROCEDURE — 85025 COMPLETE CBC W/AUTO DIFF WBC: CPT

## 2019-03-03 PROCEDURE — 25000003 PHARM REV CODE 250: Performed by: STUDENT IN AN ORGANIZED HEALTH CARE EDUCATION/TRAINING PROGRAM

## 2019-03-03 PROCEDURE — 86920 COMPATIBILITY TEST SPIN: CPT

## 2019-03-03 PROCEDURE — 93010 EKG 12-LEAD: ICD-10-PCS | Mod: ,,, | Performed by: INTERNAL MEDICINE

## 2019-03-03 PROCEDURE — 99233 SBSQ HOSP IP/OBS HIGH 50: CPT | Mod: GC,,, | Performed by: HOSPITALIST

## 2019-03-03 PROCEDURE — 82272 OCCULT BLD FECES 1-3 TESTS: CPT

## 2019-03-03 PROCEDURE — 36430 TRANSFUSION BLD/BLD COMPNT: CPT

## 2019-03-03 PROCEDURE — 27000221 HC OXYGEN, UP TO 24 HOURS

## 2019-03-03 PROCEDURE — 94640 AIRWAY INHALATION TREATMENT: CPT

## 2019-03-03 PROCEDURE — 93010 ELECTROCARDIOGRAM REPORT: CPT | Mod: 77,ICN,, | Performed by: INTERNAL MEDICINE

## 2019-03-03 PROCEDURE — 25000003 PHARM REV CODE 250: Performed by: HOSPITALIST

## 2019-03-03 PROCEDURE — 93010 EKG 12-LEAD: ICD-10-PCS | Mod: 77,ICN,, | Performed by: INTERNAL MEDICINE

## 2019-03-03 PROCEDURE — 85610 PROTHROMBIN TIME: CPT

## 2019-03-03 PROCEDURE — 63600175 PHARM REV CODE 636 W HCPCS: Performed by: HOSPITALIST

## 2019-03-03 PROCEDURE — 93010 ELECTROCARDIOGRAM REPORT: CPT | Mod: ,,, | Performed by: INTERNAL MEDICINE

## 2019-03-03 PROCEDURE — 94761 N-INVAS EAR/PLS OXIMETRY MLT: CPT

## 2019-03-03 PROCEDURE — 99233 PR SUBSEQUENT HOSPITAL CARE,LEVL III: ICD-10-PCS | Mod: GC,,, | Performed by: HOSPITALIST

## 2019-03-03 PROCEDURE — 99900035 HC TECH TIME PER 15 MIN (STAT)

## 2019-03-03 PROCEDURE — 85730 THROMBOPLASTIN TIME PARTIAL: CPT

## 2019-03-03 PROCEDURE — 80053 COMPREHEN METABOLIC PANEL: CPT

## 2019-03-03 PROCEDURE — 83735 ASSAY OF MAGNESIUM: CPT

## 2019-03-03 PROCEDURE — 85730 THROMBOPLASTIN TIME PARTIAL: CPT | Mod: 91

## 2019-03-03 PROCEDURE — 36415 COLL VENOUS BLD VENIPUNCTURE: CPT

## 2019-03-03 PROCEDURE — 11000001 HC ACUTE MED/SURG PRIVATE ROOM

## 2019-03-03 PROCEDURE — 86901 BLOOD TYPING SEROLOGIC RH(D): CPT

## 2019-03-03 RX ORDER — HEPARIN SODIUM,PORCINE/D5W 25000/250
18 INTRAVENOUS SOLUTION INTRAVENOUS CONTINUOUS
Status: CANCELLED | OUTPATIENT
Start: 2019-03-03

## 2019-03-03 RX ORDER — HYDROCODONE BITARTRATE AND ACETAMINOPHEN 500; 5 MG/1; MG/1
TABLET ORAL
Status: DISCONTINUED | OUTPATIENT
Start: 2019-03-03 | End: 2019-03-08 | Stop reason: HOSPADM

## 2019-03-03 RX ADMIN — HYDROCODONE BITARTRATE AND ACETAMINOPHEN 1 TABLET: 10; 325 TABLET ORAL at 05:03

## 2019-03-03 RX ADMIN — GABAPENTIN 100 MG: 100 CAPSULE ORAL at 08:03

## 2019-03-03 RX ADMIN — MONTELUKAST SODIUM 10 MG: 10 TABLET, FILM COATED ORAL at 09:03

## 2019-03-03 RX ADMIN — ALPRAZOLAM 0.5 MG: 0.5 TABLET ORAL at 09:03

## 2019-03-03 RX ADMIN — HEPARIN SODIUM AND DEXTROSE 18 UNITS/KG/HR: 10000; 5 INJECTION INTRAVENOUS at 06:03

## 2019-03-03 RX ADMIN — LEVOTHYROXINE SODIUM 112 MCG: 112 TABLET ORAL at 05:03

## 2019-03-03 RX ADMIN — FLUTICASONE PROPIONATE 50 MCG: 50 SPRAY, METERED NASAL at 08:03

## 2019-03-03 RX ADMIN — PANTOPRAZOLE SODIUM 40 MG: 40 TABLET, DELAYED RELEASE ORAL at 08:03

## 2019-03-03 RX ADMIN — DOCUSATE SODIUM 100 MG: 100 CAPSULE, LIQUID FILLED ORAL at 08:03

## 2019-03-03 RX ADMIN — LEVALBUTEROL 1.25 MG: 1.25 SOLUTION, CONCENTRATE RESPIRATORY (INHALATION) at 07:03

## 2019-03-03 RX ADMIN — ACETYLCYSTEINE 4 ML: 100 SOLUTION ORAL; RESPIRATORY (INHALATION) at 01:03

## 2019-03-03 RX ADMIN — HEPARIN SODIUM AND DEXTROSE 20 UNITS/KG/HR: 10000; 5 INJECTION INTRAVENOUS at 01:03

## 2019-03-03 RX ADMIN — VERAPAMIL HYDROCHLORIDE 240 MG: 240 TABLET, FILM COATED, EXTENDED RELEASE ORAL at 10:03

## 2019-03-03 RX ADMIN — LEVALBUTEROL 1.25 MG: 1.25 SOLUTION, CONCENTRATE RESPIRATORY (INHALATION) at 09:03

## 2019-03-03 RX ADMIN — ACETYLCYSTEINE 4 ML: 100 SOLUTION ORAL; RESPIRATORY (INHALATION) at 09:03

## 2019-03-03 RX ADMIN — TIOTROPIUM BROMIDE 18 MCG: 18 CAPSULE ORAL; RESPIRATORY (INHALATION) at 08:03

## 2019-03-03 RX ADMIN — GABAPENTIN 100 MG: 100 CAPSULE ORAL at 10:03

## 2019-03-03 RX ADMIN — ACETYLCYSTEINE 4 ML: 100 SOLUTION ORAL; RESPIRATORY (INHALATION) at 07:03

## 2019-03-03 NOTE — PROGRESS NOTES
[facility] Hospital Medicine  Progress Note    Patient Name: Makenzie Leija  MRN: [id number]  Patient Class: IP- Inpatient   Admission Date: 2/19/2019  Length of Stay: 12 days  Attending Physician: Anaid Jeffries MD  Primary Care Provider: Karthik Roth MD    Hospital Medicine Team: [facility] HOSP MED O Cristy Verdin MD    Subjective:     Principal Problem:Acute on chronic respiratory failure with hypoxia    HPI:  Ms. 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      Per OSH records:  Pt presented on 2/5/29 via EMS for respiratory distress despite supplemental oxygen. Pt reports increased SOB over the last 4-5 days. On the day of admission significant SOB despite inhale and changing from oxygen compressor to tank. SpO2 75% on 3.5 L at home. Triage noted patient's SpO2 69% on 3L NC. Patient was placed on BiPaP w/ improvement in her respiratory function. CTA chest 2/7/19: without evidence of PE; moderate bilateral pleural effusions; RML consolidation. Thoracentesis 2/9/19: consistent with transudate. (thoracentesis 12/18 negative cytology). Patient was treated w/ antibiotics. Echo December 2018, IHSS, elevated peak gradient across LVOT at 179mmHg and a mean of 94mmHg. FELICIA performed. Patient remained in the hospital till 2/19/19; during which she was diuresed ~15L, started on BB yet remained symptomatic. And decision was made to transfer patient to [facility].           Hospital Course:  Transferred to [facility] for higher level of cardiac care. Admitted to [facility] for  acute diastolic heart failure HOCM with pulmonary edema and bilateral pleural effusions. Patient admitted 2/19/19. 2D echo repeated; septal size 2.2cm, ROSAURA with severe outflow tract left ventricular obstruction. The peak gradient is near 100mm Hg. Patient started on BB and CCB. Will need ablation as an outpatient.     2/21/19 patient h/h noted to be 7 down from 8.5 and subcutaneous ecchymosis noted around her lower abdomen. Lovenox stopped and patient was transferred to hospital medicine. EGD on 2/25/19 showed no acute findings, and no source of bleed.  2/26/19, patients Hg dropped to 6.9 and she received her second blood transfusion.  Patient started on heparin ggt.   2/27/19.  Patient had episode of dyspnea overnight with sats remaining >88. On Heparin ggt for bleeding with adequate hg trends. LE US showed no DVTs.  CXR showed increased pulmonary edema and R sided effusion appears increased. US of abdomen showed no ascities. Interventional cardiology came to assess patient in light of HOCM and being +symptomatic.   2/28/19: Patient desatted overnight, requiring 6L of O2 for SpO2 88-90%.  Patient with pitting edema and CXR that showed fluid, given 20Lasix and responded well (-2L). Patient had a nose bleed overnight and coughed a small amount of blood. Heparin ggt was stopped temporarily and restarted AM. Patient started on lasix ggt.      Interval History:   -No issues with breathing overnight.  Doing well.  -patient with Hg of 6.8, no obvious bleeding source  -UOP -2.5L (total of -9L during visit)  -INR continues to be 0.9    Review of Systems   Constitutional: Positive for appetite change and fatigue. Negative for chills and fever.   HENT: Negative for sore throat and trouble swallowing.    Eyes: Negative for pain and visual disturbance.   Respiratory: Positive for cough (dry), chest tightness and shortness of breath (on exertion).    Cardiovascular: Negative for chest pain and palpitations.   Gastrointestinal:  Negative for abdominal pain, blood in stool and nausea.   Genitourinary: Negative for difficulty urinating and dysuria.   Musculoskeletal: Negative for arthralgias and gait problem.   Neurological: Negative for dizziness and headaches.   Psychiatric/Behavioral: Negative for confusion. The patient is not nervous/anxious.      Objective:     Vital Signs (Most Recent):  Temp: 98.2 °F (36.8 °C) (03/02/19 2154)  Pulse: 88 (03/03/19 0836)  Resp: 18 (03/03/19 0836)  BP: (!) 95/59 (03/03/19 0556)  SpO2: 99 % (03/03/19 0556) Vital Signs (24h Range):  Temp:  [98 °F (36.7 °C)-98.2 °F (36.8 °C)] 98.2 °F (36.8 °C)  Pulse:  [55-88] 88  Resp:  [13-23] 18  SpO2:  [97 %-100 %] 99 %  BP: ()/(57-64) 95/59     Weight: 89.5 kg (197 lb 5 oz)  Body mass index is 32.83 kg/m².    Intake/Output Summary (Last 24 hours) at 3/3/2019 0849  Last data filed at 3/3/2019 0500  Gross per 24 hour   Intake 300 ml   Output 2000 ml   Net -1700 ml      Physical Exam   Constitutional: She is oriented to person, place, and time. She appears well-developed. No distress.   HENT:   Head: Normocephalic and atraumatic.   Neck: Neck supple. No tracheal deviation present.   Cardiovascular: Normal rate, regular rhythm and intact distal pulses.   Murmur heard.   Systolic murmur is present.  Pulmonary/Chest: She has decreased breath sounds in the right lower field. She has wheezes (mild diffuse wheezes).   On 3.5L nasal cannula, with SpO2 96%   Abdominal: Soft. She exhibits no distension.   Musculoskeletal: She exhibits edema (trace, bilateral lower extremities). She exhibits no deformity.   Neurological: She is alert and oriented to person, place, and time.   Skin: Skin is warm and dry.   Nursing note and vitals reviewed.      Significant Labs:   CBC:   Recent Labs   Lab 03/01/19  2110 03/02/19  1206 03/03/19  0500   WBC 9.33 10.31 10.14   HGB 7.7* 7.4* 6.8*   HCT 24.8* 24.3* 22.8*    305 289     CMP:   Recent Labs   Lab 03/02/19  0610 03/03/19  0500    * 136   K 4.3 4.2   CL 97 98   CO2 29 29   * 127*   BUN 48* 53*   CREATININE 1.2 1.2   CALCIUM 9.4 9.5   PROT 6.6 6.2   ALBUMIN 2.9* 2.8*   BILITOT 0.3 0.3   ALKPHOS 91 88   AST 16 14   ALT 24 21   ANIONGAP 9 9   EGFRNONAA 43.1* 43.1*       Significant Imaging: I have reviewed all pertinent imaging results/findings within the past 24 hours.     Assessment/Plan:      * Acute on chronic respiratory failure with hypoxia    - Patient acute respiratory failure is multifactorial secondary to: moderate COPD, pulmonary HTN (PA 49), HOCM, PE, MARISOL (CPAP at 4), bilateral pulmonary effusion, hx of RUL cancer s/p chemo&XRT, recently tx for CAP zosyn x 12 days   - treated for PNA at OSH, no signs of infection. No further txt required   - per OSH records patient not tolerating CPAP at night, would get hypoxic and was switched over to BiPAP  - pulmonary consulted; appreciate assistance but do not feel there is anything else to offer her inpatient  -patient continues to experience increasing dyspnea. KUB and Abdo US imaging show no GI etiology of abdominal distention, likely fluid.  CXR shows increasing pulmonary edema and R sided effusion  -patient received 20IV lasix 2/28 and tolerated well (UOP, 2L)  -2/28 switched to lasix ggt      Plan  -continue lasix but will trasition from ggt to IV push 40mg once today and, measure ins and outs closely  - will continue BiPAP qhs  -patient will need BiPAP on discharge (order placed)  - wean O2 as able  -on discharge will need to have follow up with pulm and interventional cardiology         Acute blood loss anemia    - Patient w/ a history of ITP, bleeding diathesis, daughter with hemophilia B (implying pt is carrier), hx of bleeding.   - has been evaluated by hem/onc as outpatient and all workup was normal   - s/p 1 unit PRBCs  - hemoglobin 6.9 this a.m.  - EGD 2/25 showed a Normal esophagus, stomach and duodenum.  -Hg 6.8 on 3/3/19    Plan:  -transfuse 1u pRBC  - Use a  proton pump inhibitor PO daily prophylactically since patient will be on anticoagulation  -will continue to monitor patient CBC but will cut back to once daily since she hasnt had any signs of bleeding  -patient and family to decided if she wishes to continue to be anticoagulated with coumadin       Hypothyroidism    -patient has reported hypothyroidism  -TSH wnl    Plan  - continue home dose synthroid        Pulmonary HTN    - this is a chronic issue that is most likely exacerbating her SOB  - pulmonary artery systolic pressure (PASP)= 49  - Mean PAP can be approximated because mPAP = 0.61sPAP + 2  - Mean PAP= 31.89 (32)         HOCM (hypertrophic obstructive cardiomyopathy)    - Hypertrophic cardiomyopathy with asymmetric septal hypertrophy  - Systolic anterior motion of the mitral valve with severe outflow tract left ventricular obstruction. The peak gradient is near 100mm Hg.    Plan:  -Interventional cardiology saw patient and increased her medications to help with her symptoms, lower HR and increase ventricular filling time.   - will need outpatient interventional cardiology follow-up for ablation        Type 2 diabetes mellitus    -on Metformin at home and has great control (A1c was 5.6 on 2/19/19)  -patient has not required any SSI in last 48hrs  -BG remains in goal range    Plan  - Low dose insulin sliding scale   - bedside glucose monitoring         Pleural effusion, bilateral    - Thoracentesis 2/8 consistent w/ transudate (OSH studies)  - Thoracentesis 12/2018 cytology negative for malignancy  - Bilateral effusions notes on CXR     Plan  -continue patient on lasix but will transition to 40PO BID  -monitor ins and outs closely   -will monitor closely as patient is pre-load depended (given her HOCM)       Chronic pulmonary embolism    - Hx of RML embolus diagnosed 12/2018  - h/h down trending 6.9 (8.5 on admission)  - discontinued lovenox   - discussed with pulmonology and recommend starting on heparin  drip to see if she tolerates, and then started on Xarelto this AM as patient did not tolerate Eliquis  -patient currently on a heparin ggt as she had a drop of her hg on xarelto    Plan:  -given patients drop in Hg patient wishes to discuss blood thinners further with her family  -if patient decides to continue on coumadin, inpatient consult placed to pharmD for help with coumadin management  -continue inpatient low dose heparin ggt until therapeutic with coumadin  -will check INR level this am, and daily INR levels     Acute on chronic diastolic congestive heart failure, NYHA class 3      Echo 2/19/2019  · Hypertrophic cardiomyopathy with asymmetric septal hypertrophy  · Normal left ventricular systolic function. The estimated ejection fraction is 65%  · Normal right ventricular systolic function.  · Severe left atrial enlargement.  · Systolic anterior motion of the mitral valve with severe outflow tract left ventricular obstruction. The peak gradient is near 100mm Hg.  · The estimated PA systolic pressure is 49 mm Hg  · Intermediate central venous pressure (8 mm Hg).     Plan:  - We are diuresing patient   -pt is preload dependent, so using conservative diuretic regimen  -switched to long acting metoprolol succinate 50 mg and Verapamil 240 (from 80) daily       COPD (chronic obstructive pulmonary disease)    - continue LAMA/ICS/LABA    Plan:  -will switch duo nebs to levalbuterol neb solution  -will continue to monitor closely and will d/c with follow up with Pulmonology         VTE Risk Mitigation (From admission, onward)        Ordered     warfarin (COUMADIN) tablet 5 mg  Daily      02/28/19 1800     heparin 25,000 units in dextrose 5% 250 ml (100 units/mL) infusion MINIMAL INTENSITY nomogram - [facility]  Continuous      02/27/19 1345              Cristy Verdin MD  Department of Hospital Medicine   [facility]

## 2019-03-03 NOTE — CARE UPDATE
I spoke with Ms. Leija and his son at bedside regarding anti-coagulartion.    In light of patient's drop in Hg (without any source of bleed that can be identified) patient has opted to stop anticoagulation.  We discussed again the risk of bleeding vs clot and she has discussed all options and risks with family and feel that no anticoagulation is best for her.      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    Anticoagulation stopped. Will closely monitor.    Cristy Verdin MD  Internal Medicine, PGY3  Pager [phone]

## 2019-03-03 NOTE — ASSESSMENT & PLAN NOTE
Echo 2/19/2019  · Hypertrophic cardiomyopathy with asymmetric septal hypertrophy  · Normal left ventricular systolic function. The estimated ejection fraction is 65%  · Normal right ventricular systolic function.  · Severe left atrial enlargement.  · Systolic anterior motion of the mitral valve with severe outflow tract left ventricular obstruction. The peak gradient is near 100mm Hg.  · The estimated PA systolic pressure is 49 mm Hg  · Intermediate central venous pressure (8 mm Hg).     Plan:  - We are diuresing patient   -pt is preload dependent, so using conservative diuretic regimen  -switched to long acting metoprolol succinate 50 mg and Verapamil 240 (from 80) daily

## 2019-03-03 NOTE — ASSESSMENT & PLAN NOTE
- Patient acute respiratory failure is multifactorial secondary to: moderate COPD, pulmonary HTN (PA 49), HOCM, PE, MAIRSOL (CPAP at 4), bilateral pulmonary effusion, hx of RUL cancer s/p chemo&XRT, recently tx for CAP zosyn x 12 days   - treated for PNA at OSH, no signs of infection. No further txt required   - per OSH records patient not tolerating CPAP at night, would get hypoxic and was switched over to BiPAP  - pulmonary consulted; appreciate assistance but do not feel there is anything else to offer her inpatient  -patient continues to experience increasing dyspnea. KUB and Abdo US imaging show no GI etiology of abdominal distention, likely fluid.  CXR shows increasing pulmonary edema and R sided effusion  -patient received 20IV lasix 2/28 and tolerated well (UOP, 2L)  -2/28 switched to lasix ggt      Plan  -continue lasix but will trasition from ggt to IV push 40mg once today and, measure ins and outs closely  - will continue BiPAP qhs  -patient will need BiPAP on discharge (order placed)  - wean O2 as able  -on discharge will need to have follow up with pulm and interventional cardiology

## 2019-03-03 NOTE — ASSESSMENT & PLAN NOTE
- Hx of RML embolus diagnosed 12/2018  - h/h down trending 6.9 (8.5 on admission)  - discontinued lovenox   - discussed with pulmonology and recommend starting on heparin drip to see if she tolerates, and then started on Xarelto this AM as patient did not tolerate Eliquis  -patient currently on a heparin ggt as she had a drop of her hg on xarelto    Plan:  -inpatient consult placed to pharmD for help with coumadin management  -continue inpatient low dose heparin ggt until therapeutic with coumadin  -will check INR level this am, and daily INR levels

## 2019-03-03 NOTE — NURSING
Tried to retrieve bld from the blood bank, per blood bank, pending T&S from the lab. Labs sent off this am.

## 2019-03-03 NOTE — NURSING
Pt complaining of chest tightness, asking why tele box is not lighting up for this issue. Advised pt that the box would not light up bc of her chest tightness, but we have people who actually monitor the boxes.  Also advised pt will inform the provider w/ med team O of her issue.    Paged med team O, will follow orders.

## 2019-03-03 NOTE — ASSESSMENT & PLAN NOTE
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   - has been evaluated by hem/onc as outpatient and all workup was normal   - s/p 1 unit PRBCs  - hemoglobin 6.9 this a.m.  - EGD 2/25 showed a Normal esophagus, stomach and duodenum.  -Hg 6.8 on 3/3/19    Plan:  -transfuse 1u pRBC  - Use a proton pump inhibitor PO daily prophylactically since patient will be on anticoagulation  -will continue to monitor patient CBC but will cut back to once daily since she hasnt had any signs of bleeding  -patient and family to decided to be anticoagulated with coumadin

## 2019-03-03 NOTE — SUBJECTIVE & OBJECTIVE
Interval History:   -No issues with breathing overnight.  Doing well.  -patient with Hg of 6.8, no obvious bleeding source  -UOP -2.5L (total of -9L during visit)  -INR continues to be 0.9    Review of Systems   Constitutional: Positive for appetite change and fatigue. Negative for chills and fever.   HENT: Negative for sore throat and trouble swallowing.    Eyes: Negative for pain and visual disturbance.   Respiratory: Positive for cough (dry), chest tightness and shortness of breath (on exertion).    Cardiovascular: Negative for chest pain and palpitations.   Gastrointestinal: Negative for abdominal pain, blood in stool and nausea.   Genitourinary: Negative for difficulty urinating and dysuria.   Musculoskeletal: Negative for arthralgias and gait problem.   Neurological: Negative for dizziness and headaches.   Psychiatric/Behavioral: Negative for confusion. The patient is not nervous/anxious.      Objective:     Vital Signs (Most Recent):  Temp: 98.2 °F (36.8 °C) (03/02/19 2154)  Pulse: 88 (03/03/19 0836)  Resp: 18 (03/03/19 0836)  BP: (!) 95/59 (03/03/19 0556)  SpO2: 99 % (03/03/19 0556) Vital Signs (24h Range):  Temp:  [98 °F (36.7 °C)-98.2 °F (36.8 °C)] 98.2 °F (36.8 °C)  Pulse:  [55-88] 88  Resp:  [13-23] 18  SpO2:  [97 %-100 %] 99 %  BP: ()/(57-64) 95/59     Weight: 89.5 kg (197 lb 5 oz)  Body mass index is 32.83 kg/m².    Intake/Output Summary (Last 24 hours) at 3/3/2019 0849  Last data filed at 3/3/2019 0500  Gross per 24 hour   Intake 300 ml   Output 2000 ml   Net -1700 ml      Physical Exam   Constitutional: She is oriented to person, place, and time. She appears well-developed. No distress.   HENT:   Head: Normocephalic and atraumatic.   Neck: Neck supple. No tracheal deviation present.   Cardiovascular: Normal rate, regular rhythm and intact distal pulses.   Murmur heard.   Systolic murmur is present.  Pulmonary/Chest: She has decreased breath sounds in the right lower field. She has wheezes (mild  diffuse wheezes).   On 3.5L nasal cannula, with SpO2 96%   Abdominal: Soft. She exhibits no distension.   Musculoskeletal: She exhibits edema (trace, bilateral lower extremities). She exhibits no deformity.   Neurological: She is alert and oriented to person, place, and time.   Skin: Skin is warm and dry.   Nursing note and vitals reviewed.      Significant Labs:   CBC:   Recent Labs   Lab 03/01/19  2110 03/02/19  1206 03/03/19  0500   WBC 9.33 10.31 10.14   HGB 7.7* 7.4* 6.8*   HCT 24.8* 24.3* 22.8*    305 289     CMP:   Recent Labs   Lab 03/02/19  0610 03/03/19  0500   * 136   K 4.3 4.2   CL 97 98   CO2 29 29   * 127*   BUN 48* 53*   CREATININE 1.2 1.2   CALCIUM 9.4 9.5   PROT 6.6 6.2   ALBUMIN 2.9* 2.8*   BILITOT 0.3 0.3   ALKPHOS 91 88   AST 16 14   ALT 24 21   ANIONGAP 9 9   EGFRNONAA 43.1* 43.1*       Significant Imaging: I have reviewed all pertinent imaging results/findings within the past 24 hours.

## 2019-03-04 LAB
ALBUMIN SERPL BCP-MCNC: 2.9 G/DL
ALBUMIN SERPL BCP-MCNC: 3 G/DL
ALP SERPL-CCNC: 86 U/L
ALP SERPL-CCNC: 86 U/L
ALT SERPL W/O P-5'-P-CCNC: 18 U/L
ALT SERPL W/O P-5'-P-CCNC: 18 U/L
ANION GAP SERPL CALC-SCNC: 10 MMOL/L
ANION GAP SERPL CALC-SCNC: 9 MMOL/L
AST SERPL-CCNC: 15 U/L
AST SERPL-CCNC: 16 U/L
BASOPHILS # BLD AUTO: 0.06 K/UL
BASOPHILS # BLD AUTO: 0.08 K/UL
BASOPHILS NFR BLD: 0.4 %
BASOPHILS NFR BLD: 0.7 %
BILIRUB SERPL-MCNC: 0.5 MG/DL
BILIRUB SERPL-MCNC: 0.5 MG/DL
BNP SERPL-MCNC: 645 PG/ML
BUN SERPL-MCNC: 41 MG/DL
BUN SERPL-MCNC: 45 MG/DL
CALCIUM SERPL-MCNC: 9.4 MG/DL
CALCIUM SERPL-MCNC: 9.5 MG/DL
CHLORIDE SERPL-SCNC: 101 MMOL/L
CHLORIDE SERPL-SCNC: 101 MMOL/L
CO2 SERPL-SCNC: 23 MMOL/L
CO2 SERPL-SCNC: 27 MMOL/L
CREAT SERPL-MCNC: 1.1 MG/DL
CREAT SERPL-MCNC: 1.3 MG/DL
DIFFERENTIAL METHOD: ABNORMAL
DIFFERENTIAL METHOD: ABNORMAL
EOSINOPHIL # BLD AUTO: 0.2 K/UL
EOSINOPHIL # BLD AUTO: 0.4 K/UL
EOSINOPHIL NFR BLD: 1.3 %
EOSINOPHIL NFR BLD: 3.7 %
ERYTHROCYTE [DISTWIDTH] IN BLOOD BY AUTOMATED COUNT: 15.3 %
ERYTHROCYTE [DISTWIDTH] IN BLOOD BY AUTOMATED COUNT: 15.9 %
EST. GFR  (AFRICAN AMERICAN): 45.1 ML/MIN/1.73 M^2
EST. GFR  (AFRICAN AMERICAN): 55.2 ML/MIN/1.73 M^2
EST. GFR  (NON AFRICAN AMERICAN): 39.1 ML/MIN/1.73 M^2
EST. GFR  (NON AFRICAN AMERICAN): 47.9 ML/MIN/1.73 M^2
GLUCOSE SERPL-MCNC: 126 MG/DL
GLUCOSE SERPL-MCNC: 188 MG/DL
HCT VFR BLD AUTO: 24.7 %
HCT VFR BLD AUTO: 26 %
HGB BLD-MCNC: 7.6 G/DL
HGB BLD-MCNC: 8.1 G/DL
IMM GRANULOCYTES # BLD AUTO: 0.22 K/UL
IMM GRANULOCYTES # BLD AUTO: 0.36 K/UL
IMM GRANULOCYTES NFR BLD AUTO: 1.9 %
IMM GRANULOCYTES NFR BLD AUTO: 2.2 %
LYMPHOCYTES # BLD AUTO: 0.8 K/UL
LYMPHOCYTES # BLD AUTO: 0.9 K/UL
LYMPHOCYTES NFR BLD: 5.7 %
LYMPHOCYTES NFR BLD: 7.1 %
MAGNESIUM SERPL-MCNC: 2.4 MG/DL
MCH RBC QN AUTO: 28.8 PG
MCH RBC QN AUTO: 28.8 PG
MCHC RBC AUTO-ENTMCNC: 30.8 G/DL
MCHC RBC AUTO-ENTMCNC: 31.2 G/DL
MCV RBC AUTO: 93 FL
MCV RBC AUTO: 94 FL
MONOCYTES # BLD AUTO: 0.8 K/UL
MONOCYTES # BLD AUTO: 0.9 K/UL
MONOCYTES NFR BLD: 5.4 %
MONOCYTES NFR BLD: 7.2 %
NEUTROPHILS # BLD AUTO: 14.1 K/UL
NEUTROPHILS # BLD AUTO: 9.3 K/UL
NEUTROPHILS NFR BLD: 79.4 %
NEUTROPHILS NFR BLD: 85 %
NRBC BLD-RTO: 0 /100 WBC
NRBC BLD-RTO: 0 /100 WBC
OB PNL STL: POSITIVE
PLATELET # BLD AUTO: 257 K/UL
PLATELET # BLD AUTO: 273 K/UL
PMV BLD AUTO: 10.3 FL
PMV BLD AUTO: 9.9 FL
POCT GLUCOSE: 174 MG/DL (ref 70–110)
POCT GLUCOSE: 186 MG/DL (ref 70–110)
POCT GLUCOSE: 189 MG/DL (ref 70–110)
POCT GLUCOSE: 200 MG/DL (ref 70–110)
POTASSIUM SERPL-SCNC: 4.1 MMOL/L
POTASSIUM SERPL-SCNC: 4.2 MMOL/L
PROT SERPL-MCNC: 6.2 G/DL
PROT SERPL-MCNC: 6.6 G/DL
RBC # BLD AUTO: 2.64 M/UL
RBC # BLD AUTO: 2.81 M/UL
SODIUM SERPL-SCNC: 134 MMOL/L
SODIUM SERPL-SCNC: 137 MMOL/L
TROPONIN I SERPL DL<=0.01 NG/ML-MCNC: 0.02 NG/ML
TROPONIN I SERPL DL<=0.01 NG/ML-MCNC: 0.02 NG/ML
WBC # BLD AUTO: 11.71 K/UL
WBC # BLD AUTO: 16.6 K/UL

## 2019-03-04 PROCEDURE — 80053 COMPREHEN METABOLIC PANEL: CPT | Mod: 91

## 2019-03-04 PROCEDURE — 93005 ELECTROCARDIOGRAM TRACING: CPT

## 2019-03-04 PROCEDURE — 84484 ASSAY OF TROPONIN QUANT: CPT

## 2019-03-04 PROCEDURE — 99232 SBSQ HOSP IP/OBS MODERATE 35: CPT | Mod: GC,,, | Performed by: HOSPITALIST

## 2019-03-04 PROCEDURE — 94660 CPAP INITIATION&MGMT: CPT

## 2019-03-04 PROCEDURE — 11000001 HC ACUTE MED/SURG PRIVATE ROOM

## 2019-03-04 PROCEDURE — 25000242 PHARM REV CODE 250 ALT 637 W/ HCPCS: Performed by: STUDENT IN AN ORGANIZED HEALTH CARE EDUCATION/TRAINING PROGRAM

## 2019-03-04 PROCEDURE — 94640 AIRWAY INHALATION TREATMENT: CPT

## 2019-03-04 PROCEDURE — 25000003 PHARM REV CODE 250: Performed by: INTERNAL MEDICINE

## 2019-03-04 PROCEDURE — 27000221 HC OXYGEN, UP TO 24 HOURS

## 2019-03-04 PROCEDURE — 94761 N-INVAS EAR/PLS OXIMETRY MLT: CPT

## 2019-03-04 PROCEDURE — 99900035 HC TECH TIME PER 15 MIN (STAT)

## 2019-03-04 PROCEDURE — 36415 COLL VENOUS BLD VENIPUNCTURE: CPT

## 2019-03-04 PROCEDURE — 83880 ASSAY OF NATRIURETIC PEPTIDE: CPT

## 2019-03-04 PROCEDURE — 25000003 PHARM REV CODE 250: Performed by: STUDENT IN AN ORGANIZED HEALTH CARE EDUCATION/TRAINING PROGRAM

## 2019-03-04 PROCEDURE — 93010 ELECTROCARDIOGRAM REPORT: CPT | Mod: ,,, | Performed by: INTERNAL MEDICINE

## 2019-03-04 PROCEDURE — 25000003 PHARM REV CODE 250: Performed by: HOSPITALIST

## 2019-03-04 PROCEDURE — 80053 COMPREHEN METABOLIC PANEL: CPT

## 2019-03-04 PROCEDURE — 93010 EKG 12-LEAD: ICD-10-PCS | Mod: ,,, | Performed by: INTERNAL MEDICINE

## 2019-03-04 PROCEDURE — 99232 PR SUBSEQUENT HOSPITAL CARE,LEVL II: ICD-10-PCS | Mod: GC,,, | Performed by: HOSPITALIST

## 2019-03-04 PROCEDURE — 83735 ASSAY OF MAGNESIUM: CPT

## 2019-03-04 PROCEDURE — 85025 COMPLETE CBC W/AUTO DIFF WBC: CPT | Mod: 91

## 2019-03-04 RX ORDER — FUROSEMIDE 40 MG/1
40 TABLET ORAL 2 TIMES DAILY
Status: DISCONTINUED | OUTPATIENT
Start: 2019-03-04 | End: 2019-03-08 | Stop reason: HOSPADM

## 2019-03-04 RX ADMIN — PANTOPRAZOLE SODIUM 40 MG: 40 TABLET, DELAYED RELEASE ORAL at 08:03

## 2019-03-04 RX ADMIN — FLUTICASONE PROPIONATE 50 MCG: 50 SPRAY, METERED NASAL at 09:03

## 2019-03-04 RX ADMIN — MONTELUKAST SODIUM 10 MG: 10 TABLET, FILM COATED ORAL at 08:03

## 2019-03-04 RX ADMIN — METOPROLOL SUCCINATE 100 MG: 100 TABLET, EXTENDED RELEASE ORAL at 08:03

## 2019-03-04 RX ADMIN — GABAPENTIN 100 MG: 100 CAPSULE ORAL at 08:03

## 2019-03-04 RX ADMIN — DOCUSATE SODIUM 100 MG: 100 CAPSULE, LIQUID FILLED ORAL at 08:03

## 2019-03-04 RX ADMIN — VERAPAMIL HYDROCHLORIDE 240 MG: 240 TABLET, FILM COATED, EXTENDED RELEASE ORAL at 08:03

## 2019-03-04 RX ADMIN — HYDROCODONE BITARTRATE AND ACETAMINOPHEN 1 TABLET: 10; 325 TABLET ORAL at 12:03

## 2019-03-04 RX ADMIN — HYDROCODONE BITARTRATE AND ACETAMINOPHEN 1 TABLET: 10; 325 TABLET ORAL at 04:03

## 2019-03-04 RX ADMIN — ACETYLCYSTEINE 4 ML: 100 SOLUTION ORAL; RESPIRATORY (INHALATION) at 09:03

## 2019-03-04 RX ADMIN — ALPRAZOLAM 0.5 MG: 0.5 TABLET ORAL at 08:03

## 2019-03-04 RX ADMIN — LEVOTHYROXINE SODIUM 112 MCG: 112 TABLET ORAL at 06:03

## 2019-03-04 RX ADMIN — FUROSEMIDE 40 MG: 40 TABLET ORAL at 10:03

## 2019-03-04 RX ADMIN — LEVALBUTEROL 1.25 MG: 1.25 SOLUTION, CONCENTRATE RESPIRATORY (INHALATION) at 09:03

## 2019-03-04 RX ADMIN — FUROSEMIDE 40 MG: 40 TABLET ORAL at 06:03

## 2019-03-04 RX ADMIN — LEVALBUTEROL 1.25 MG: 1.25 SOLUTION, CONCENTRATE RESPIRATORY (INHALATION) at 10:03

## 2019-03-04 NOTE — TREATMENT PLAN
GI Treatment Plan    Makenzie Leija is a 78 y.o. female admitted to hospital 2/19/2019 (Hospital Day: 14) due to Acute on chronic respiratory failure with hypoxia.     Interval History  - called by primary team to re-evaluate due to concern for persistent bleeding.  - on history she reports persistent black stool, unsure if her stools ever returned to brown since being in the hospital. Denies any nausea, vomiting or abdominal pain  - endorses this morning chest tightness and dyspnea.  - H&H has been ~stable over preceding several days, 6.9 on 3/3, responded appropriately to 1u pRBC    Objective  Temp:  [97.4 °F (36.3 °C)-98.5 °F (36.9 °C)] 97.6 °F (36.4 °C) (03/04 0850)  Pulse:  [] 78 (03/04 0915)  BP: (100-146)/(57-76) 122/68 (03/04 0850)  Resp:  [14-24] 18 (03/04 0915)  SpO2:  [97 %-100 %] 99 % (03/04 0850)    General: Alert, Oriented x3, no distress. tachypneic.  Abdomen: Normoactive bowel sounds. Non-distended. Normal tympany. Soft. Non-tender. No peritoneal signs. YASMANY with melena.    Laboratory    Recent Labs   Lab 03/03/19  1950 03/04/19  0145 03/04/19  1102   HGB 6.9* 7.6* 8.1*       Lab Results   Component Value Date    WBC 11.71 03/04/2019    HGB 8.1 (L) 03/04/2019    HCT 26.0 (L) 03/04/2019    MCV 93 03/04/2019     03/04/2019       Lab Results   Component Value Date     03/04/2019    K 4.2 03/04/2019     03/04/2019    CO2 27 03/04/2019    BUN 45 (H) 03/04/2019    CREATININE 1.1 03/04/2019    CALCIUM 9.5 03/04/2019    ANIONGAP 9 03/04/2019    ESTGFRAFRICA 55.2 (A) 03/04/2019    EGFRNONAA 47.9 (A) 03/04/2019       Lab Results   Component Value Date    ALT 18 03/04/2019    AST 15 03/04/2019    ALKPHOS 86 03/04/2019    BILITOT 0.5 03/04/2019       Lab Results   Component Value Date    INR 0.9 03/03/2019    INR 0.9 03/02/2019    INR 1.0 03/01/2019       Plan  - recommend suppportive care at this time  - she underwent recent negative EGD/Colonoscopy (OSH), and repeat EGD (2/25)  negative; will need VCE, current respiratory status is tenuous as patient is dyspneic with chest pain and worsening dyspnea with brief supine for YASMANY  and would recommend supportive care and monitoring currently  - trend H&H, maintain Hgb > 7. Can space out H&H checks.  - maintain 2 large bore peripheral IV  - Plan of care was discussed with primary team.  - We will continue to follow.    Thank you for involving us in the care of Makenzie SCHWARTZ Leija. Please call with any additional questions, concerns or changes in the patient's clinical status.    Rafi Petit MD  Gastroenterology Fellow, PGY IV  Spectralink: [phone]

## 2019-03-04 NOTE — SUBJECTIVE & OBJECTIVE
Interval History:  patient with Chest tightness.  Breathing treatment didn't help.  Cardiac workup pending. Patient also had Hem Occult positive blood this AM.  GI re-consulted for potential colonoscopy    Review of Systems   Constitutional: Positive for appetite change and fatigue. Negative for chills and fever.   HENT: Negative for sore throat and trouble swallowing.    Eyes: Negative for pain and visual disturbance.   Respiratory: Positive for cough (dry), chest tightness and shortness of breath (on exertion).    Cardiovascular: Negative for chest pain and palpitations. Positive for chest tightness  Gastrointestinal: Negative for abdominal pain, blood in stool and nausea.   Genitourinary: Negative for difficulty urinating and dysuria.   Musculoskeletal: Negative for arthralgias and gait problem.   Neurological: Negative for dizziness and headaches.   Psychiatric/Behavioral: Negative for confusion. The patient is not nervous/anxious.      Objective:     Vital Signs (Most Recent):  Temp: 97.6 °F (36.4 °C) (03/04/19 0850)  Pulse: 78 (03/04/19 0915)  Resp: 18 (03/04/19 0915)  BP: 122/68 (03/04/19 0850)  SpO2: 99 % (03/04/19 0850) Vital Signs (24h Range):  Temp:  [97.4 °F (36.3 °C)-98.5 °F (36.9 °C)] 97.6 °F (36.4 °C)  Pulse:  [] 78  Resp:  [14-24] 18  SpO2:  [97 %-100 %] 99 %  BP: (100-146)/(57-76) 122/68     Weight: 89.5 kg (197 lb 5 oz)  Body mass index is 32.83 kg/m².    Intake/Output Summary (Last 24 hours) at 3/4/2019 1159  Last data filed at 3/4/2019 0400  Gross per 24 hour   Intake 750 ml   Output 800 ml   Net -50 ml      Physical Exam   Constitutional: She is oriented to person, place, and time. She appears well-developed. No distress.   HENT:   Head: Normocephalic and atraumatic.   Neck: Neck supple. No tracheal deviation present.   Cardiovascular: Normal rate, regular rhythm and intact distal pulses.   Murmur heard.   Systolic murmur is present.  Pulmonary/Chest: She has decreased breath sounds in the  right lower field. She has wheezes (mild diffuse wheezes).   On 3.5L nasal cannula, with SpO2 96%   Abdominal: Soft. She exhibits no distension.   Musculoskeletal: She exhibits edema (trace, bilateral lower extremities). She exhibits no deformity.   Neurological: She is alert and oriented to person, place, and time.   Skin: Skin is warm and dry.   Nursing note and vitals reviewed.      Significant Labs:   CBC:   Recent Labs   Lab 03/03/19  1950 03/04/19  0145 03/04/19  1102   WBC 11.42 16.60* 11.71   HGB 6.9* 7.6* 8.1*   HCT 22.8* 24.7* 26.0*    257 273     CMP:   Recent Labs   Lab 03/03/19  0500 03/04/19  0145    137   K 4.2 4.2   CL 98 101   CO2 29 27   * 126*   BUN 53* 45*   CREATININE 1.2 1.1   CALCIUM 9.5 9.5   PROT 6.2 6.2   ALBUMIN 2.8* 2.9*   BILITOT 0.3 0.5   ALKPHOS 88 86   AST 14 15   ALT 21 18   ANIONGAP 9 9   EGFRNONAA 43.1* 47.9*       Significant Imaging: I have reviewed all pertinent imaging results/findings within the past 24 hours.

## 2019-03-04 NOTE — ASSESSMENT & PLAN NOTE
Echo 2/19/2019  · Hypertrophic cardiomyopathy with asymmetric septal hypertrophy  · Normal left ventricular systolic function. The estimated ejection fraction is 65%  · Normal right ventricular systolic function.  · Severe left atrial enlargement.  · Systolic anterior motion of the mitral valve with severe outflow tract left ventricular obstruction. The peak gradient is near 100mm Hg.  · The estimated PA systolic pressure is 49 mm Hg  · Intermediate central venous pressure (8 mm Hg).     Plan:  - We are diuresing patient with 40PO lasix  -pt is preload dependent, so using conservative diuretic regimen  -switched to long acting metoprolol succinate 50 mg and Verapamil 240 (from 80) daily

## 2019-03-04 NOTE — PROGRESS NOTES
[facility] Hospital Medicine  Progress Note    Patient Name: Makenzie Leija  MRN: [id number]  Patient Class: IP- Inpatient   Admission Date: 2/19/2019  Length of Stay: 13 days  Attending Physician: Anaid Jeffries MD  Primary Care Provider: Karthik Roth MD    Hospital Medicine Team: [facility] O Cristy Verdin MD    Subjective:     Principal Problem:Acute on chronic respiratory failure with hypoxia    HPI:  Ms. 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      Per OSH records:  Pt presented on 2/5/29 via EMS for respiratory distress despite supplemental oxygen. Pt reports increased SOB over the last 4-5 days. On the day of admission significant SOB despite inhale and changing from oxygen compressor to tank. SpO2 75% on 3.5 L at home. Triage noted patient's SpO2 69% on 3L NC. Patient was placed on BiPaP w/ improvement in her respiratory function. CTA chest 2/7/19: without evidence of PE; moderate bilateral pleural effusions; RML consolidation. Thoracentesis 2/9/19: consistent with transudate. (thoracentesis 12/18 negative cytology). Patient was treated w/ antibiotics. Echo December 2018, IHSS, elevated peak gradient across LVOT at 179mmHg and a mean of 94mmHg. FELICIA performed. Patient remained in the hospital till 2/19/19; during which she was diuresed ~15L, started on BB yet remained symptomatic. And decision was made to transfer patient to [facility].           Hospital Course:  Transferred to [facility] for higher level of cardiac care. Admitted to [facility] for  acute diastolic heart failure HOCM with pulmonary edema and bilateral pleural effusions. Patient admitted 2/19/19. 2D echo repeated; septal size 2.2cm, ROSAURA with severe outflow tract left ventricular obstruction. The peak gradient is near 100mm Hg. Patient started on BB and CCB. Will need ablation as an outpatient.     2/21/19 patient h/h noted to be 7 down from 8.5 and subcutaneous ecchymosis noted around her lower abdomen. Lovenox stopped and patient was transferred to hospital medicine. EGD on 2/25/19 showed no acute findings, and no source of bleed.  2/26/19, patients Hg dropped to 6.9 and she received her second blood transfusion.  Patient started on heparin ggt.   2/27/19.  Patient had episode of dyspnea overnight with sats remaining >88. On Heparin ggt for bleeding with adequate hg trends. LE US showed no DVTs.  CXR showed increased pulmonary edema and R sided effusion appears increased. US of abdomen showed no ascities. Interventional cardiology came to assess patient in light of HOCM and being +symptomatic.   2/28/19: Patient desatted overnight, requiring 6L of O2 for SpO2 88-90%.  Patient with pitting edema and CXR that showed fluid, given 20Lasix and responded well (-2L). Patient had a nose bleed overnight and coughed a small amount of blood. Heparin ggt was stopped temporarily and restarted AM. Patient started on lasix ggt.  3/1: continued on lasix ggt and started on coumadin (as per patients decision)  3/2: transitioned to lasix IV 40 and tolerated well.    3/3: Hg dropped 6.8, transfused 1u  stopped coumadin and patient decided to go off all blood thinners.   3/4: patient with Chest tightness.  Breathing treatment didn't help.  Cardiac workup pending. Patient also had Hem Occult positive blood this AM.  GI re-consulted for potential colonoscopy    Interval History:  patient with Chest tightness.  Breathing treatment didn't help.  Cardiac workup pending. Patient also had Hem Occult positive blood this  AM.  GI re-consulted for potential colonoscopy    Review of Systems   Constitutional: Positive for appetite change and fatigue. Negative for chills and fever.   HENT: Negative for sore throat and trouble swallowing.    Eyes: Negative for pain and visual disturbance.   Respiratory: Positive for cough (dry), chest tightness and shortness of breath (on exertion).    Cardiovascular: Negative for chest pain and palpitations. Positive for chest tightness  Gastrointestinal: Negative for abdominal pain, blood in stool and nausea.   Genitourinary: Negative for difficulty urinating and dysuria.   Musculoskeletal: Negative for arthralgias and gait problem.   Neurological: Negative for dizziness and headaches.   Psychiatric/Behavioral: Negative for confusion. The patient is not nervous/anxious.      Objective:     Vital Signs (Most Recent):  Temp: 97.6 °F (36.4 °C) (03/04/19 0850)  Pulse: 78 (03/04/19 0915)  Resp: 18 (03/04/19 0915)  BP: 122/68 (03/04/19 0850)  SpO2: 99 % (03/04/19 0850) Vital Signs (24h Range):  Temp:  [97.4 °F (36.3 °C)-98.5 °F (36.9 °C)] 97.6 °F (36.4 °C)  Pulse:  [] 78  Resp:  [14-24] 18  SpO2:  [97 %-100 %] 99 %  BP: (100-146)/(57-76) 122/68     Weight: 89.5 kg (197 lb 5 oz)  Body mass index is 32.83 kg/m².    Intake/Output Summary (Last 24 hours) at 3/4/2019 1159  Last data filed at 3/4/2019 0400  Gross per 24 hour   Intake 750 ml   Output 800 ml   Net -50 ml      Physical Exam   Constitutional: She is oriented to person, place, and time. She appears well-developed. No distress.   HENT:   Head: Normocephalic and atraumatic.   Neck: Neck supple. No tracheal deviation present.   Cardiovascular: Normal rate, regular rhythm and intact distal pulses.   Murmur heard.   Systolic murmur is present.  Pulmonary/Chest: She has decreased breath sounds in the right lower field. She has wheezes (mild diffuse wheezes).   On 3.5L nasal cannula, with SpO2 96%   Abdominal: Soft. She exhibits no distension.    Musculoskeletal: She exhibits edema (trace, bilateral lower extremities). She exhibits no deformity.   Neurological: She is alert and oriented to person, place, and time.   Skin: Skin is warm and dry.   Nursing note and vitals reviewed.      Significant Labs:   CBC:   Recent Labs   Lab 03/03/19  1950 03/04/19  0145 03/04/19  1102   WBC 11.42 16.60* 11.71   HGB 6.9* 7.6* 8.1*   HCT 22.8* 24.7* 26.0*    257 273     CMP:   Recent Labs   Lab 03/03/19  0500 03/04/19  0145    137   K 4.2 4.2   CL 98 101   CO2 29 27   * 126*   BUN 53* 45*   CREATININE 1.2 1.1   CALCIUM 9.5 9.5   PROT 6.2 6.2   ALBUMIN 2.8* 2.9*   BILITOT 0.3 0.5   ALKPHOS 88 86   AST 14 15   ALT 21 18   ANIONGAP 9 9   EGFRNONAA 43.1* 47.9*       Significant Imaging: I have reviewed all pertinent imaging results/findings within the past 24 hours.       Assessment/Plan:      * Acute on chronic respiratory failure with hypoxia    - Patient acute respiratory failure is multifactorial secondary to: moderate COPD, pulmonary HTN (PA 49), HOCM, PE, MARISOL (CPAP at 4), bilateral pulmonary effusion, hx of RUL cancer s/p chemo&XRT, recently tx for CAP zosyn x 12 days   - treated for PNA at OSH, no signs of infection. No further txt required   - per OSH records patient not tolerating CPAP at night, would get hypoxic and was switched over to BiPAP  - pulmonary consulted; appreciate assistance but do not feel there is anything else to offer her inpatient  -patient continues to experience increasing dyspnea. KUB and Abdo US imaging show no GI etiology of abdominal distention, likely fluid.  CXR shows increasing pulmonary edema and R sided effusion  -patient received 20IV lasix 2/28 and tolerated well (UOP, 2L)  -2/28 switched to lasix ggt, 3/2 given 1 dose of IV with goal of transitioning to PO lasix      Plan  -40 PO lasix  -measure ins and outs closely  - will continue BiPAP qhs  -patient will need BiPAP on discharge (order placed)  - wean O2 as  able  -on discharge will need to have follow up with pulm and interventional cardiology         Acute blood loss anemia    - Patient w/ a history of ITP, bleeding diathesis, daughter with hemophilia B (implying pt is carrier), hx of bleeding.   - has been evaluated by hem/onc as outpatient and all workup was normal   - s/p 1 unit PRBCs  - hemoglobin 6.9 this a.m.  - EGD 2/25 showed a Normal esophagus, stomach and duodenum.  -Hg 6.8 on 3/3/19, and patient decided she wanted off blood thinners.  She was transfused 1u pRBC and went to 8.1 this AM    Plan:  -re-consulted GI, as patient had another dark tarry stool.   - Use a proton pump inhibitor PO daily prophylactically since patient will be on anticoagulation         Hypothyroidism    -patient has reported hypothyroidism  -TSH wnl    Plan  - continue home dose synthroid        Pulmonary HTN    - this is a chronic issue that is most likely exacerbating her SOB  - pulmonary artery systolic pressure (PASP)= 49  - Mean PAP can be approximated because mPAP = 0.61sPAP + 2  - Mean PAP= 31.89 (32)         HOCM (hypertrophic obstructive cardiomyopathy)    - Hypertrophic cardiomyopathy with asymmetric septal hypertrophy  - Systolic anterior motion of the mitral valve with severe outflow tract left ventricular obstruction. The peak gradient is near 100mm Hg.    Plan:  -Interventional cardiology saw patient and increased her medications to help with her symptoms, lower HR and increase ventricular filling time.   - will need outpatient interventional cardiology follow-up for ablation        Type 2 diabetes mellitus    -on Metformin at home and has great control (A1c was 5.6 on 2/19/19)  -patient has not required any SSI in last 48hrs  -BG remains in goal range    Plan  - Low dose insulin sliding scale   - bedside glucose monitoring         Pleural effusion, bilateral    - Thoracentesis 2/8 consistent w/ transudate (OSH studies)  - Thoracentesis 12/2018 cytology negative for  malignancy  - Bilateral effusions notes on CXR     Plan  -continue patient on lasix   -monitor ins and outs closely   -will monitor closely as patient is pre-load depended (given her HOCM)       Chronic pulmonary embolism    - Hx of RML embolus diagnosed 12/2018  - h/h down trending 6.9 (8.5 on admission)  - discontinued lovenox   - discussed with pulmonology and recommend starting on heparin drip to see if she tolerates, and then started on Xarelto as patient did not tolerate Eliquis  -patient was on coumadin and on a heparin ggt (to bridge) as she had a drop of her hg on xarelto but developed a bleed on 3/3.  Held    Plan:  -in light of patients GIB, patient wishes to stop anticoagulation. She understands the risks of developing another PE, but given her active bleeding problems, she will assume the clotting risk over continuing to have bloody BM     Acute on chronic diastolic congestive heart failure, NYHA class 3      Echo 2/19/2019  · Hypertrophic cardiomyopathy with asymmetric septal hypertrophy  · Normal left ventricular systolic function. The estimated ejection fraction is 65%  · Normal right ventricular systolic function.  · Severe left atrial enlargement.  · Systolic anterior motion of the mitral valve with severe outflow tract left ventricular obstruction. The peak gradient is near 100mm Hg.  · The estimated PA systolic pressure is 49 mm Hg  · Intermediate central venous pressure (8 mm Hg).     Plan:  - We are diuresing patient with 40PO lasix  -pt is preload dependent, so using conservative diuretic regimen  -switched to long acting metoprolol succinate 50 mg and Verapamil 240 (from 80) daily       COPD (chronic obstructive pulmonary disease)    - continue LAMA/ICS/LABA    Plan:  -will switch duo nebs to levalbuterol neb solution  -will continue to monitor closely and will d/c with follow up with Pulmonology         VTE Risk Mitigation (From admission, onward)    None              Cristy Verdin,  MD  Department of Hospital Medicine   [facility]

## 2019-03-04 NOTE — ASSESSMENT & PLAN NOTE
- Thoracentesis 2/8 consistent w/ transudate (OSH studies)  - Thoracentesis 12/2018 cytology negative for malignancy  - Bilateral effusions notes on CXR     Plan  -continue patient on lasix   -monitor ins and outs closely   -will monitor closely as patient is pre-load depended (given her HOCM)

## 2019-03-04 NOTE — NURSING
Patient has SOB during shift change. Respiratory and team notified. Blood Bank denied reserving type and screen specimen., lab re obtained and sent. Patient H/H decreased, 1u of RBC administered H&H increased. Sstool obtained for possible blood. Vs stable WNL. Will continue monitor.

## 2019-03-04 NOTE — ASSESSMENT & PLAN NOTE
- Patient acute respiratory failure is multifactorial secondary to: moderate COPD, pulmonary HTN (PA 49), HOCM, PE, MARISOL (CPAP at 4), bilateral pulmonary effusion, hx of RUL cancer s/p chemo&XRT, recently tx for CAP zosyn x 12 days   - treated for PNA at OSH, no signs of infection. No further txt required   - per OSH records patient not tolerating CPAP at night, would get hypoxic and was switched over to BiPAP  - pulmonary consulted; appreciate assistance but do not feel there is anything else to offer her inpatient  -patient continues to experience increasing dyspnea. KUB and Abdo US imaging show no GI etiology of abdominal distention, likely fluid.  CXR shows increasing pulmonary edema and R sided effusion  -patient received 20IV lasix 2/28 and tolerated well (UOP, 2L)  -2/28 switched to lasix ggt, 3/2 given 1 dose of IV with goal of transitioning to PO lasix      Plan  -40 PO lasix  -measure ins and outs closely  - will continue BiPAP qhs  -patient will need BiPAP on discharge (order placed)  - wean O2 as able  -on discharge will need to have follow up with pulm and interventional cardiology

## 2019-03-04 NOTE — PLAN OF CARE
Problem: Adult Inpatient Plan of Care  Goal: Plan of Care Review  Outcome: Ongoing (interventions implemented as appropriate)  Pt free of fall this shift. Sob and chest tightness verbalized by pt and MD notified. MD order chest xray, ekg and lasix. Pain assessed an pt c/o of back pain; prn norco given and pt verbalized moderate relief of pain. No skin breakdown noted. Cbg monitored to manage pt's DM2 and no insulin needed. Pt aaox4. Afebrile. vss and pt verbalized sob subsided. Will continue to monitor pt.

## 2019-03-04 NOTE — ASSESSMENT & PLAN NOTE
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   - has been evaluated by hem/onc as outpatient and all workup was normal   - s/p 1 unit PRBCs  - hemoglobin 6.9 this a.m.  - EGD 2/25 showed a Normal esophagus, stomach and duodenum.  -Hg 6.8 on 3/3/19, and patient decided she wanted off blood thinners.  She was transfused 1u pRBC and went to 8.1 this AM    Plan:  -re-consulted GI, as patient had another dark tarry stool.   - Use a proton pump inhibitor PO daily prophylactically since patient will be on anticoagulation

## 2019-03-04 NOTE — ASSESSMENT & PLAN NOTE
- Hx of RML embolus diagnosed 12/2018  - h/h down trending 6.9 (8.5 on admission)  - discontinued lovenox   - discussed with pulmonology and recommend starting on heparin drip to see if she tolerates, and then started on Xarelto as patient did not tolerate Eliquis  -patient was on coumadin and on a heparin ggt (to bridge) as she had a drop of her hg on xarelto but developed a bleed on 3/3.  Held    Plan:  -in light of patients GIB, patient wishes to stop anticoagulation. She understands the risks of developing another PE, but given her active bleeding problems, she will assume the clotting risk over continuing to have bloody BM

## 2019-03-05 LAB
ALBUMIN SERPL BCP-MCNC: 3 G/DL
ALP SERPL-CCNC: 78 U/L
ALT SERPL W/O P-5'-P-CCNC: 14 U/L
ANION GAP SERPL CALC-SCNC: 10 MMOL/L
AST SERPL-CCNC: 12 U/L
BASOPHILS # BLD AUTO: 0.06 K/UL
BASOPHILS NFR BLD: 0.7 %
BILIRUB SERPL-MCNC: 0.5 MG/DL
BUN SERPL-MCNC: 49 MG/DL
CALCIUM SERPL-MCNC: 9.5 MG/DL
CHLORIDE SERPL-SCNC: 100 MMOL/L
CO2 SERPL-SCNC: 28 MMOL/L
CREAT SERPL-MCNC: 1.2 MG/DL
DIFFERENTIAL METHOD: ABNORMAL
EOSINOPHIL # BLD AUTO: 0.6 K/UL
EOSINOPHIL NFR BLD: 6.8 %
ERYTHROCYTE [DISTWIDTH] IN BLOOD BY AUTOMATED COUNT: 15.9 %
EST. GFR  (AFRICAN AMERICAN): 49.7 ML/MIN/1.73 M^2
EST. GFR  (NON AFRICAN AMERICAN): 43.1 ML/MIN/1.73 M^2
GLUCOSE SERPL-MCNC: 131 MG/DL
HCT VFR BLD AUTO: 24.6 %
HGB BLD-MCNC: 7.5 G/DL
IMM GRANULOCYTES # BLD AUTO: 0.12 K/UL
IMM GRANULOCYTES NFR BLD AUTO: 1.4 %
LYMPHOCYTES # BLD AUTO: 0.9 K/UL
LYMPHOCYTES NFR BLD: 10.8 %
MAGNESIUM SERPL-MCNC: 2.1 MG/DL
MCH RBC QN AUTO: 28.4 PG
MCHC RBC AUTO-ENTMCNC: 30.5 G/DL
MCV RBC AUTO: 93 FL
MONOCYTES # BLD AUTO: 0.9 K/UL
MONOCYTES NFR BLD: 10.9 %
NEUTROPHILS # BLD AUTO: 5.9 K/UL
NEUTROPHILS NFR BLD: 69.4 %
NRBC BLD-RTO: 0 /100 WBC
PLATELET # BLD AUTO: 240 K/UL
PMV BLD AUTO: 9.9 FL
POCT GLUCOSE: 117 MG/DL (ref 70–110)
POCT GLUCOSE: 126 MG/DL (ref 70–110)
POCT GLUCOSE: 132 MG/DL (ref 70–110)
POCT GLUCOSE: 200 MG/DL (ref 70–110)
POTASSIUM SERPL-SCNC: 4 MMOL/L
PROT SERPL-MCNC: 6.3 G/DL
RBC # BLD AUTO: 2.64 M/UL
SODIUM SERPL-SCNC: 138 MMOL/L
WBC # BLD AUTO: 8.44 K/UL

## 2019-03-05 PROCEDURE — 25000242 PHARM REV CODE 250 ALT 637 W/ HCPCS: Performed by: STUDENT IN AN ORGANIZED HEALTH CARE EDUCATION/TRAINING PROGRAM

## 2019-03-05 PROCEDURE — 83735 ASSAY OF MAGNESIUM: CPT

## 2019-03-05 PROCEDURE — 94761 N-INVAS EAR/PLS OXIMETRY MLT: CPT

## 2019-03-05 PROCEDURE — 27100171 HC OXYGEN HIGH FLOW UP TO 24 HOURS

## 2019-03-05 PROCEDURE — 11000001 HC ACUTE MED/SURG PRIVATE ROOM

## 2019-03-05 PROCEDURE — 25000003 PHARM REV CODE 250: Performed by: INTERNAL MEDICINE

## 2019-03-05 PROCEDURE — 94660 CPAP INITIATION&MGMT: CPT

## 2019-03-05 PROCEDURE — 99232 SBSQ HOSP IP/OBS MODERATE 35: CPT | Mod: ,,, | Performed by: INTERNAL MEDICINE

## 2019-03-05 PROCEDURE — 25000003 PHARM REV CODE 250: Performed by: HOSPITALIST

## 2019-03-05 PROCEDURE — 25000003 PHARM REV CODE 250: Performed by: STUDENT IN AN ORGANIZED HEALTH CARE EDUCATION/TRAINING PROGRAM

## 2019-03-05 PROCEDURE — 99900035 HC TECH TIME PER 15 MIN (STAT)

## 2019-03-05 PROCEDURE — 94640 AIRWAY INHALATION TREATMENT: CPT

## 2019-03-05 PROCEDURE — 85025 COMPLETE CBC W/AUTO DIFF WBC: CPT

## 2019-03-05 PROCEDURE — 99232 PR SUBSEQUENT HOSPITAL CARE,LEVL II: ICD-10-PCS | Mod: ,,, | Performed by: INTERNAL MEDICINE

## 2019-03-05 PROCEDURE — 80053 COMPREHEN METABOLIC PANEL: CPT

## 2019-03-05 PROCEDURE — 27000221 HC OXYGEN, UP TO 24 HOURS

## 2019-03-05 RX ADMIN — TIOTROPIUM BROMIDE 18 MCG: 18 CAPSULE ORAL; RESPIRATORY (INHALATION) at 08:03

## 2019-03-05 RX ADMIN — HYDROCODONE BITARTRATE AND ACETAMINOPHEN 1 TABLET: 10; 325 TABLET ORAL at 12:03

## 2019-03-05 RX ADMIN — FLUTICASONE PROPIONATE 50 MCG: 50 SPRAY, METERED NASAL at 08:03

## 2019-03-05 RX ADMIN — MONTELUKAST SODIUM 10 MG: 10 TABLET, FILM COATED ORAL at 09:03

## 2019-03-05 RX ADMIN — ALPRAZOLAM 0.5 MG: 0.5 TABLET ORAL at 09:03

## 2019-03-05 RX ADMIN — FUROSEMIDE 40 MG: 40 TABLET ORAL at 05:03

## 2019-03-05 RX ADMIN — LEVALBUTEROL 1.25 MG: 1.25 SOLUTION, CONCENTRATE RESPIRATORY (INHALATION) at 09:03

## 2019-03-05 RX ADMIN — GABAPENTIN 100 MG: 100 CAPSULE ORAL at 09:03

## 2019-03-05 RX ADMIN — PANTOPRAZOLE SODIUM 40 MG: 40 TABLET, DELAYED RELEASE ORAL at 08:03

## 2019-03-05 RX ADMIN — HYDROCODONE BITARTRATE AND ACETAMINOPHEN 1 TABLET: 10; 325 TABLET ORAL at 09:03

## 2019-03-05 RX ADMIN — GABAPENTIN 100 MG: 100 CAPSULE ORAL at 08:03

## 2019-03-05 RX ADMIN — METOPROLOL SUCCINATE 100 MG: 100 TABLET, EXTENDED RELEASE ORAL at 08:03

## 2019-03-05 RX ADMIN — VERAPAMIL HYDROCHLORIDE 240 MG: 240 TABLET, FILM COATED, EXTENDED RELEASE ORAL at 10:03

## 2019-03-05 RX ADMIN — FUROSEMIDE 40 MG: 40 TABLET ORAL at 08:03

## 2019-03-05 RX ADMIN — HYDROCODONE BITARTRATE AND ACETAMINOPHEN 1 TABLET: 10; 325 TABLET ORAL at 10:03

## 2019-03-05 RX ADMIN — LEVOTHYROXINE SODIUM 112 MCG: 112 TABLET ORAL at 06:03

## 2019-03-05 NOTE — PROGRESS NOTES
Hospital Medicine   Progress note   Team: [facility]OSP MED O Corina Dias MD   Admit Date: 2/19/2019   JERRY    Code status: Full Code   Principal Problem: Acute on chronic respiratory failure with hypoxia     Hospital Course:  Transferred to [facility] for higher level of cardiac care. Admitted to [facility] for acute diastolic heart failure HOCM with pulmonary edema and bilateral pleural effusions. Patient admitted 2/19/19. 2D echo repeated; septal size 2.2cm, ROSAURA with severe outflow tract left ventricular obstruction. The peak gradient is near 100mm Hg. Patient started on BB and CCB. Will need ablation as an outpatient.     2/21/19 patient h/h noted to be 7 down from 8.5 and subcutaneous ecchymosis noted around her lower abdomen. Lovenox stopped and patient was transferred to hospital medicine. EGD on 2/25/19 showed no acute findings, and no source of bleed.  2/26/19, patients Hg dropped to 6.9 and she received her second blood transfusion.  Patient started on heparin ggt.   2/27/19.  Patient had episode of dyspnea overnight with sats remaining >88. On Heparin ggt for bleeding with adequate hg trends. LE US showed no DVTs.  CXR showed increased pulmonary edema and R sided effusion appears increased. US of abdomen showed no ascities. Interventional cardiology came to assess patient in light of HOCM and being +symptomatic.   2/28/19: Patient desatted overnight, requiring 6L of O2 for SpO2 88-90%.  Patient with pitting edema and CXR that showed fluid, given 20Lasix and responded well (-2L). Patient had a nose bleed overnight and coughed a small amount of blood. Heparin ggt was stopped temporarily and restarted AM. Patient started on lasix ggt.  3/1: continued on lasix ggt and started on coumadin (as per patients decision)  3/2: transitioned to lasix IV 40 and tolerated well.    3/3: Hg dropped 6.8, transfused 1u  stopped coumadin and patient decided to go off all blood thinners.   3/4: patient with Chest tightness.   Breathing treatment didn't help.  Cardiac workup pending. Patient also had Hem Occult positive blood this AM.  GI re-consulted for potential colonoscopy    Interval hx:  No events overnight.  Patient does note a black bowel movement.  Hemoglobin 7.5 this a.m..  GI planning possible VCE on 3/7.  O2 sats stable on 4 L nasal cannula.      ROS   Constitutional: Negative for chills, fever. +fatigue  HENT: Negative for sore throat, trouble swallowing.  +nosebleeds  Eyes: Negative for photophobia, visual disturbance.   Respiratory: +cough, +shortness of breath.    Cardiovascular: Negative for chest pain, palpitations, leg swelling.   Gastrointestinal: Negative for abdominal pain, constipation, diarrhea, nausea, vomiting. +melena  Endocrine: Negative for cold intolerance, heat intolerance.   Genitourinary: Negative for dysuria, frequency.   Musculoskeletal: Negative for arthralgias, myalgias.   Skin: Negative for rash, erythema   Neurological: Negative for dizziness, syncope, light-headedness. +weakness  Psychiatric/Behavioral: Negative for confusion, hallucinations, +anxiety  All other systems reviewed and are negative.    PEx   Temp:  [98.5 °F (36.9 °C)-98.9 °F (37.2 °C)]   Pulse:  [54-80]   Resp:  [14-20]   BP: (106-134)/(62-76)   SpO2:  [96 %-99 %]      I & O (Last 24H):     Intake/Output Summary (Last 24 hours) at 3/5/2019 1401  Last data filed at 3/5/2019 0600  Gross per 24 hour   Intake 360 ml   Output 900 ml   Net -540 ml       General appearance: no distress, alert and oriented x 3, chronically ill-appearing  HEENT:  conjunctivae/corneas clear, PERRL, mucous membranes moist, [place]  Neck: supple, thyroid not enlarged  Pulm:   normal respiratory effort, decreased breath sounds in bases, difficult to assess 2/2 body habitus, no crackles or wheezes, no rhonchi, + O2 via nasal cannula @4L  Card: RRR, S1, S2 normal, no murmur, click, rub or gallop  Abd: soft, NT, ND, BS present; no masses, no organomegaly  Ext: no  c/c/e  Pulses: 2+, symmetric  Skin: color, texture, turgor normal.   Neuro: CN II-XII grossly intact, no focal numbness or weakness, normal strength and tone   Psych: normal mood and affect      Recent Results (from the past 24 hour(s))   POCT glucose    Collection Time: 03/04/19  4:16 PM   Result Value Ref Range    POCT Glucose 174 (H) 70 - 110 mg/dL   POCT glucose    Collection Time: 03/04/19  9:03 PM   Result Value Ref Range    POCT Glucose 200 (H) 70 - 110 mg/dL   CBC auto differential    Collection Time: 03/05/19  4:03 AM   Result Value Ref Range    WBC 8.44 3.90 - 12.70 K/uL    RBC 2.64 (L) 4.00 - 5.40 M/uL    Hemoglobin 7.5 (L) 12.0 - 16.0 g/dL    Hematocrit 24.6 (L) 37.0 - 48.5 %    MCV 93 82 - 98 fL    MCH 28.4 27.0 - 31.0 pg    MCHC 30.5 (L) 32.0 - 36.0 g/dL    RDW 15.9 (H) 11.5 - 14.5 %    Platelets 240 150 - 350 K/uL    MPV 9.9 9.2 - 12.9 fL    Immature Granulocytes 1.4 (H) 0.0 - 0.5 %    Gran # (ANC) 5.9 1.8 - 7.7 K/uL    Immature Grans (Abs) 0.12 (H) 0.00 - 0.04 K/uL    Lymph # 0.9 (L) 1.0 - 4.8 K/uL    Mono # 0.9 0.3 - 1.0 K/uL    Eos # 0.6 (H) 0.0 - 0.5 K/uL    Baso # 0.06 0.00 - 0.20 K/uL    nRBC 0 0 /100 WBC    Gran% 69.4 38.0 - 73.0 %    Lymph% 10.8 (L) 18.0 - 48.0 %    Mono% 10.9 4.0 - 15.0 %    Eosinophil% 6.8 0.0 - 8.0 %    Basophil% 0.7 0.0 - 1.9 %    Differential Method Automated    Comprehensive metabolic panel    Collection Time: 03/05/19  4:03 AM   Result Value Ref Range    Sodium 138 136 - 145 mmol/L    Potassium 4.0 3.5 - 5.1 mmol/L    Chloride 100 95 - 110 mmol/L    CO2 28 23 - 29 mmol/L    Glucose 131 (H) 70 - 110 mg/dL    BUN, Bld 49 (H) 8 - 23 mg/dL    Creatinine 1.2 0.5 - 1.4 mg/dL    Calcium 9.5 8.7 - 10.5 mg/dL    Total Protein 6.3 6.0 - 8.4 g/dL    Albumin 3.0 (L) 3.5 - 5.2 g/dL    Total Bilirubin 0.5 0.1 - 1.0 mg/dL    Alkaline Phosphatase 78 55 - 135 U/L    AST 12 10 - 40 U/L    ALT 14 10 - 44 U/L    Anion Gap 10 8 - 16 mmol/L    eGFR if African American 49.7 (A) >60  mL/min/1.73 m^2    eGFR if non  43.1 (A) >60 mL/min/1.73 m^2   Magnesium    Collection Time: 03/05/19  4:03 AM   Result Value Ref Range    Magnesium 2.1 1.6 - 2.6 mg/dL   POCT glucose    Collection Time: 03/05/19  7:42 AM   Result Value Ref Range    POCT Glucose 126 (H) 70 - 110 mg/dL   POCT glucose    Collection Time: 03/05/19 11:16 AM   Result Value Ref Range    POCT Glucose 117 (H) 70 - 110 mg/dL       Recent Labs   Lab 03/04/19  0711 03/04/19  1146 03/04/19  1616 03/04/19  2103 03/05/19  0742 03/05/19  1116   POCTGLUCOSE 186* 189* 174* 200* 126* 117*       Hemoglobin A1C   Date Value Ref Range Status   02/19/2019 5.6 4.0 - 5.6 % Final     Comment:     ADA Screening Guidelines:  5.7-6.4%  Consistent with prediabetes  >or=6.5%  Consistent with diabetes  High levels of fetal hemoglobin interfere with the HbA1C  assay. Heterozygous hemoglobin variants (HbS, HgC, etc)do  not significantly interfere with this assay.   However, presence of multiple variants may affect accuracy.          Active Hospital Problems    Diagnosis  POA    *Acute on chronic respiratory failure with hypoxia [J96.21]  Yes    Acute blood loss anemia [D62]  Yes    HOCM (hypertrophic obstructive cardiomyopathy) [I42.1]  Yes    Acute on chronic diastolic congestive heart failure, NYHA class 3 [I50.33]  Yes    Pulmonary HTN [I27.20]  Yes    Hypothyroidism [E03.9]  Yes    Type 2 diabetes mellitus [E11.9]  Yes    Chronic pulmonary embolism [I27.82]  Yes    Pleural effusion, bilateral [J90]  Yes    COPD (chronic obstructive pulmonary disease) [J44.9]  Yes      Resolved Hospital Problems   No resolved problems to display.         docusate sodium  100 mg Oral Daily    fluticasone  1 spray Each Nare Daily    fluticasone-vilanterol  1 puff Inhalation Daily    furosemide  40 mg Oral BID    gabapentin  100 mg Oral BID    levothyroxine  112 mcg Oral Before breakfast    metoprolol succinate  100 mg Oral Daily    montelukast  10  mg Oral QHS    pantoprazole  40 mg Oral Daily    tiotropium  1 capsule Inhalation Daily    verapamil  240 mg Oral Nightly     sodium chloride, acetaminophen, ALPRAZolam, dextrose 50%, dextrose 50%, glucagon (human recombinant), glucose, glucose, HYDROcodone-acetaminophen, insulin aspart U-100, levalbuterol, sodium chloride 0.9%      Assessment and Plan for problems addressed today:   1. Acute on chronic respiratory failure  - Patient acute respiratory failure is multifactorial secondary to: moderate COPD, pulmonary HTN (PA 49), HOCM, PE, MARISOL (CPAP at 4), bilateral pulmonary effusion, hx of RUL cancer s/p chemo&XRT, recently tx for CAP zosyn x 12 days   - treated for PNA at OSH, no signs of infection. No further txt required   - per OSH records patient not tolerating CPAP at night, would get hypoxic and was switched over to BiPAP  - will continue BiPAP qhs  - pulmonary consulted; appreciate assistance - signed off   - wean O2 as able  - IS     2. 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   - has been evaluated by hem/onc as outpatient and all workup was normal   - s/p 1 unit PRBCs  - CBC q12  - hemoglobin 7.5 this a.m.  - EGD nonacute  Impression:           - Normal esophagus.                        - Normal stomach.                        - Normal examined duodenum.                        - No specimens collected.  Recommendation:       - Return patient to [facility] for ongoing care.                        - Use a proton pump inhibitor PO daily                         prophylactically if planning on anticoagulation.                        - The findings and recommendations were discussed                         with the designated responsible adult.  - GI following  - recommend outpatient colonoscopy  - possible VCE    3. Hypothyroidism  - continue home dose synthroid      4. Pulmonary HTN  - pulmonary  artery systolic pressure (PASP)= 49  - Mean PAP can be approximated because mPAP = 0.61sPAP + 2  - Mean PAP= 31.89 (32)     5. HOCM (hypertrophic obstructive cardiomyopathy)  - Hypertrophic cardiomyopathy with asymmetric septal hypertrophy  - Systolic anterior motion of the mitral valve with severe outflow tract left ventricular obstruction. The peak gradient is near 100mm Hg.  - continue BB and CCB  - uptitrate medications as tolerated   - cards following   - will need outpatient interventional cardiology follow-up for ablation      6. MARISOL on CPAP  - BiPAP qhs 12/8     7. Type 2 diabetes mellitus  - Low dose insulin sliding scale   - bedside glucose monitoring      8. Pleural effusion, bilateral  - Thoracentesis 2/8 consistent w/ transudate (OSH studies)  - Thoracentesis 12/2018 cytology negative for malignancy  - Bilateral effusions notes on CXR      9. Chronic pulmonary embolism  - Hx of RML embolus diagnosed 12/2018  - h/h down trending 7.0 (8.5 on admission)  - discontinued lovenox   - discussed with pulmonology and recommended anticoagulation but unfortunately patient did not tolerate and developed bleeding  - holding anticoagulation for now until figure out source of bleed     10. Acute on chronic diastolic congestive heart failure, NYHA class 3    Echo 2/19/2019  · Hypertrophic cardiomyopathy with asymmetric septal hypertrophy  · Normal left ventricular systolic function. The estimated ejection fraction is 65%  · Normal right ventricular systolic function.  · Severe left atrial enlargement.  · Systolic anterior motion of the mitral valve with severe outflow tract left ventricular obstruction. The peak gradient is near 100mm Hg.  · The estimated PA systolic pressure is 49 mm Hg  · Intermediate central venous pressure (8 mm Hg).     - continue Lasix 40 b.i.d.  - continue BB and CCB     11. COPD (chronic obstructive pulmonary disease)  - continue LAMA/ICS/LABA  - pulm following, appreciate recs   - duonebs q6  hrs     12. Anxiety  - on home xanax, will resume       Diet:  Cardiac  DVT PPx: CI 2/2 GI bleed  Code status:  Full    Discharge plan and follow up:  Still having dark bowel movements overnight, hemoglobin at 7.5, respiratory status is stable, GI planning possible VCE on 03/07    Corina Dias MD  [facility] Medicine Staff  [phone] pager

## 2019-03-05 NOTE — NURSING
At the beginning of the shift vital signs stable.  Afebrile.  Awake, alert, and oriented.  Pupils - WALE and glassy.  Patient stated she have had several surgeries to her eyes.  No complaint of chest pain or shortness of breath.  Abdomen soft.  One large brownish stool this shift per bed side commode.  Voiding per purewick external catheter. Alprazolam given p.o. Prior to cRAR being connected.  Pain medication given once for complaints of lower back pain.   Daughter at the bedside.  Vital signs remained stable throughout the shift.  Remained free of falls or incidents. Uneventful shift.

## 2019-03-05 NOTE — PT/OT/SLP PROGRESS
"     Physical Therapy  Pt Not Seen    Patient Name:  Makenzie SCHWARTZ Leija   MRN:  [id number]    Patient not seen today secondary to  Other (Comment)(2 attempts for tx session: 1. Pt with c/o of not breathing well "They need to give me a breathing tx" 9:33 am; 2. Pt found [facility] and daughter present in the room stating "her blood count is low, she's weak"  Pt and daughter were encouraged to perform OOB mobility as tolerated 3:08 pm). Will follow-up on next scheduled visit.    Estelle Ramos, PTA  3/4/2019      "

## 2019-03-05 NOTE — TREATMENT PLAN
GI Treatment Plan    Makenzie Leija is a 78 y.o. female admitted to hospital 2/19/2019 (Hospital Day: 15) due to Acute on chronic respiratory failure with hypoxia.     Interval History  - H&h stable    Objective  Temp:  [98.5 °F (36.9 °C)-98.9 °F (37.2 °C)] 98.5 °F (36.9 °C) (03/05 0359)  Pulse:  [54-80] 72 (03/05 0900)  BP: (106-134)/(62-76) 134/74 (03/05 0900)  Resp:  [14-20] 16 (03/05 0900)  SpO2:  [96 %-99 %] 99 % (03/05 0900)    Laboratory    Recent Labs   Lab 03/04/19  0145 03/04/19  1102 03/05/19  0403   HGB 7.6* 8.1* 7.5*       Lab Results   Component Value Date    WBC 8.44 03/05/2019    HGB 7.5 (L) 03/05/2019    HCT 24.6 (L) 03/05/2019    MCV 93 03/05/2019     03/05/2019       Lab Results   Component Value Date     03/05/2019    K 4.0 03/05/2019     03/05/2019    CO2 28 03/05/2019    BUN 49 (H) 03/05/2019    CREATININE 1.2 03/05/2019    CALCIUM 9.5 03/05/2019    ANIONGAP 10 03/05/2019    ESTGFRAFRICA 49.7 (A) 03/05/2019    EGFRNONAA 43.1 (A) 03/05/2019       Lab Results   Component Value Date    ALT 14 03/05/2019    AST 12 03/05/2019    ALKPHOS 78 03/05/2019    BILITOT 0.5 03/05/2019       Lab Results   Component Value Date    INR 0.9 03/03/2019    INR 0.9 03/02/2019    INR 1.0 03/01/2019       Plan  - H&H stable, saturating well on 4L NC  - please place on clear liquid diet on 3/6, will re-evaluate for potential VCE on 3/7  - Plan of care was discussed with primary team.  - We will continue to follow.    Thank you for involving us in the care of Makenzie Leija. Please call with any additional questions, concerns or changes in the patient's clinical status.    Rafi Petit MD  Gastroenterology Fellow, PGY IV  Spectralink: [phone]

## 2019-03-05 NOTE — PLAN OF CARE
Problem: Pain Acute  Goal: Optimal Pain Control    Intervention: Prevent or Manage Pain   03/05/19 0125   Prevent or Manage Pain   Sensory Stimulation Regulation music/television provided for stimulation   Sleep/Rest Enhancement awakenings minimized

## 2019-03-06 LAB
ALBUMIN SERPL BCP-MCNC: 3 G/DL
ALP SERPL-CCNC: 77 U/L
ALT SERPL W/O P-5'-P-CCNC: 17 U/L
ANION GAP SERPL CALC-SCNC: 9 MMOL/L
AST SERPL-CCNC: 13 U/L
BASOPHILS # BLD AUTO: 0.06 K/UL
BASOPHILS NFR BLD: 0.8 %
BASOPHILS NFR BLD: 0.8 %
BASOPHILS NFR BLD: 0.9 %
BILIRUB SERPL-MCNC: 0.6 MG/DL
BUN SERPL-MCNC: 42 MG/DL
CALCIUM SERPL-MCNC: 9.4 MG/DL
CHLORIDE SERPL-SCNC: 98 MMOL/L
CO2 SERPL-SCNC: 30 MMOL/L
CREAT SERPL-MCNC: 1 MG/DL
DIFFERENTIAL METHOD: ABNORMAL
EOSINOPHIL # BLD AUTO: 0.3 K/UL
EOSINOPHIL # BLD AUTO: 0.3 K/UL
EOSINOPHIL # BLD AUTO: 0.4 K/UL
EOSINOPHIL NFR BLD: 3.6 %
EOSINOPHIL NFR BLD: 3.6 %
EOSINOPHIL NFR BLD: 5 %
ERYTHROCYTE [DISTWIDTH] IN BLOOD BY AUTOMATED COUNT: 15.8 %
ERYTHROCYTE [DISTWIDTH] IN BLOOD BY AUTOMATED COUNT: 15.8 %
ERYTHROCYTE [DISTWIDTH] IN BLOOD BY AUTOMATED COUNT: 15.9 %
EST. GFR  (AFRICAN AMERICAN): >60 ML/MIN/1.73 M^2
EST. GFR  (NON AFRICAN AMERICAN): 53.7 ML/MIN/1.73 M^2
GLUCOSE SERPL-MCNC: 112 MG/DL
HCT VFR BLD AUTO: 24.7 %
HCT VFR BLD AUTO: 26.4 %
HCT VFR BLD AUTO: 26.4 %
HGB BLD-MCNC: 7.5 G/DL
HGB BLD-MCNC: 8.2 G/DL
HGB BLD-MCNC: 8.2 G/DL
IMM GRANULOCYTES # BLD AUTO: 0.04 K/UL
IMM GRANULOCYTES # BLD AUTO: 0.05 K/UL
IMM GRANULOCYTES # BLD AUTO: 0.05 K/UL
IMM GRANULOCYTES NFR BLD AUTO: 0.6 %
LYMPHOCYTES # BLD AUTO: 0.8 K/UL
LYMPHOCYTES NFR BLD: 10.5 %
LYMPHOCYTES NFR BLD: 10.5 %
LYMPHOCYTES NFR BLD: 11.9 %
MAGNESIUM SERPL-MCNC: 2 MG/DL
MCH RBC QN AUTO: 28.6 PG
MCH RBC QN AUTO: 28.6 PG
MCH RBC QN AUTO: 28.7 PG
MCHC RBC AUTO-ENTMCNC: 30.4 G/DL
MCHC RBC AUTO-ENTMCNC: 31.1 G/DL
MCHC RBC AUTO-ENTMCNC: 31.1 G/DL
MCV RBC AUTO: 92 FL
MCV RBC AUTO: 92 FL
MCV RBC AUTO: 95 FL
MONOCYTES # BLD AUTO: 0.8 K/UL
MONOCYTES NFR BLD: 10.4 %
MONOCYTES NFR BLD: 10.4 %
MONOCYTES NFR BLD: 11.5 %
NEUTROPHILS # BLD AUTO: 4.9 K/UL
NEUTROPHILS # BLD AUTO: 5.8 K/UL
NEUTROPHILS # BLD AUTO: 5.8 K/UL
NEUTROPHILS NFR BLD: 70.1 %
NEUTROPHILS NFR BLD: 74.1 %
NEUTROPHILS NFR BLD: 74.1 %
NRBC BLD-RTO: 0 /100 WBC
PLATELET # BLD AUTO: 239 K/UL
PLATELET # BLD AUTO: 258 K/UL
PLATELET # BLD AUTO: 258 K/UL
PMV BLD AUTO: 10.2 FL
PMV BLD AUTO: 9.9 FL
PMV BLD AUTO: 9.9 FL
POCT GLUCOSE: 133 MG/DL (ref 70–110)
POCT GLUCOSE: 149 MG/DL (ref 70–110)
POCT GLUCOSE: 181 MG/DL (ref 70–110)
POTASSIUM SERPL-SCNC: 3.9 MMOL/L
PROT SERPL-MCNC: 6.2 G/DL
RBC # BLD AUTO: 2.61 M/UL
RBC # BLD AUTO: 2.87 M/UL
RBC # BLD AUTO: 2.87 M/UL
SODIUM SERPL-SCNC: 137 MMOL/L
WBC # BLD AUTO: 6.98 K/UL
WBC # BLD AUTO: 7.81 K/UL
WBC # BLD AUTO: 7.81 K/UL

## 2019-03-06 PROCEDURE — 25000003 PHARM REV CODE 250: Performed by: INTERNAL MEDICINE

## 2019-03-06 PROCEDURE — 80053 COMPREHEN METABOLIC PANEL: CPT

## 2019-03-06 PROCEDURE — 99232 PR SUBSEQUENT HOSPITAL CARE,LEVL II: ICD-10-PCS | Mod: ,,, | Performed by: INTERNAL MEDICINE

## 2019-03-06 PROCEDURE — 25000242 PHARM REV CODE 250 ALT 637 W/ HCPCS: Performed by: STUDENT IN AN ORGANIZED HEALTH CARE EDUCATION/TRAINING PROGRAM

## 2019-03-06 PROCEDURE — 25000003 PHARM REV CODE 250: Performed by: STUDENT IN AN ORGANIZED HEALTH CARE EDUCATION/TRAINING PROGRAM

## 2019-03-06 PROCEDURE — 36415 COLL VENOUS BLD VENIPUNCTURE: CPT

## 2019-03-06 PROCEDURE — 99900035 HC TECH TIME PER 15 MIN (STAT)

## 2019-03-06 PROCEDURE — 25000003 PHARM REV CODE 250: Performed by: HOSPITALIST

## 2019-03-06 PROCEDURE — 94761 N-INVAS EAR/PLS OXIMETRY MLT: CPT

## 2019-03-06 PROCEDURE — 85025 COMPLETE CBC W/AUTO DIFF WBC: CPT

## 2019-03-06 PROCEDURE — 63600175 PHARM REV CODE 636 W HCPCS: Mod: JG | Performed by: INTERNAL MEDICINE

## 2019-03-06 PROCEDURE — 83735 ASSAY OF MAGNESIUM: CPT

## 2019-03-06 PROCEDURE — 99232 SBSQ HOSP IP/OBS MODERATE 35: CPT | Mod: ,,, | Performed by: INTERNAL MEDICINE

## 2019-03-06 PROCEDURE — 97535 SELF CARE MNGMENT TRAINING: CPT

## 2019-03-06 PROCEDURE — 94660 CPAP INITIATION&MGMT: CPT

## 2019-03-06 PROCEDURE — 94640 AIRWAY INHALATION TREATMENT: CPT

## 2019-03-06 PROCEDURE — 97530 THERAPEUTIC ACTIVITIES: CPT

## 2019-03-06 PROCEDURE — 97116 GAIT TRAINING THERAPY: CPT

## 2019-03-06 PROCEDURE — 63600175 PHARM REV CODE 636 W HCPCS: Mod: JG | Performed by: PHYSICIAN ASSISTANT

## 2019-03-06 PROCEDURE — 97110 THERAPEUTIC EXERCISES: CPT

## 2019-03-06 PROCEDURE — 11000001 HC ACUTE MED/SURG PRIVATE ROOM

## 2019-03-06 RX ORDER — POLYETHYLENE GLYCOL 3350, SODIUM SULFATE ANHYDROUS, SODIUM BICARBONATE, SODIUM CHLORIDE, POTASSIUM CHLORIDE 236; 22.74; 6.74; 5.86; 2.97 G/4L; G/4L; G/4L; G/4L; G/4L
2000 POWDER, FOR SOLUTION ORAL ONCE
Status: COMPLETED | OUTPATIENT
Start: 2019-03-06 | End: 2019-03-06

## 2019-03-06 RX ADMIN — PANTOPRAZOLE SODIUM 40 MG: 40 TABLET, DELAYED RELEASE ORAL at 09:03

## 2019-03-06 RX ADMIN — TIOTROPIUM BROMIDE 18 MCG: 18 CAPSULE ORAL; RESPIRATORY (INHALATION) at 09:03

## 2019-03-06 RX ADMIN — HYDROCODONE BITARTRATE AND ACETAMINOPHEN 1 TABLET: 10; 325 TABLET ORAL at 03:03

## 2019-03-06 RX ADMIN — POLYETHYLENE GLYCOL 3350, SODIUM SULFATE ANHYDROUS, SODIUM BICARBONATE, SODIUM CHLORIDE, POTASSIUM CHLORIDE 2000 ML: 236; 22.74; 6.74; 5.86; 2.97 POWDER, FOR SOLUTION ORAL at 04:03

## 2019-03-06 RX ADMIN — METOPROLOL SUCCINATE 100 MG: 100 TABLET, EXTENDED RELEASE ORAL at 09:03

## 2019-03-06 RX ADMIN — VERAPAMIL HYDROCHLORIDE 240 MG: 240 TABLET, FILM COATED, EXTENDED RELEASE ORAL at 09:03

## 2019-03-06 RX ADMIN — FUROSEMIDE 40 MG: 40 TABLET ORAL at 09:03

## 2019-03-06 RX ADMIN — LEVOTHYROXINE SODIUM 112 MCG: 112 TABLET ORAL at 06:03

## 2019-03-06 RX ADMIN — ALTEPLASE 2 MG: 2.2 INJECTION, POWDER, LYOPHILIZED, FOR SOLUTION INTRAVENOUS at 08:03

## 2019-03-06 RX ADMIN — LEVALBUTEROL 1.25 MG: 1.25 SOLUTION, CONCENTRATE RESPIRATORY (INHALATION) at 08:03

## 2019-03-06 RX ADMIN — GABAPENTIN 100 MG: 100 CAPSULE ORAL at 09:03

## 2019-03-06 RX ADMIN — ALTEPLASE 2 MG: 2.2 INJECTION, POWDER, LYOPHILIZED, FOR SOLUTION INTRAVENOUS at 11:03

## 2019-03-06 RX ADMIN — ALPRAZOLAM 0.5 MG: 0.5 TABLET ORAL at 10:03

## 2019-03-06 RX ADMIN — FUROSEMIDE 40 MG: 40 TABLET ORAL at 05:03

## 2019-03-06 RX ADMIN — HYDROCODONE BITARTRATE AND ACETAMINOPHEN 1 TABLET: 10; 325 TABLET ORAL at 09:03

## 2019-03-06 RX ADMIN — MONTELUKAST SODIUM 10 MG: 10 TABLET, FILM COATED ORAL at 09:03

## 2019-03-06 RX ADMIN — HYDROCODONE BITARTRATE AND ACETAMINOPHEN 1 TABLET: 10; 325 TABLET ORAL at 05:03

## 2019-03-06 NOTE — TREATMENT PLAN
GI Treatment Plan    Makenzie Leija is a 78 y.o. female admitted to hospital 2/19/2019 (Hospital Day: 16) due to Acute on chronic respiratory failure with hypoxia.     Interval History  - feels better today, notes that her bowel movements appear to be lightening  - denies abdominal pain, n/v/d  - daughter at bedside    Objective  Temp:  [97.5 °F (36.4 °C)-98.2 °F (36.8 °C)] 97.9 °F (36.6 °C) (03/06 1521)  Pulse:  [55-86] 64 (03/06 1500)  BP: (100-122)/(51-65) 122/65 (03/06 1500)  Resp:  [14-22] 18 (03/06 1500)  SpO2:  [91 %-99 %] 99 % (03/06 1500)    General: Alert, Oriented x3, no distress  Abdomen: Normoactive bowel sounds. Non-distended. Normal tympany. Soft. Non-tender. No peritoneal signs.    Laboratory    Recent Labs   Lab 03/04/19  1102 03/05/19  0403 03/06/19  0800   HGB 8.1* 7.5* 7.5*       Lab Results   Component Value Date    WBC 6.98 03/06/2019    HGB 7.5 (L) 03/06/2019    HCT 24.7 (L) 03/06/2019    MCV 95 03/06/2019     03/06/2019       Lab Results   Component Value Date     03/06/2019    K 3.9 03/06/2019    CL 98 03/06/2019    CO2 30 (H) 03/06/2019    BUN 42 (H) 03/06/2019    CREATININE 1.0 03/06/2019    CALCIUM 9.4 03/06/2019    ANIONGAP 9 03/06/2019    ESTGFRAFRICA >60.0 03/06/2019    EGFRNONAA 53.7 (A) 03/06/2019       Lab Results   Component Value Date    ALT 17 03/06/2019    AST 13 03/06/2019    ALKPHOS 77 03/06/2019    BILITOT 0.6 03/06/2019       Lab Results   Component Value Date    INR 0.9 03/03/2019    INR 0.9 03/02/2019    INR 1.0 03/01/2019       Plan  - discussed risks/benefits of evaluation with VCE.  - clear liquids today, prep overnight, NPO at [facility] in AM  - Plan of care was discussed with primary team.  - We will continue to follow.    Thank you for involving us in the care of Makenzie SCHWARTZ Leija. Please call with any additional questions, concerns or changes in the patient's clinical status.    Rafi Petit MD  Gastroenterology Fellow, PGY IV  Spectralink: [phone]

## 2019-03-06 NOTE — PT/OT/SLP PROGRESS
Occupational Therapy   Treatment    Name: Makenzie Leija  MRN: [id number]  Admitting Diagnosis:  Acute on chronic respiratory failure with hypoxia  9 Days Post-Op    Recommendations:     Discharge Recommendations: other (see comments)(return to HHOT)  Discharge Equipment Recommendations:  none  Barriers to discharge:  None    Assessment:     Makenzie Leija is a 78 y.o. female with a medical diagnosis of Acute on chronic respiratory failure with hypoxia.  She presents with impaired ADL and functional mobility performance. Pt pleasant and compliant during OT treatment focused on UE therapeutic exercises, FM dexterity exercises, education on importance of continued mobility with UEs for optimal ADL performance, as well as performance of a sit<>stand transfer with rollator. Performance deficits affecting function are weakness, impaired functional mobilty, gait instability, impaired endurance, impaired self care skills, impaired balance, impaired cardiopulmonary response to activity. Pt would benefit from OT services to further improve daily living skills to increase QOL. Recommended d/c is return to HHOT at this time.    Rehab Prognosis:  Good; patient would benefit from acute skilled OT services to address these deficits and reach maximum level of function.       Plan:     Patient to be seen 3 x/week to address the above listed problems via self-care/home management, therapeutic activities, therapeutic exercises  · Plan of Care Expires: 03/21/19  · Plan of Care Reviewed with: patient, daughter    Subjective     Pain/Comfort:  · Pain Rating 1: 0/10    Objective:     Communicated with: RN prior to session.  Patient found up in chair with telemetry, peripheral IV, PureWick, oxygen upon OT entry to room.    General Precautions: Standard, fall, respiratory   Orthopedic Precautions:N/A   Braces: N/A     Occupational Performance:     Functional Mobility/Transfers:  · Patient completed Sit <> Stand Transfer with stand by  assistance  with  personal rollator     Activities of Daily Living:  · Lower Body Dressing: modified independence utilizing sock aide and reacher for sock don/doffing sitting up in chair. Pt received education, demonstration, and performed practice with AE to increase (I) in dressing. Pt explained she would like these tools to be ordered for her upon d/c.      [facility] 6 Click ADL: 21    Treatment & Education:  Role of OT, POC, and safety during ADLs and functional mobility education as well as education regarding AE usage for LB dressing. Pt voiced she liked using the hip kit to don socks while sitting. Pt completed sit <>stand transfer with SBA using personal walker as well as the following UE exercises utilizing the 1# ada sitting up in the chair:   -shoulder flexion/ext  -elbow flexion/ext  -forearm pronation/supination  -wrist flexion/ext  Pt then expressed she was having trouble with finger dexterity to which patient performed the following:  -Hand towel manipulation with BUEs with focus on each digit one at at time to roll and fold towel x3 trials each hand.  -Finger taps to each digit with BUEs forward and back digits 1-5  -Finger flexion/extension exercises and light fist 1x10   Pt also was educated on how to continue the dexterity exercises at home upon discharge.    Patient left up in chair with all lines intact, call button in reach and daughter  presentEducation:      GOALS:   Multidisciplinary Problems     Occupational Therapy Goals        Problem: Occupational Therapy Goal    Goal Priority Disciplines Outcome Interventions   Occupational Therapy Goal     OT, PT/OT Ongoing (interventions implemented as appropriate)    Description:  Goals to be met by: 3/21/2019    Patient will increase functional independence with ADLs by performing:    Toileting from toilet with Minimal Assistance for hygiene and clothing management. MET 3/1  Stand pivot transfers with Minimal Assistance. Met 2/25  Squat pivot  transfers with Minimal Assistance. Met 2/25  Toilet transfer to bedside commode with Contact Guard Assistance. MET 3/1  Bathing from  standing at sink with Minimal Assistance.  LE dressing Supervision with AD to don B socks, pants, and underpants.   (progressing- met socks part of goal with reacher and sock aide)                    Time Tracking:     OT Date of Treatment: 03/06/19  OT Start Time: 1100  OT Stop Time: 1131  OT Total Time (min): 31 min    Billable Minutes:Self Care/Home Management 15 min  Therapeutic Exercise 16 min    Rema Horner OT  3/6/2019

## 2019-03-06 NOTE — PLAN OF CARE
Problem: Physical Therapy Goal  Goal: Physical Therapy Goal  Goals to be met by: 3/6/2019     Patient will increase functional independence with mobility by performin. Supine to sit with Set-up [place]  2. Sit to supine with Set-up [place]  3. Sit to stand transfer with Contact Guard Assistance - Met       Revised: Sit to stand transfer with Supervision - Not Met  4. Bed to chair transfer with Contact Guard Assistance using Rolling Walker - Met      Revised: Bed to chair transfer with Supervision using Rolling Walker - Not Met  5. Gait  x 75 feet with Contact Guard Assistance using Rolling Walker. - Met      Revised: Gait  x 200 feet with Supervision using Rolling Walker. - Not Met        Discharge Recommendations: HH PT    Pt safe to transfer OOB/BTB and amb with RN/PCT: Use rollator with SBA.    Goals remain appropriate.     Estelle Ramos, PTA.   [phone]   3/6/2019

## 2019-03-06 NOTE — PLAN OF CARE
Problem: Occupational Therapy Goal  Goal: Occupational Therapy Goal  Goals to be met by: 3/21/2019    Patient will increase functional independence with ADLs by performing:    Toileting from toilet with Minimal Assistance for hygiene and clothing management. MET 3/1  Stand pivot transfers with Minimal Assistance. Met 2/25  Squat pivot transfers with Minimal Assistance. Met 2/25  Toilet transfer to bedside commode with Contact Guard Assistance. MET 3/1  Bathing from  standing at sink with Minimal Assistance.  LE dressing Supervision with AD to don B socks, pants, and underpants.   (progressing- met socks part of goal with reacher and sock aide)  Outcome: Ongoing (interventions implemented as appropriate)  Pt progressing well towards OT goals. Continue POC.  Rema Horner OT  3/6/2019

## 2019-03-06 NOTE — PROGRESS NOTES
Hospital Medicine   Progress note   Team: [facility] HOSP MED O Corina Dias MD   Admit Date: 2/19/2019   JERRY    Code status: Full Code   Principal Problem: Acute on chronic respiratory failure with hypoxia     Hospital Course:  Transferred to [facility] for higher level of cardiac care. Admitted to [facility] for acute diastolic heart failure HOCM with pulmonary edema and bilateral pleural effusions. Patient admitted 2/19/19. 2D echo repeated; septal size 2.2cm, ROSAURA with severe outflow tract left ventricular obstruction. The peak gradient is near 100mm Hg. Patient started on BB and CCB. Will need ablation as an outpatient.     2/21/19 patient h/h noted to be 7 down from 8.5 and subcutaneous ecchymosis noted around her lower abdomen. Lovenox stopped and patient was transferred to hospital medicine. EGD on 2/25/19 showed no acute findings, and no source of bleed.  2/26/19, patients Hg dropped to 6.9 and she received her second blood transfusion.  Patient started on heparin ggt.   2/27/19.  Patient had episode of dyspnea overnight with sats remaining >88. On Heparin ggt for bleeding with adequate hg trends. LE US showed no DVTs.  CXR showed increased pulmonary edema and R sided effusion appears increased. US of abdomen showed no ascities. Interventional cardiology came to assess patient in light of HOCM and being +symptomatic.   2/28/19: Patient desatted overnight, requiring 6L of O2 for SpO2 88-90%.  Patient with pitting edema and CXR that showed fluid, given 20Lasix and responded well (-2L). Patient had a nose bleed overnight and coughed a small amount of blood. Heparin ggt was stopped temporarily and restarted AM. Patient started on lasix ggt.  3/1: continued on lasix ggt and started on coumadin (as per patients decision)  3/2: transitioned to lasix IV 40 and tolerated well.    3/3: Hg dropped 6.8, transfused 1u  stopped coumadin and patient decided to go off all blood thinners.   3/4: patient with Chest tightness.   Breathing treatment didn't help.  Cardiac workup pending. Patient also had Hem Occult positive blood this AM.  GI re-consulted for potential colonoscopy    Interval hx:  There is some issues with nursing per patient overnight.  PICC line clotted but resolved with cath flow.  Hemoglobin 7.5 this a.m..  GI planning possible VCE on 3/7.  O2 sats stable on 4 L nasal cannula.        ROS   Constitutional: Negative for chills, fever. +fatigue  HENT: Negative for sore throat, trouble swallowing.  +nosebleeds  Eyes: Negative for photophobia, visual disturbance.   Respiratory: +cough, +shortness of breath.    Cardiovascular: Negative for chest pain, palpitations, leg swelling.   Gastrointestinal: Negative for abdominal pain, constipation, diarrhea, nausea, vomiting. +melena  Endocrine: Negative for cold intolerance, heat intolerance.   Genitourinary: Negative for dysuria, frequency.   Musculoskeletal: Negative for arthralgias, myalgias.   Skin: Negative for rash, erythema   Neurological: Negative for dizziness, syncope, light-headedness. +weakness  Psychiatric/Behavioral: Negative for confusion, hallucinations, +anxiety  All other systems reviewed and are negative.    PEx   Temp:  [97.5 °F (36.4 °C)-98.2 °F (36.8 °C)]   Pulse:  [69-86]   Resp:  [15-22]   BP: (111-140)/(64-74)   SpO2:  [91 %-99 %]      I & O (Last 24H):     Intake/Output Summary (Last 24 hours) at 3/6/2019 0819  Last data filed at 3/5/2019 2100  Gross per 24 hour   Intake 800 ml   Output 3950 ml   Net -3150 ml       General appearance: no distress, alert and oriented x 3, chronically ill-appearing  HEENT:  conjunctivae/corneas clear, PERRL, mucous membranes moist, [place]  Neck: supple, thyroid not enlarged  Pulm:   normal respiratory effort, decreased breath sounds in bases, difficult to assess 2/2 body habitus, no crackles or wheezes, no rhonchi, + O2 via nasal cannula @4L  Card: RRR, S1, S2 normal, no murmur, click, rub or gallop  Abd: soft, NT, ND, BS present;  no masses, no organomegaly  Ext: no c/c/e, +PICC in RUE  Pulses: 2+, symmetric  Skin: color, texture, turgor normal.   Neuro: CN II-XII grossly intact, no focal numbness or weakness, normal strength and tone   Psych: normal mood and affect      Recent Results (from the past 24 hour(s))   POCT glucose    Collection Time: 03/05/19 11:16 AM   Result Value Ref Range    POCT Glucose 117 (H) 70 - 110 mg/dL   POCT glucose    Collection Time: 03/05/19  4:34 PM   Result Value Ref Range    POCT Glucose 132 (H) 70 - 110 mg/dL   POCT glucose    Collection Time: 03/05/19  9:43 PM   Result Value Ref Range    POCT Glucose 200 (H) 70 - 110 mg/dL       Recent Labs   Lab 03/04/19  1616 03/04/19  2103 03/05/19  0742 03/05/19  1116 03/05/19  1634 03/05/19  2143   POCTGLUCOSE 174* 200* 126* 117* 132* 200*       Hemoglobin A1C   Date Value Ref Range Status   02/19/2019 5.6 4.0 - 5.6 % Final     Comment:     ADA Screening Guidelines:  5.7-6.4%  Consistent with prediabetes  >or=6.5%  Consistent with diabetes  High levels of fetal hemoglobin interfere with the HbA1C  assay. Heterozygous hemoglobin variants (HbS, HgC, etc)do  not significantly interfere with this assay.   However, presence of multiple variants may affect accuracy.          Active Hospital Problems    Diagnosis  POA    *Acute on chronic respiratory failure with hypoxia [J96.21]  Yes    Acute blood loss anemia [D62]  Yes    HOCM (hypertrophic obstructive cardiomyopathy) [I42.1]  Yes    Acute on chronic diastolic congestive heart failure, NYHA class 3 [I50.33]  Yes    Pulmonary HTN [I27.20]  Yes    Hypothyroidism [E03.9]  Yes    Type 2 diabetes mellitus [E11.9]  Yes    Chronic pulmonary embolism [I27.82]  Yes    Pleural effusion, bilateral [J90]  Yes    COPD (chronic obstructive pulmonary disease) [J44.9]  Yes      Resolved Hospital Problems   No resolved problems to display.         alteplase  2 mg Intra-Catheter Once    docusate sodium  100 mg Oral Daily     fluticasone  1 spray Each Nare Daily    fluticasone-vilanterol  1 puff Inhalation Daily    furosemide  40 mg Oral BID    gabapentin  100 mg Oral BID    levothyroxine  112 mcg Oral Before breakfast    metoprolol succinate  100 mg Oral Daily    montelukast  10 mg Oral QHS    pantoprazole  40 mg Oral Daily    tiotropium  1 capsule Inhalation Daily    verapamil  240 mg Oral Nightly     sodium chloride, acetaminophen, ALPRAZolam, dextrose 50%, dextrose 50%, glucagon (human recombinant), glucose, glucose, HYDROcodone-acetaminophen, insulin aspart U-100, levalbuterol, sodium chloride 0.9%      Assessment and Plan for problems addressed today:   1. Acute on chronic respiratory failure  - Patient acute respiratory failure is multifactorial secondary to: moderate COPD, pulmonary HTN (PA 49), HOCM, PE, MARISOL (CPAP at 4), bilateral pulmonary effusion, hx of RUL cancer s/p chemo&XRT, recently tx for CAP zosyn x 12 days   - treated for PNA at OSH, no signs of infection. No further txt required   - per OSH records patient not tolerating CPAP at night, would get hypoxic and was switched over to BiPAP  - will continue BiPAP qhs  - pulmonary consulted; appreciate assistance - signed off   - wean O2 as able  - IS     2. 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   - has been evaluated by hem/onc as outpatient and all workup was normal   - s/p 1 unit PRBCs  - CBC q12  - hemoglobin 7.5 this a.m.  - EGD nonacute  Impression:           - Normal esophagus.                        - Normal stomach.                        - Normal examined duodenum.                        - No specimens collected.  Recommendation:       - Return patient to hospital [facility] for ongoing care.                        - Use a proton pump inhibitor PO daily                         prophylactically if planning on anticoagulation.                        - The  findings and recommendations were discussed                         with the designated responsible adult.  - GI following  - recommend outpatient colonoscopy  - possible VCE tomorrow    3. Hypothyroidism  - continue home dose synthroid      4. Pulmonary HTN  - pulmonary artery systolic pressure (PASP)= 49  - Mean PAP can be approximated because mPAP = 0.61sPAP + 2  - Mean PAP= 31.89 (32)     5. HOCM (hypertrophic obstructive cardiomyopathy)  - Hypertrophic cardiomyopathy with asymmetric septal hypertrophy  - Systolic anterior motion of the mitral valve with severe outflow tract left ventricular obstruction. The peak gradient is near 100mm Hg.  - continue BB and CCB  - uptitrate medications as tolerated   - cards following   - will need outpatient interventional cardiology follow-up for ablation      6. MARISOL on CPAP  - BiPAP qhs 12/8     7. Type 2 diabetes mellitus  - Low dose insulin sliding scale   - bedside glucose monitoring      8. Pleural effusion, bilateral  - Thoracentesis 2/8 consistent w/ transudate (OSH studies)  - Thoracentesis 12/2018 cytology negative for malignancy  - Bilateral effusions notes on CXR      9. Chronic pulmonary embolism  - Hx of RML embolus diagnosed 12/2018  - h/h down trending 7.0 (8.5 on admission)  - discontinued lovenox   - discussed with pulmonology and recommended anticoagulation but unfortunately patient did not tolerate and developed bleeding  - holding anticoagulation for now until figure out source of bleed     10. Acute on chronic diastolic congestive heart failure, NYHA class 3    Echo 2/19/2019  · Hypertrophic cardiomyopathy with asymmetric septal hypertrophy  · Normal left ventricular systolic function. The estimated ejection fraction is 65%  · Normal right ventricular systolic function.  · Severe left atrial enlargement.  · Systolic anterior motion of the mitral valve with severe outflow tract left ventricular obstruction. The peak gradient is near 100mm Hg.  · The  estimated PA systolic pressure is 49 mm Hg  · Intermediate central venous pressure (8 mm Hg).     - continue Lasix 40 b.i.d.  - continue BB and CCB     11. COPD (chronic obstructive pulmonary disease)  - continue LAMA/ICS/LABA  - pulm following, appreciate recs   - duonebs q6 hrs     12. Anxiety  - on home xanax, will resume       Diet:  Clears  DVT PPx: CI 2/2 GI bleed  Code status:  Full    Discharge plan and follow up:  Hemoglobin stable at 7.5, respiratory status is stable, GI planning possible VCE on 03/07    Corina Dias MD  [facility] Medicine Staff  [phone] pager

## 2019-03-06 NOTE — PT/OT/SLP PROGRESS
"Physical Therapy Treatment    Patient Name:  Makenzie Leija   MRN:  [id number]  Admitting Diagnosis: Acute on chronic respiratory failure with hypoxia  Recent Surgery: Procedure(s) (LRB):  EGD (ESOPHAGOGASTRODUODENOSCOPY) (N/A) 9 Days Post-Op    Recommendations:     Discharge Recommendations:  ( PT)   Discharge Equipment Recommendations: none   Barriers to discharge: None    Plan:     During this hospitalization, patient to be seen 3 x/week to address the above listed problems via gait training, therapeutic activities, therapeutic exercises, neuromuscular re-education  · Plan of Care Expires:  03/21/19   Plan of Care Reviewed with: patient, daughter    This Plan of care has been discussed with the patient who was involved in its development and understands and is in agreement with the identified goals and treatment plan    Subjective     Communicated with RN (Billie) prior to session.     Patient comments: Pt with c/o SOB during gait  Pain/Comfort:  · Pain Rating 1: 0/10  · Pain Rating Post-Intervention 1: 0/10    Objective:     2 attempts for tx session 2* Pt stating "I'm depressed about not going home today" (10:16 am)  Pt agreeable to PTA returning in pm    Patient found with: oxygen (4LPM via NC), PureWick, pulse ox (continuous), telemetry    Patient found UIC upon PT entry to room, agreeable to treatment.  Daughter present in the room.    General Precautions: Standard, fall, respiratory   Orthopedic Precautions:N/A   Braces: N/A       BED MOBILITY   NP 2* pt found/left seated UIC     TRANSFERS  (vc's for hand placement, sequencing of task and safety)   Patient completed Sit <> Stand Transfer from BS chair with SBA for safety with rollator x2 trial(s)   Patient completed Stand <> Sit Transfer to BS chair with SBA for safety with rollator      GAIT: in hallway with O2 tank in tow   Patient ambulated: 120ft with 2 standing rest breaks   Patient required: SBA for safety   Patient used:  rollator   Gait Pattern " observed: reciprocal gait   Gait Deviation(s): decreased michell, increased time in double stance, decreased velocity of limb motion, decreased step length, decreased stride length and mild instability    Comments: vc's for michell, appropriate breathing and safety    EDUCATION  Patient provided with daily orientation and goals of this PT session. They were educated to call for assistance and to transfer with hospital staff only.  Also, pt was educated on the effects of prolonged immobility and the importance of performing OOB activity to promote healing and reduce recovery time    Whiteboard updated with correct mobility information. RN/PCT notified.  Pt safe to transfer OOB/BTB and amb with RN/PCT: Use rollator with SBA.    Patient left up in chair, with  all lines intact, call button in reach, RN notified and daughter present    AM-PAC 6 CLICK MOBILITY  Turning over in bed (including adjusting bedclothes, sheets and blankets)?: 4  Sitting down on and standing up from a chair with arms (e.g., wheelchair, bedside commode, etc.): 3  Moving from lying on back to sitting on the side of the bed?: 4  Moving to and from a bed to a chair (including a wheelchair)?: 3  Need to walk in hospital room?: 3  Climbing 3-5 steps with a railing?: 2  Basic Mobility Total Score: 19     Assessment:     Makenzie Leija is a 78 y.o. female admitted with a medical diagnosis of Acute on chronic respiratory failure with hypoxia.  She presents with the following impairments/functional limitations:  weakness, impaired endurance, impaired self care skills, impaired functional mobilty, gait instability, impaired balance, impaired cardiopulmonary response to activity. requiring assistance and verbal cues for appropriate breathing, michell and safety to prevent desaturation and SOB during activities. Pt will cont to benefit from skilled PT intervention to address deficits and improve functional mobility.     Rehab Prognosis:  Good; patient would  benefit from acute skilled PT services to address these deficits and reach maximum level of function.      GOALS:   Multidisciplinary Problems     Physical Therapy Goals        Problem: Physical Therapy Goal    Goal Priority Disciplines Outcome Goal Variances Interventions   Physical Therapy Goal     PT, PT/OT Ongoing (interventions implemented as appropriate)     Description:  Goals to be met by: 3/6/2019     Patient will increase functional independence with mobility by performin. Supine to sit with Set-up [place]  2. Sit to supine with Set-up [place]  3. Sit to stand transfer with Contact Guard Assistance - Met       Revised: Sit to stand transfer with Supervision - Not Met  4. Bed to chair transfer with Contact Guard Assistance using Rolling Walker - Met      Revised: Bed to chair transfer with Supervision using Rolling Walker - Not Met  5. Gait  x 75 feet with Contact Guard Assistance using Rolling Walker. - Met      Revised: Gait  x 200 feet with Supervision using Rolling Walker. - Not Met                       Time Tracking:     PT Received On: 19  PT Start Time: 1537     PT Stop Time: 1600  PT Total Time (min): 23 min     Billable Minutes: Gait Training 13 and Therapeutic Activity 10    Treatment Type: Treatment  PT/PTA: PTA     PTA Visit Number: 3       Estelle Ramos PTA.  Pager [phone]    3/6/2019    .

## 2019-03-06 NOTE — PLAN OF CARE
Problem: Adult Inpatient Plan of Care  Goal: Plan of Care Review  Outcome: Ongoing (interventions implemented as appropriate)  Pt resting in bed, denies needs at this time, medicated for pain x 2, BiPap off of pt stating it has a leak, they spoke to RT about the leak, call light in reach, Picc line unable to draw labs and flush, PA called for orders to cathflow, side rails up x 2 for safety, will cont to monitor pt.

## 2019-03-07 ENCOUNTER — TELEPHONE (OUTPATIENT)
Dept: GASTROENTEROLOGY | Facility: CLINIC | Age: 79
End: 2019-03-07

## 2019-03-07 LAB
ALBUMIN SERPL BCP-MCNC: 3.1 G/DL
ALP SERPL-CCNC: 81 U/L
ALT SERPL W/O P-5'-P-CCNC: 17 U/L
ANION GAP SERPL CALC-SCNC: 10 MMOL/L
AST SERPL-CCNC: 16 U/L
BASOPHILS # BLD AUTO: 0.07 K/UL
BASOPHILS NFR BLD: 0.8 %
BILIRUB SERPL-MCNC: 0.7 MG/DL
BUN SERPL-MCNC: 31 MG/DL
CALCIUM SERPL-MCNC: 9.5 MG/DL
CHLORIDE SERPL-SCNC: 96 MMOL/L
CO2 SERPL-SCNC: 31 MMOL/L
CREAT SERPL-MCNC: 0.9 MG/DL
DIFFERENTIAL METHOD: ABNORMAL
EOSINOPHIL # BLD AUTO: 0.3 K/UL
EOSINOPHIL NFR BLD: 2.7 %
ERYTHROCYTE [DISTWIDTH] IN BLOOD BY AUTOMATED COUNT: 15.9 %
EST. GFR  (AFRICAN AMERICAN): >60 ML/MIN/1.73 M^2
EST. GFR  (NON AFRICAN AMERICAN): >60 ML/MIN/1.73 M^2
GLUCOSE SERPL-MCNC: 128 MG/DL
HCT VFR BLD AUTO: 24.7 %
HGB BLD-MCNC: 7.5 G/DL
IMM GRANULOCYTES # BLD AUTO: 0.04 K/UL
IMM GRANULOCYTES NFR BLD AUTO: 0.4 %
LYMPHOCYTES # BLD AUTO: 1.2 K/UL
LYMPHOCYTES NFR BLD: 13 %
MAGNESIUM SERPL-MCNC: 1.7 MG/DL
MCH RBC QN AUTO: 28.2 PG
MCHC RBC AUTO-ENTMCNC: 30.4 G/DL
MCV RBC AUTO: 93 FL
MONOCYTES # BLD AUTO: 0.9 K/UL
MONOCYTES NFR BLD: 9.4 %
NEUTROPHILS # BLD AUTO: 6.9 K/UL
NEUTROPHILS NFR BLD: 73.7 %
NRBC BLD-RTO: 0 /100 WBC
PLATELET # BLD AUTO: 246 K/UL
PMV BLD AUTO: 10.6 FL
POCT GLUCOSE: 119 MG/DL (ref 70–110)
POCT GLUCOSE: 139 MG/DL (ref 70–110)
POCT GLUCOSE: 163 MG/DL (ref 70–110)
POTASSIUM SERPL-SCNC: 3.1 MMOL/L
PROT SERPL-MCNC: 6.2 G/DL
RBC # BLD AUTO: 2.66 M/UL
SODIUM SERPL-SCNC: 137 MMOL/L
WBC # BLD AUTO: 9.32 K/UL

## 2019-03-07 PROCEDURE — 94640 AIRWAY INHALATION TREATMENT: CPT

## 2019-03-07 PROCEDURE — 36415 COLL VENOUS BLD VENIPUNCTURE: CPT

## 2019-03-07 PROCEDURE — 25000003 PHARM REV CODE 250: Performed by: STUDENT IN AN ORGANIZED HEALTH CARE EDUCATION/TRAINING PROGRAM

## 2019-03-07 PROCEDURE — 99900035 HC TECH TIME PER 15 MIN (STAT)

## 2019-03-07 PROCEDURE — 94660 CPAP INITIATION&MGMT: CPT

## 2019-03-07 PROCEDURE — 85025 COMPLETE CBC W/AUTO DIFF WBC: CPT

## 2019-03-07 PROCEDURE — 11000001 HC ACUTE MED/SURG PRIVATE ROOM

## 2019-03-07 PROCEDURE — 25000242 PHARM REV CODE 250 ALT 637 W/ HCPCS: Performed by: STUDENT IN AN ORGANIZED HEALTH CARE EDUCATION/TRAINING PROGRAM

## 2019-03-07 PROCEDURE — 99233 PR SUBSEQUENT HOSPITAL CARE,LEVL III: ICD-10-PCS | Mod: ,,, | Performed by: HOSPITALIST

## 2019-03-07 PROCEDURE — 99233 SBSQ HOSP IP/OBS HIGH 50: CPT | Mod: ,,, | Performed by: HOSPITALIST

## 2019-03-07 PROCEDURE — 91110 PR GI TRACT CAPSULE ENDOSCOPY: ICD-10-PCS | Mod: 26,GC,, | Performed by: INTERNAL MEDICINE

## 2019-03-07 PROCEDURE — 25000003 PHARM REV CODE 250: Performed by: INTERNAL MEDICINE

## 2019-03-07 PROCEDURE — 91110 GI TRC IMG INTRAL ESOPH-ILE: CPT | Mod: 26,GC,, | Performed by: INTERNAL MEDICINE

## 2019-03-07 PROCEDURE — 25000003 PHARM REV CODE 250: Performed by: HOSPITALIST

## 2019-03-07 PROCEDURE — 63600175 PHARM REV CODE 636 W HCPCS: Performed by: HOSPITALIST

## 2019-03-07 PROCEDURE — 83735 ASSAY OF MAGNESIUM: CPT

## 2019-03-07 PROCEDURE — 63600175 PHARM REV CODE 636 W HCPCS: Performed by: PHYSICIAN ASSISTANT

## 2019-03-07 PROCEDURE — 94761 N-INVAS EAR/PLS OXIMETRY MLT: CPT

## 2019-03-07 PROCEDURE — 80053 COMPREHEN METABOLIC PANEL: CPT

## 2019-03-07 RX ORDER — MORPHINE SULFATE 2 MG/ML
1 INJECTION, SOLUTION INTRAMUSCULAR; INTRAVENOUS ONCE AS NEEDED
Status: COMPLETED | OUTPATIENT
Start: 2019-03-07 | End: 2019-03-07

## 2019-03-07 RX ORDER — POTASSIUM CHLORIDE 7.45 MG/ML
10 INJECTION INTRAVENOUS
Status: DISCONTINUED | OUTPATIENT
Start: 2019-03-07 | End: 2019-03-07

## 2019-03-07 RX ORDER — LANOLIN ALCOHOL/MO/W.PET/CERES
400 CREAM (GRAM) TOPICAL ONCE
Status: COMPLETED | OUTPATIENT
Start: 2019-03-07 | End: 2019-03-07

## 2019-03-07 RX ORDER — POTASSIUM CHLORIDE 20 MEQ/1
40 TABLET, EXTENDED RELEASE ORAL ONCE
Status: COMPLETED | OUTPATIENT
Start: 2019-03-07 | End: 2019-03-07

## 2019-03-07 RX ORDER — POTASSIUM CHLORIDE 20 MEQ/1
40 TABLET, EXTENDED RELEASE ORAL EVERY 4 HOURS
Status: DISCONTINUED | OUTPATIENT
Start: 2019-03-07 | End: 2019-03-07

## 2019-03-07 RX ADMIN — TIOTROPIUM BROMIDE 18 MCG: 18 CAPSULE ORAL; RESPIRATORY (INHALATION) at 10:03

## 2019-03-07 RX ADMIN — LEVALBUTEROL 1.25 MG: 1.25 SOLUTION, CONCENTRATE RESPIRATORY (INHALATION) at 10:03

## 2019-03-07 RX ADMIN — HYDROCODONE BITARTRATE AND ACETAMINOPHEN 1 TABLET: 10; 325 TABLET ORAL at 10:03

## 2019-03-07 RX ADMIN — LEVALBUTEROL 1.25 MG: 1.25 SOLUTION, CONCENTRATE RESPIRATORY (INHALATION) at 04:03

## 2019-03-07 RX ADMIN — VERAPAMIL HYDROCHLORIDE 240 MG: 240 TABLET, FILM COATED, EXTENDED RELEASE ORAL at 09:03

## 2019-03-07 RX ADMIN — GABAPENTIN 100 MG: 100 CAPSULE ORAL at 09:03

## 2019-03-07 RX ADMIN — MORPHINE SULFATE: 2 INJECTION, SOLUTION INTRAMUSCULAR; INTRAVENOUS at 06:03

## 2019-03-07 RX ADMIN — FLUTICASONE PROPIONATE 50 MCG: 50 SPRAY, METERED NASAL at 10:03

## 2019-03-07 RX ADMIN — POTASSIUM CHLORIDE 40 MEQ: 1500 TABLET, EXTENDED RELEASE ORAL at 04:03

## 2019-03-07 RX ADMIN — METOPROLOL SUCCINATE 100 MG: 100 TABLET, EXTENDED RELEASE ORAL at 10:03

## 2019-03-07 RX ADMIN — MONTELUKAST SODIUM 10 MG: 10 TABLET, FILM COATED ORAL at 09:03

## 2019-03-07 RX ADMIN — PANTOPRAZOLE SODIUM 40 MG: 40 TABLET, DELAYED RELEASE ORAL at 10:03

## 2019-03-07 RX ADMIN — GABAPENTIN 100 MG: 100 CAPSULE ORAL at 10:03

## 2019-03-07 RX ADMIN — HYDROCODONE BITARTRATE AND ACETAMINOPHEN 1 TABLET: 10; 325 TABLET ORAL at 04:03

## 2019-03-07 RX ADMIN — FUROSEMIDE 40 MG: 40 TABLET ORAL at 06:03

## 2019-03-07 RX ADMIN — FUROSEMIDE 40 MG: 40 TABLET ORAL at 10:03

## 2019-03-07 RX ADMIN — POTASSIUM CHLORIDE 10 MEQ: 10 INJECTION, SOLUTION INTRAVENOUS at 10:03

## 2019-03-07 RX ADMIN — MAGNESIUM OXIDE TAB 400 MG (241.3 MG ELEMENTAL MG) 400 MG: 400 (241.3 MG) TAB at 10:03

## 2019-03-07 RX ADMIN — ALPRAZOLAM 0.5 MG: 0.5 TABLET ORAL at 09:03

## 2019-03-07 NOTE — PROGRESS NOTES
Hospital Medicine   Progress note   Team: [facility] MED O Rell Farley MD   Admit Date: 2/19/2019   JERRY    Code status: Full Code   Principal Problem: Acute on chronic respiratory failure with hypoxia     Hospital Course:  Transferred to [facility] for higher level of cardiac care. Admitted to [facility] for acute diastolic heart failure HOCM with pulmonary edema and bilateral pleural effusions. Patient admitted 2/19/19. 2D echo repeated; septal size 2.2cm, ROSAURA with severe outflow tract left ventricular obstruction. The peak gradient is near 100mm Hg. Patient started on BB and CCB. Will need ablation as an outpatient.     2/21/19 patient h/h noted to be 7 down from 8.5 and subcutaneous ecchymosis noted around her lower abdomen. Lovenox stopped and patient was transferred to hospital medicine. EGD on 2/25/19 showed no acute findings, and no source of bleed.  2/26/19, patients Hg dropped to 6.9 and she received her second blood transfusion.  Patient started on heparin ggt.   2/27/19.  Patient had episode of dyspnea overnight with sats remaining >88. On Heparin ggt for bleeding with adequate hg trends. LE US showed no DVTs.  CXR showed increased pulmonary edema and R sided effusion appears increased. US of abdomen showed no ascities. Interventional cardiology came to assess patient in light of HOCM and being +symptomatic.   2/28/19: Patient desatted overnight, requiring 6L of O2 for SpO2 88-90%.  Patient with pitting edema and CXR that showed fluid, given 20Lasix and responded well (-2L). Patient had a nose bleed overnight and coughed a small amount of blood. Heparin ggt was stopped temporarily and restarted AM. Patient started on lasix ggt.  3/1: continued on lasix ggt and started on coumadin (as per patients decision)  3/2: transitioned to lasix IV 40 and tolerated well.    3/3: Hg dropped 6.8, transfused 1u  stopped coumadin and patient decided to go off all blood thinners.   3/4: patient with Chest  tightness.  Breathing treatment didn't help.  Cardiac workup pending. Patient also had Hem Occult positive blood this AM.  GI re-consulted for potential colonoscopy    Interval hx:  VCE placed today. Hb stable at 7.5 and patient denies any further bloody bowel movements with prep overnight. Discussed with GI and will monitor today pending review of capsule tomorrow. She remains at her baseline shortness of breath. No chest pain. Again discussed risk/benefit of AC and are deferring pending GI w/u.      ROS   Constitutional: Negative for chills, fever. +fatigue  HENT: Negative for sore throat, trouble swallowing.  +nosebleeds  Eyes: Negative for photophobia, visual disturbance.   Respiratory: +cough, +shortness of breath.    Cardiovascular: Negative for chest pain, palpitations, leg swelling.   Gastrointestinal: Negative for abdominal pain, constipation, diarrhea, nausea, vomiting. +melena  Endocrine: Negative for cold intolerance, heat intolerance.   Genitourinary: Negative for dysuria, frequency.   Musculoskeletal: Negative for arthralgias, myalgias.   Skin: Negative for rash, erythema   Neurological: Negative for dizziness, syncope, light-headedness. +weakness  Psychiatric/Behavioral: Negative for confusion, hallucinations, +anxiety  All other systems reviewed and are negative.    PEx   Temp:  [97.8 °F (36.6 °C)-98 °F (36.7 °C)]   Pulse:  [62-78]   Resp:  [16-20]   BP: (100-127)/(51-65)   SpO2:  [94 %-99 %]      I & O (Last 24H):     Intake/Output Summary (Last 24 hours) at 3/7/2019 1122  Last data filed at 3/7/2019 0600  Gross per 24 hour   Intake --   Output 1850 ml   Net -1850 ml       General appearance: no distress, alert and oriented x 3, chronically ill-appearing  HEENT:  conjunctivae/corneas clear, PERRL, mucous membranes moist, [place]  Neck: supple, thyroid not enlarged  Pulm:   normal respiratory effort, decreased breath sounds in bases, difficult to assess 2/2 body habitus, no crackles or wheezes, no  rhonchi, + O2 via nasal cannula @4L  Card: RRR, S1, S2 normal, no murmur, click, rub or gallop  Abd: soft, NT, ND, BS present; no masses, no organomegaly  Ext: no c/c/e, +PICC in RUE  Pulses: 2+, symmetric  Skin: color, texture, turgor normal.   Neuro: CN II-XII grossly intact, no focal numbness or weakness, normal strength and tone   Psych: normal mood and affect      Recent Results (from the past 24 hour(s))   POCT glucose    Collection Time: 03/06/19 11:46 AM   Result Value Ref Range    POCT Glucose 133 (H) 70 - 110 mg/dL   POCT glucose    Collection Time: 03/06/19  5:34 PM   Result Value Ref Range    POCT Glucose 181 (H) 70 - 110 mg/dL   CBC auto differential    Collection Time: 03/06/19  8:25 PM   Result Value Ref Range    WBC 7.81 3.90 - 12.70 K/uL    RBC 2.87 (L) 4.00 - 5.40 M/uL    Hemoglobin 8.2 (L) 12.0 - 16.0 g/dL    Hematocrit 26.4 (L) 37.0 - 48.5 %    MCV 92 82 - 98 fL    MCH 28.6 27.0 - 31.0 pg    MCHC 31.1 (L) 32.0 - 36.0 g/dL    RDW 15.8 (H) 11.5 - 14.5 %    Platelets 258 150 - 350 K/uL    MPV 9.9 9.2 - 12.9 fL    Immature Granulocytes 0.6 (H) 0.0 - 0.5 %    Gran # (ANC) 5.8 1.8 - 7.7 K/uL    Immature Grans (Abs) 0.05 (H) 0.00 - 0.04 K/uL    Lymph # 0.8 (L) 1.0 - 4.8 K/uL    Mono # 0.8 0.3 - 1.0 K/uL    Eos # 0.3 0.0 - 0.5 K/uL    Baso # 0.06 0.00 - 0.20 K/uL    nRBC 0 0 /100 WBC    Gran% 74.1 (H) 38.0 - 73.0 %    Lymph% 10.5 (L) 18.0 - 48.0 %    Mono% 10.4 4.0 - 15.0 %    Eosinophil% 3.6 0.0 - 8.0 %    Basophil% 0.8 0.0 - 1.9 %    Differential Method Automated    CBC auto differential    Collection Time: 03/06/19  8:25 PM   Result Value Ref Range    WBC 7.81 3.90 - 12.70 K/uL    RBC 2.87 (L) 4.00 - 5.40 M/uL    Hemoglobin 8.2 (L) 12.0 - 16.0 g/dL    Hematocrit 26.4 (L) 37.0 - 48.5 %    MCV 92 82 - 98 fL    MCH 28.6 27.0 - 31.0 pg    MCHC 31.1 (L) 32.0 - 36.0 g/dL    RDW 15.8 (H) 11.5 - 14.5 %    Platelets 258 150 - 350 K/uL    MPV 9.9 9.2 - 12.9 fL    Immature Granulocytes 0.6 (H) 0.0 - 0.5 %     Gran # (ANC) 5.8 1.8 - 7.7 K/uL    Immature Grans (Abs) 0.05 (H) 0.00 - 0.04 K/uL    Lymph # 0.8 (L) 1.0 - 4.8 K/uL    Mono # 0.8 0.3 - 1.0 K/uL    Eos # 0.3 0.0 - 0.5 K/uL    Baso # 0.06 0.00 - 0.20 K/uL    nRBC 0 0 /100 WBC    Gran% 74.1 (H) 38.0 - 73.0 %    Lymph% 10.5 (L) 18.0 - 48.0 %    Mono% 10.4 4.0 - 15.0 %    Eosinophil% 3.6 0.0 - 8.0 %    Basophil% 0.8 0.0 - 1.9 %    Differential Method Automated    POCT glucose    Collection Time: 03/06/19  9:57 PM   Result Value Ref Range    POCT Glucose 149 (H) 70 - 110 mg/dL   Comprehensive metabolic panel    Collection Time: 03/07/19  4:51 AM   Result Value Ref Range    Sodium 137 136 - 145 mmol/L    Potassium 3.1 (L) 3.5 - 5.1 mmol/L    Chloride 96 95 - 110 mmol/L    CO2 31 (H) 23 - 29 mmol/L    Glucose 128 (H) 70 - 110 mg/dL    BUN, Bld 31 (H) 8 - 23 mg/dL    Creatinine 0.9 0.5 - 1.4 mg/dL    Calcium 9.5 8.7 - 10.5 mg/dL    Total Protein 6.2 6.0 - 8.4 g/dL    Albumin 3.1 (L) 3.5 - 5.2 g/dL    Total Bilirubin 0.7 0.1 - 1.0 mg/dL    Alkaline Phosphatase 81 55 - 135 U/L    AST 16 10 - 40 U/L    ALT 17 10 - 44 U/L    Anion Gap 10 8 - 16 mmol/L    eGFR if African American >60.0 >60 mL/min/1.73 m^2    eGFR if non African American >60.0 >60 mL/min/1.73 m^2   Magnesium    Collection Time: 03/07/19  4:51 AM   Result Value Ref Range    Magnesium 1.7 1.6 - 2.6 mg/dL   CBC auto differential    Collection Time: 03/07/19  4:51 AM   Result Value Ref Range    WBC 9.32 3.90 - 12.70 K/uL    RBC 2.66 (L) 4.00 - 5.40 M/uL    Hemoglobin 7.5 (L) 12.0 - 16.0 g/dL    Hematocrit 24.7 (L) 37.0 - 48.5 %    MCV 93 82 - 98 fL    MCH 28.2 27.0 - 31.0 pg    MCHC 30.4 (L) 32.0 - 36.0 g/dL    RDW 15.9 (H) 11.5 - 14.5 %    Platelets 246 150 - 350 K/uL    MPV 10.6 9.2 - 12.9 fL    Immature Granulocytes 0.4 0.0 - 0.5 %    Gran # (ANC) 6.9 1.8 - 7.7 K/uL    Immature Grans (Abs) 0.04 0.00 - 0.04 K/uL    Lymph # 1.2 1.0 - 4.8 K/uL    Mono # 0.9 0.3 - 1.0 K/uL    Eos # 0.3 0.0 - 0.5 K/uL    Baso # 0.07  0.00 - 0.20 K/uL    nRBC 0 0 /100 WBC    Gran% 73.7 (H) 38.0 - 73.0 %    Lymph% 13.0 (L) 18.0 - 48.0 %    Mono% 9.4 4.0 - 15.0 %    Eosinophil% 2.7 0.0 - 8.0 %    Basophil% 0.8 0.0 - 1.9 %    Differential Method Automated    POCT glucose    Collection Time: 03/07/19  8:24 AM   Result Value Ref Range    POCT Glucose 163 (H) 70 - 110 mg/dL       Recent Labs   Lab 03/05/19  1634 03/05/19  2143 03/06/19  1146 03/06/19  1734 03/06/19  2157 03/07/19  0824   POCTGLUCOSE 132* 200* 133* 181* 149* 163*       Hemoglobin A1C   Date Value Ref Range Status   02/19/2019 5.6 4.0 - 5.6 % Final     Comment:     ADA Screening Guidelines:  5.7-6.4%  Consistent with prediabetes  >or=6.5%  Consistent with diabetes  High levels of fetal hemoglobin interfere with the HbA1C  assay. Heterozygous hemoglobin variants (HbS, HgC, etc)do  not significantly interfere with this assay.   However, presence of multiple variants may affect accuracy.          Active Hospital Problems    Diagnosis  POA    *Acute on chronic respiratory failure with hypoxia [J96.21]  Yes    Acute blood loss anemia [D62]  Yes    HOCM (hypertrophic obstructive cardiomyopathy) [I42.1]  Yes    Acute on chronic diastolic congestive heart failure, NYHA class 3 [I50.33]  Yes    Pulmonary HTN [I27.20]  Yes    Hypothyroidism [E03.9]  Yes    Type 2 diabetes mellitus [E11.9]  Yes    Chronic pulmonary embolism [I27.82]  Yes    Pleural effusion, bilateral [J90]  Yes    COPD (chronic obstructive pulmonary disease) [J44.9]  Yes      Resolved Hospital Problems   No resolved problems to display.         docusate sodium  100 mg Oral Daily    fluticasone  1 spray Each Nare Daily    fluticasone-vilanterol  1 puff Inhalation Daily    furosemide  40 mg Oral BID    gabapentin  100 mg Oral BID    levothyroxine  112 mcg Oral Before breakfast    metoprolol succinate  100 mg Oral Daily    montelukast  10 mg Oral QHS    pantoprazole  40 mg Oral Daily    potassium chloride  40 mEq  Oral Once    tiotropium  1 capsule Inhalation Daily    verapamil  240 mg Oral Nightly     sodium chloride, acetaminophen, ALPRAZolam, dextrose 50%, dextrose 50%, glucagon (human recombinant), glucose, glucose, HYDROcodone-acetaminophen, insulin aspart U-100, levalbuterol, sodium chloride 0.9%      Assessment and Plan for problems addressed today:   1. Acute on chronic respiratory failure with hypoxia  - Patient acute respiratory failure is multifactorial secondary to: moderate COPD, pulmonary HTN (PA 49), HOCM, PE, MARISOL (CPAP at 4), bilateral pulmonary effusion, hx of RUL cancer s/p chemo&XRT, recently tx for CAP zosyn x 12 days   - treated for PNA at OSH, no signs of infection. No further txt required   - per OSH records patient not tolerating CPAP at night, would get hypoxic and was switched over to BiPAP  - will continue BiPAP qhs  - pulmonary consulted; appreciate assistance - signed off   - wean O2 as able  - IS     2. 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   - has been evaluated by hem/onc as outpatient and all workup was normal   - s/p 1 unit PRBCs  - CBC q12  - hemoglobin 7.5 this a.m.  - EGD nonacute  Impression:   - Normal esophagus.                        - Normal stomach.                        - Normal examined duodenum.                        - No specimens collected.  Recommendation:       - Return patient to [facility] for ongoing care.                        - Use a proton pump inhibitor PO daily                         prophylactically if planning on anticoagulation.                        - The findings and recommendations were discussed                         with the designated responsible adult.  - GI following: VCE today, will review tomorrow and possibly discharge then if Hb remains stable    3. Hypothyroidism  - continue home dose synthroid      4. Pulmonary HTN  - pulmonary artery  systolic pressure (PASP)= 49  - Mean PAP can be approximated because mPAP = 0.61sPAP + 2  - Mean PAP= 31.89 (32)     5. HOCM (hypertrophic obstructive cardiomyopathy)  - Hypertrophic cardiomyopathy with asymmetric septal hypertrophy  - Systolic anterior motion of the mitral valve with severe outflow tract left ventricular obstruction. The peak gradient is near 100mm Hg.  - continue BB and CCB  - uptitrate medications as tolerated   - cards following   - will need outpatient interventional cardiology follow-up for ablation      6. MARISOL on CPAP  - BiPAP qhs 12/8     7. Type 2 diabetes mellitus  - Low dose insulin sliding scale   - bedside glucose monitoring      8. Pleural effusion, bilateral  - Thoracentesis 2/8 consistent w/ transudate (OSH studies)  - Thoracentesis 12/2018 cytology negative for malignancy  - Bilateral effusions notes on CXR      9. Chronic pulmonary embolism  - Hx of RML embolus diagnosed 12/2018  - h/h down trending 7.5 (8.5 on admission)  - discontinued lovenox   - discussed with pulmonology and recommended anticoagulation but unfortunately patient did not tolerate and developed bleeding  - holding anticoagulation for now until figure out source of bleed; should resume again once bleeding controlled     10. Acute on chronic diastolic congestive heart failure, NYHA class 3    Echo 2/19/2019  · Hypertrophic cardiomyopathy with asymmetric septal hypertrophy  · Normal left ventricular systolic function. The estimated ejection fraction is 65%  · Normal right ventricular systolic function.  · Severe left atrial enlargement.  · Systolic anterior motion of the mitral valve with severe outflow tract left ventricular obstruction. The peak gradient is near 100mm Hg.  · The estimated PA systolic pressure is 49 mm Hg  · Intermediate central venous pressure (8 mm Hg).     - continue Lasix 40 b.i.d.  - continue BB and CCB     11. COPD (chronic obstructive pulmonary disease)  - continue LAMA/ICS/LABA  - pulm  following, appreciate apoorva torres q6 hrs     12. Anxiety  - on home xanax, will continue       Diet:  Clears  DVT PPx: CI 2/2 GI bleed  Code status:  Full    Discharge plan and follow up:  Hemoglobin stable at 7.5, respiratory status is stable; VCE today, will f/u tomorrow with possible discharge w/ HH    Rell Farley MD  [facility]  Pager: [phone]  Spectra: [phone]

## 2019-03-07 NOTE — CONSULTS
Patient seen for consult for breakdown to the buttocks. Patient is mobile, sitting up in the chair and able to stand for assessment. There is a superficial open area to the perirectal area that is pink (0.5x0.2x0.1). The patient does report that the area hurts but this is common for MASD. Barrier cream applied and foam dressing removed as the patient reports this is the less painful option. This does not appear to be pressure related but rather moisture. Discussed above with patient. Nursing to continue care.     Erica HEREDIA, BSN, RN, COCN, [facility]  [phone]

## 2019-03-08 VITALS
WEIGHT: 197.31 LBS | DIASTOLIC BLOOD PRESSURE: 66 MMHG | HEART RATE: 71 BPM | SYSTOLIC BLOOD PRESSURE: 146 MMHG | TEMPERATURE: 99 F | RESPIRATION RATE: 19 BRPM | BODY MASS INDEX: 32.87 KG/M2 | HEIGHT: 65 IN | OXYGEN SATURATION: 98 %

## 2019-03-08 LAB
ALBUMIN SERPL BCP-MCNC: 3 G/DL
ALLENS TEST: ABNORMAL
ALP SERPL-CCNC: 83 U/L
ALT SERPL W/O P-5'-P-CCNC: 15 U/L
ANION GAP SERPL CALC-SCNC: 8 MMOL/L
AST SERPL-CCNC: 15 U/L
BASOPHILS # BLD AUTO: 0.05 K/UL
BASOPHILS # BLD AUTO: 0.05 K/UL
BASOPHILS NFR BLD: 0.7 %
BASOPHILS NFR BLD: 0.7 %
BILIRUB SERPL-MCNC: 0.5 MG/DL
BUN SERPL-MCNC: 19 MG/DL
CALCIUM SERPL-MCNC: 9.3 MG/DL
CHLORIDE SERPL-SCNC: 100 MMOL/L
CO2 SERPL-SCNC: 31 MMOL/L
CREAT SERPL-MCNC: 0.9 MG/DL
DELSYS: ABNORMAL
DIFFERENTIAL METHOD: ABNORMAL
DIFFERENTIAL METHOD: ABNORMAL
EOSINOPHIL # BLD AUTO: 0.3 K/UL
EOSINOPHIL # BLD AUTO: 0.3 K/UL
EOSINOPHIL NFR BLD: 3.9 %
EOSINOPHIL NFR BLD: 3.9 %
ERYTHROCYTE [DISTWIDTH] IN BLOOD BY AUTOMATED COUNT: 15.9 %
ERYTHROCYTE [DISTWIDTH] IN BLOOD BY AUTOMATED COUNT: 15.9 %
EST. GFR  (AFRICAN AMERICAN): >60 ML/MIN/1.73 M^2
EST. GFR  (NON AFRICAN AMERICAN): >60 ML/MIN/1.73 M^2
FIO2: 21
GLUCOSE SERPL-MCNC: 102 MG/DL
HCO3 UR-SCNC: 28.6 MMOL/L (ref 24–28)
HCT VFR BLD AUTO: 24.9 %
HCT VFR BLD AUTO: 24.9 %
HGB BLD-MCNC: 7.7 G/DL
HGB BLD-MCNC: 7.7 G/DL
IMM GRANULOCYTES # BLD AUTO: 0.02 K/UL
IMM GRANULOCYTES # BLD AUTO: 0.02 K/UL
IMM GRANULOCYTES NFR BLD AUTO: 0.3 %
IMM GRANULOCYTES NFR BLD AUTO: 0.3 %
LYMPHOCYTES # BLD AUTO: 0.7 K/UL
LYMPHOCYTES # BLD AUTO: 0.7 K/UL
LYMPHOCYTES NFR BLD: 9.4 %
LYMPHOCYTES NFR BLD: 9.4 %
MAGNESIUM SERPL-MCNC: 1.9 MG/DL
MCH RBC QN AUTO: 28.9 PG
MCH RBC QN AUTO: 28.9 PG
MCHC RBC AUTO-ENTMCNC: 30.9 G/DL
MCHC RBC AUTO-ENTMCNC: 30.9 G/DL
MCV RBC AUTO: 94 FL
MCV RBC AUTO: 94 FL
MODE: ABNORMAL
MONOCYTES # BLD AUTO: 0.7 K/UL
MONOCYTES # BLD AUTO: 0.7 K/UL
MONOCYTES NFR BLD: 9 %
MONOCYTES NFR BLD: 9 %
NEUTROPHILS # BLD AUTO: 5.6 K/UL
NEUTROPHILS # BLD AUTO: 5.6 K/UL
NEUTROPHILS NFR BLD: 76.7 %
NEUTROPHILS NFR BLD: 76.7 %
NRBC BLD-RTO: 0 /100 WBC
NRBC BLD-RTO: 0 /100 WBC
PCO2 BLDA: 40.3 MMHG (ref 35–45)
PH SMN: 7.46 [PH] (ref 7.35–7.45)
PLATELET # BLD AUTO: 225 K/UL
PLATELET # BLD AUTO: 225 K/UL
PMV BLD AUTO: 9.9 FL
PMV BLD AUTO: 9.9 FL
PO2 BLDA: 55 MMHG (ref 80–100)
POC BE: 5 MMOL/L
POC SATURATED O2: 90 % (ref 95–100)
POC TCO2: 30 MMOL/L (ref 23–27)
POCT GLUCOSE: 152 MG/DL (ref 70–110)
POTASSIUM SERPL-SCNC: 3.6 MMOL/L
PROT SERPL-MCNC: 6.1 G/DL
RBC # BLD AUTO: 2.66 M/UL
RBC # BLD AUTO: 2.66 M/UL
SAMPLE: ABNORMAL
SITE: ABNORMAL
SODIUM SERPL-SCNC: 139 MMOL/L
WBC # BLD AUTO: 7.25 K/UL
WBC # BLD AUTO: 7.25 K/UL

## 2019-03-08 PROCEDURE — 97116 GAIT TRAINING THERAPY: CPT

## 2019-03-08 PROCEDURE — 25000003 PHARM REV CODE 250: Performed by: HOSPITALIST

## 2019-03-08 PROCEDURE — 25000003 PHARM REV CODE 250: Performed by: STUDENT IN AN ORGANIZED HEALTH CARE EDUCATION/TRAINING PROGRAM

## 2019-03-08 PROCEDURE — 36415 COLL VENOUS BLD VENIPUNCTURE: CPT

## 2019-03-08 PROCEDURE — 93010 ELECTROCARDIOGRAM REPORT: CPT | Mod: ,,, | Performed by: INTERNAL MEDICINE

## 2019-03-08 PROCEDURE — 97110 THERAPEUTIC EXERCISES: CPT

## 2019-03-08 PROCEDURE — 94660 CPAP INITIATION&MGMT: CPT

## 2019-03-08 PROCEDURE — 99900035 HC TECH TIME PER 15 MIN (STAT)

## 2019-03-08 PROCEDURE — 93010 EKG 12-LEAD: ICD-10-PCS | Mod: ,,, | Performed by: INTERNAL MEDICINE

## 2019-03-08 PROCEDURE — 83735 ASSAY OF MAGNESIUM: CPT

## 2019-03-08 PROCEDURE — 25000242 PHARM REV CODE 250 ALT 637 W/ HCPCS: Performed by: STUDENT IN AN ORGANIZED HEALTH CARE EDUCATION/TRAINING PROGRAM

## 2019-03-08 PROCEDURE — 82803 BLOOD GASES ANY COMBINATION: CPT

## 2019-03-08 PROCEDURE — 25000003 PHARM REV CODE 250: Performed by: INTERNAL MEDICINE

## 2019-03-08 PROCEDURE — 80053 COMPREHEN METABOLIC PANEL: CPT

## 2019-03-08 PROCEDURE — 36600 WITHDRAWAL OF ARTERIAL BLOOD: CPT

## 2019-03-08 PROCEDURE — 85025 COMPLETE CBC W/AUTO DIFF WBC: CPT

## 2019-03-08 PROCEDURE — 99239 PR HOSPITAL DISCHARGE DAY,>30 MIN: ICD-10-PCS | Mod: ,,, | Performed by: HOSPITALIST

## 2019-03-08 PROCEDURE — 93005 ELECTROCARDIOGRAM TRACING: CPT

## 2019-03-08 PROCEDURE — 94761 N-INVAS EAR/PLS OXIMETRY MLT: CPT

## 2019-03-08 PROCEDURE — 27000221 HC OXYGEN, UP TO 24 HOURS

## 2019-03-08 PROCEDURE — 99239 HOSP IP/OBS DSCHRG MGMT >30: CPT | Mod: ,,, | Performed by: HOSPITALIST

## 2019-03-08 RX ORDER — VERAPAMIL HYDROCHLORIDE 240 MG/1
240 TABLET, FILM COATED, EXTENDED RELEASE ORAL NIGHTLY
Qty: 30 TABLET | Refills: 11 | Status: SHIPPED | OUTPATIENT
Start: 2019-03-08 | End: 2020-03-07

## 2019-03-08 RX ORDER — LEVALBUTEROL 1.25 MG/.5ML
1.25 SOLUTION, CONCENTRATE RESPIRATORY (INHALATION) EVERY 6 HOURS PRN
Refills: 0
Start: 2019-03-08 | End: 2020-03-07

## 2019-03-08 RX ORDER — HYDROCODONE BITARTRATE AND ACETAMINOPHEN 10; 325 MG/1; MG/1
1 TABLET ORAL EVERY 6 HOURS
Qty: 15 TABLET | Refills: 0 | Status: SHIPPED | OUTPATIENT
Start: 2019-03-08

## 2019-03-08 RX ORDER — METOPROLOL SUCCINATE 100 MG/1
100 TABLET, EXTENDED RELEASE ORAL DAILY
Qty: 30 TABLET | Refills: 11 | Status: SHIPPED | OUTPATIENT
Start: 2019-03-09 | End: 2020-03-08

## 2019-03-08 RX ORDER — GABAPENTIN 100 MG/1
100 CAPSULE ORAL 2 TIMES DAILY
Qty: 60 CAPSULE | Refills: 11 | Status: SHIPPED | OUTPATIENT
Start: 2019-03-08 | End: 2020-03-07

## 2019-03-08 RX ORDER — TIOTROPIUM BROMIDE 18 UG/1
1 CAPSULE ORAL; RESPIRATORY (INHALATION) DAILY
Qty: 30 CAPSULE | Refills: 11 | Status: SHIPPED | OUTPATIENT
Start: 2019-03-09 | End: 2020-03-08

## 2019-03-08 RX ORDER — PANTOPRAZOLE SODIUM 40 MG/1
40 TABLET, DELAYED RELEASE ORAL DAILY
Qty: 30 TABLET | Refills: 11 | Status: SHIPPED | OUTPATIENT
Start: 2019-03-09 | End: 2020-03-08

## 2019-03-08 RX ORDER — ALPRAZOLAM 0.5 MG/1
0.5 TABLET ORAL NIGHTLY PRN
Qty: 5 TABLET | Refills: 0 | Status: SHIPPED | OUTPATIENT
Start: 2019-03-08

## 2019-03-08 RX ORDER — POTASSIUM CHLORIDE 20 MEQ/1
40 TABLET, EXTENDED RELEASE ORAL ONCE
Status: COMPLETED | OUTPATIENT
Start: 2019-03-08 | End: 2019-03-08

## 2019-03-08 RX ADMIN — PANTOPRAZOLE SODIUM 40 MG: 40 TABLET, DELAYED RELEASE ORAL at 09:03

## 2019-03-08 RX ADMIN — HYDROCODONE BITARTRATE AND ACETAMINOPHEN 1 TABLET: 10; 325 TABLET ORAL at 12:03

## 2019-03-08 RX ADMIN — DOCUSATE SODIUM 100 MG: 100 CAPSULE, LIQUID FILLED ORAL at 09:03

## 2019-03-08 RX ADMIN — TIOTROPIUM BROMIDE 18 MCG: 18 CAPSULE ORAL; RESPIRATORY (INHALATION) at 09:03

## 2019-03-08 RX ADMIN — LEVOTHYROXINE SODIUM 112 MCG: 112 TABLET ORAL at 05:03

## 2019-03-08 RX ADMIN — GABAPENTIN 100 MG: 100 CAPSULE ORAL at 09:03

## 2019-03-08 RX ADMIN — HYDROCODONE BITARTRATE AND ACETAMINOPHEN 1 TABLET: 10; 325 TABLET ORAL at 09:03

## 2019-03-08 RX ADMIN — FUROSEMIDE 40 MG: 40 TABLET ORAL at 09:03

## 2019-03-08 RX ADMIN — POTASSIUM CHLORIDE 40 MEQ: 1500 TABLET, EXTENDED RELEASE ORAL at 09:03

## 2019-03-08 RX ADMIN — FLUTICASONE PROPIONATE 50 MCG: 50 SPRAY, METERED NASAL at 09:03

## 2019-03-08 NOTE — PLAN OF CARE
OJ informed [facility] ([phone]) that patient is discharging today. OJ faxed home health orders and clinicals to [facility] in [place], [place] at [phone].    Aleyda Neves LMSW  [facility]

## 2019-03-08 NOTE — PLAN OF CARE
Problem: Occupational Therapy Goal  Goal: Occupational Therapy Goal  Goals to be met by: 3/21/2019    Patient will increase functional independence with ADLs by performing:    Toileting from toilet with Minimal Assistance for hygiene and clothing management. MET 3/1  Stand pivot transfers with Minimal Assistance. Met 2/25  Squat pivot transfers with Minimal Assistance. Met 2/25  Toilet transfer to bedside commode with Contact Guard Assistance. MET 3/1  Bathing from  standing at sink with Minimal Assistance.  LE dressing Supervision with AD to don B socks, pants, and underpants.   (progressing- met socks part of goal with reacher and sock aide)   Outcome: Ongoing (interventions implemented as appropriate)  Pt progressing well. Continue OT POC.  Rema Horner OT  3/8/2019

## 2019-03-08 NOTE — TREATMENT PLAN
GI Treatment Plan    Makenzie Leija is a 78 y.o. female admitted to hospital 2/19/2019 (Hospital Day: 17) due to Acute on chronic respiratory failure with hypoxia.     Interval History  - s/p VCE today  - prelim read negative capsule for any evidence/source GI bleed    Laboratory    Recent Labs   Lab 03/06/19  0800 03/06/19  2025 03/07/19  0451   HGB 7.5* 8.2*  8.2* 7.5*       Lab Results   Component Value Date    WBC 9.32 03/07/2019    HGB 7.5 (L) 03/07/2019    HCT 24.7 (L) 03/07/2019    MCV 93 03/07/2019     03/07/2019       Lab Results   Component Value Date     03/07/2019    K 3.1 (L) 03/07/2019    CL 96 03/07/2019    CO2 31 (H) 03/07/2019    BUN 31 (H) 03/07/2019    CREATININE 0.9 03/07/2019    CALCIUM 9.5 03/07/2019    ANIONGAP 10 03/07/2019    ESTGFRAFRICA >60.0 03/07/2019    EGFRNONAA >60.0 03/07/2019       Lab Results   Component Value Date    ALT 17 03/07/2019    AST 16 03/07/2019    ALKPHOS 81 03/07/2019    BILITOT 0.7 03/07/2019       Lab Results   Component Value Date    INR 0.9 03/03/2019    INR 0.9 03/02/2019    INR 1.0 03/01/2019       Plan  - NPO at [place] pending formal review of capsule in AM  - continue to monitor for signs of bleeding  - We will continue to follow.    Thank you for involving us in the care of Makenzie Leija. Please call with any additional questions, concerns or changes in the patient's clinical status.    Rafi Petit MD  Gastroenterology Fellow, PGY IV  Spectralink: [phone]

## 2019-03-08 NOTE — PLAN OF CARE
Problem: Physical Therapy Goal  Goal: Physical Therapy Goal  Goals to be met by: 3/20/2019     Patient will increase functional independence with mobility by performin. Supine to sit with Set-up [place]  2. Sit to supine with Set-up [place]  3. Sit to stand transfer with Contact Guard Assistance - Met       Revised: Sit to stand transfer with Supervision - Not Met  4. Bed to chair transfer with Contact Guard Assistance using Rolling Walker - Met      Revised: Bed to chair transfer with Supervision using Rolling Walker - Not Met  5. Gait  x 75 feet with Contact Guard Assistance using Rolling Walker. - Met      Revised: Gait  x 200 feet with Supervision using Rolling Walker. - Not Met      Outcome: Ongoing (interventions implemented as appropriate)  Goals extended to 3/20; remain appropriate; progressing    Rafi Sommers PT,DPT  3/8/2019

## 2019-03-08 NOTE — PLAN OF CARE
CM f/u'ed up w/ Shruthi from [facility] - pt's Bipap order and clinical info is being sent to "[employer]" p [phone]. CM will f/u w/ Johanne in approx 15-20 mins to determine if pt's Bipap will be able to be processed today.

## 2019-03-08 NOTE — PT/OT/SLP PROGRESS
"Occupational Therapy   Treatment    Name: Makenzie Leija  MRN: [id number]  Admitting Diagnosis:  Acute on chronic respiratory failure with hypoxia  11 Days Post-Op    Recommendations:     Discharge Recommendations: other (see comments)(HHOT)  Discharge Equipment Recommendations:  none  Barriers to discharge:  None    Assessment:     Makenzie Leija is a 78 y.o. female with a medical diagnosis of Acute on chronic respiratory failure with hypoxia.  She presents with impaired ADLs and functional mobility performance. Pt progressing well during therapy session focused on UE exercises as well as dynamic standing balance and postural control. Pt reported she was SOB last night and felt her breathing was "wagner" which was self-monitored throughout OT session without any complication. Performance deficits affecting function are weakness, impaired endurance, impaired self care skills, gait instability, impaired functional mobilty, impaired balance, impaired cardiopulmonary response to activity.  Pt would benefit from continued OT services to increase daily living skills to improve QOL. Recommended d/c is HHOT at this time.    Rehab Prognosis:  Good; patient would benefit from acute skilled OT services to address these deficits and reach maximum level of function.       Plan:     Patient to be seen 3 x/week to address the above listed problems via self-care/home management, therapeutic activities, therapeutic exercises  · Plan of Care Expires: 03/21/19  · Plan of Care Reviewed with: patient    Subjective     Pain/Comfort:  · Pain Rating 1: 0/10    Objective:     Communicated with: RN prior to session.  Patient found up in chair with oxygen, PureWick, telemetry, pulse ox (continuous) upon OT entry to room.    General Precautions: Standard, fall, respiratory   Orthopedic Precautions:N/A   Braces: N/A     Occupational Performance:     Functional Mobility/Transfers:  · Patient completed Sit <> Stand Transfer with stand by " "assistance and contact guard assistance  with  hand-held assist  as needed. Pt completed x2 trials of sit to stand followed by a 3rd trial focused on dynamic standing balance using BUEs and self correcting posture.  ·   [facility] 6 Click ADL: 21    Treatment & Education:  Role of OT, POC, and safety during ADLs and functional mobility performance explained to pt and daughter. Both verbalized understanding. Pt completed x2 sit to stand trials where pt demonstrated stand for ~30 second each. Pt then completed a prolonged stand with 3x10 reaching and visual tracking activity focused on ROM, dynamic endurance, and standing tolerance. Pt first completed R hand reach, L hand reach, followed by alternating reach. Pt sat for remainder of session to "catch her breath" completing the following UE exercises, 1x10 each:  -shoulder flexion/ext  -elbow flex/ext  -wrist flex/ext  -finger flex/ext  -finger abd/add  -finger tip tapping, reversing directions for added challenge with dexterity and cognition.    Patient left up in chair with all lines intact, call button in reach and daughter presentEducation:      GOALS:   Multidisciplinary Problems     Occupational Therapy Goals        Problem: Occupational Therapy Goal    Goal Priority Disciplines Outcome Interventions   Occupational Therapy Goal     OT, PT/OT Ongoing (interventions implemented as appropriate)    Description:  Goals to be met by: 3/21/2019    Patient will increase functional independence with ADLs by performing:    Toileting from toilet with Minimal Assistance for hygiene and clothing management. MET 3/1  Stand pivot transfers with Minimal Assistance. Met 2/25  Squat pivot transfers with Minimal Assistance. Met 2/25  Toilet transfer to bedside commode with Contact Guard Assistance. MET 3/1  Bathing from  standing at sink with Minimal Assistance.  LE dressing Supervision with AD to don B socks, pants, and underpants.   (progressing- met socks part of goal with reacher " and sock aide)                    Time Tracking:     OT Date of Treatment: 03/08/19  OT Start Time: 1041  OT Stop Time: 1057  OT Total Time (min): 16 min    Billable Minutes:Therapeutic Exercise 16 min    Rmea Horner OT  3/8/2019

## 2019-03-08 NOTE — PLAN OF CARE
[facility]    HOME HEALTH ORDERS  FACE TO FACE ENCOUNTER    Patient Name: Makenzie SCHWARTZ Leija  YOB: 1940    PCP: Karthik Roth MD   PCP Address: [address] [place] [address] [place] / [place] [address]  PCP Phone Number: [phone]  PCP Fax: [phone]    Encounter Date: 03/08/2019    Admit to Home Health    Diagnoses:  Active Hospital Problems    Diagnosis  POA    *Acute on chronic respiratory failure with hypoxia [J96.21]  Yes    Acute blood loss anemia [D62]  Yes    HOCM (hypertrophic obstructive cardiomyopathy) [I42.1]  Yes    Acute on chronic diastolic congestive heart failure, NYHA class 3 [I50.33]  Yes    Pulmonary HTN [I27.20]  Yes    Hypothyroidism [E03.9]  Yes    Type 2 diabetes mellitus [E11.9]  Yes    Chronic pulmonary embolism [I27.82]  Yes    Pleural effusion, bilateral [J90]  Yes    COPD (chronic obstructive pulmonary disease) [J44.9]  Yes      Resolved Hospital Problems   No resolved problems to display.       No future appointments.  Follow-up Information     [facility]    Specialty:  Interventional Cardiology  Contact information:  [address]  [facility] [address]  [phone]                   I have seen and examined this patient face to face today. My clinical findings that support the need for the home health skilled services and home bound status are the following:  Weakness/numbness causing balance and gait disturbance due to Weakness/Debility making it taxing to leave home.  Requiring assistive device to leave home due to unsteady gait caused by  Weakness/Debility.    Allergies:  Review of patient's allergies indicates:   Allergen Reactions    Augmentin [amoxicillin-pot clavulanate]     Cosopt [dorzolamide-timolol]     Cymbalta [duloxetine]     Isordil [isosorbide dinitrate]     Procardia [nifedipine]        Diet: cardiac diet    Activities: activity as tolerated    Nursing:   SN to complete comprehensive  assessment including routine vital signs. Instruct on disease process and s/s of complications to report to MD. Review/verify medication list sent home with the patient at time of discharge  and instruct patient/caregiver as needed. Frequency may be adjusted depending on start of care date.    Notify MD if SBP > 160 or < 90; DBP > 90 or < 50; HR > 120 or < 50; Temp > 101; Other:         CONSULTS:    Physical Therapy to evaluate and treat. Evaluate for home safety and equipment needs; Establish/upgrade home exercise program. Perform / instruct on therapeutic exercises, gait training, transfer training, and Range of Motion.  Occupational Therapy to evaluate and treat. Evaluate home environment for safety and equipment needs. Perform/Instruct on transfers, ADL training, ROM, and therapeutic exercises.   to evaluate for community resources/long-range planning.  Aide to provide assistance with personal care, ADLs, and vital signs.    MISCELLANEOUS CARE:  Routine Skin for Bedridden Patients: Instruct patient/caregiver to apply moisture barrier cream to all skin folds and wet areas in perineal area daily and after baths and all bowel movements.  Heart Failure:      SN to instruct on the following:    Instruct on the definition of CHF.   Instruct on the signs/sympoms of CHF to be reported.   Instruct on and monitor daily weights.   Instruct on factors that cause exacerbation.   Instruct on action, dose, schedule, and side effects of medications.   Instruct on diet as prescribed.   Instruct on activity allowed.   Instruct on life-style modifications for life long management of CHF   SN to assess compliance with daily weights, diet, medications, fluid retention,    safety precautions, activities permitted and life-style modifications.   Additional 1-2 SN visits per week as needed for signs and symptoms     of CHF exacerbation.      For Weight Gain > 2-3 lbs in 1 day or 4-6 lbs over 1 week notify PCP:  Increase  lasix (oral diuretic) dose to 80mg for 5 days temporarily  Obtain BMP lab test in 3 days  If weight does not decrease by 3 lbs after 5 days of increased diuretic usage: Notify PCP.  Home Oxygen:  Oxygen at 3 L/min nasal canula to be used:  Continuously. and Assess oxygen saturation via pulse oximeter as needed for increase in SOB.  COPD: Teach patient MDI/HFA usage and guidelines  Please provide smoking education, cessation, and nicotine replacement options if patient is a current smoker or if anyone in the household is a smoker  Please ensure that patient has a pulse oximeter and is educated on his normal oxygen saturations: 88-92%  Please ensure patient has a functioning nebulizer and provide education on its usage.  If patient has increased cough or symptoms, please initiate COPD protocol including  schedule appointment with PCP or pulmonologist within 24 hours  BiPAP 12/8 nightly    WOUND CARE ORDERS  n/a      Medications: Review discharge medications with patient and family and provide education.      Current Discharge Medication List      START taking these medications    Details   apixaban (ELIQUIS) 5 mg Tab Take 1 tablet (5 mg total) by mouth 2 (two) times daily.      levalbuterol (XOPENEX) 1.25 mg/0.5 mL nebulizer solution Take 0.5 mLs (1.25 mg total) by nebulization every 6 (six) hours as needed for Wheezing. Rescue  Refills: 0      metoprolol succinate (TOPROL-XL) 100 MG 24 hr tablet Take 1 tablet (100 mg total) by mouth once daily.  Qty: 30 tablet, Refills: 11      pantoprazole (PROTONIX) 40 MG tablet Take 1 tablet (40 mg total) by mouth once daily.  Qty: 30 tablet, Refills: 11      tiotropium (SPIRIVA) 18 mcg inhalation capsule Inhale 1 capsule (18 mcg total) into the lungs once daily. Controller  Qty: 30 capsule, Refills: 11      verapamil (CALAN-SR) 240 MG CR tablet Take 1 tablet (240 mg total) by mouth nightly.  Qty: 30 tablet, Refills: 11         CONTINUE these medications which have CHANGED     Details   ALPRAZolam (XANAX) 0.5 MG tablet Take 1 tablet (0.5 mg total) by mouth nightly as needed for Insomnia or Anxiety (SOB/anxiety/rest at night).  Qty: 5 tablet, Refills: 0      gabapentin (NEURONTIN) 100 MG capsule Take 1 capsule (100 mg total) by mouth 2 (two) times daily.  Qty: 60 capsule, Refills: 11      HYDROcodone-acetaminophen (NORCO)  mg per tablet Take 1 tablet by mouth every 6 (six) hours.  Qty: 15 tablet, Refills: 0         CONTINUE these medications which have NOT CHANGED    Details   furosemide (LASIX) 40 MG tablet Take 40 mg by mouth 2 (two) times daily.      iron-vitamin C 100-250 mg, ICAR-C, 100-250 mg Tab Take by mouth.      levothyroxine (SYNTHROID) 112 MCG tablet Take 112 mcg by mouth once daily.      lidocaine (LIDODERM) 5 %(700 mg/patch) Place 1 patch onto the skin every 24 hours. Remove & Discard patch within 12 hours or as directed by MD      metformin (GLUCOPHAGE) 1000 MG tablet Take 1,000 mg by mouth 2 (two) times daily with meals.      montelukast (SINGULAIR) 10 mg tablet Take 10 mg by mouth every evening.      potassium chloride (KLOR-CON) 20 mEq Pack Take 20 mEq by mouth 2 (two) times daily.      senna-docusate 8.6-50 mg (PERICOLACE) 8.6-50 mg per tablet Take 1 tablet by mouth once daily.      fluticasone (FLONASE) 50 mcg/actuation nasal spray 1 spray by Each Nare route once daily.         STOP taking these medications       albuterol 90 mcg/actuation inhaler Comments:   Reason for Stopping:         albuterol-ipratropium 2.5mg-0.5mg/3mL (DUO-NEB) 0.5 mg-3 mg(2.5 mg base)/3 mL nebulizer solution Comments:   Reason for Stopping:         azilsartan medoxomil 80 mg Tab Comments:   Reason for Stopping:         metoprolol tartrate (LOPRESSOR) 50 MG tablet Comments:   Reason for Stopping:         chlorthalidone (HYGROTEN) 25 MG Tab Comments:   Reason for Stopping:         diltiazem (CARDIZEM) 30 MG tablet Comments:   Reason for Stopping:         doxycycline (VIBRA-TABS) 100 MG  tablet Comments:   Reason for Stopping:         venlafaxine (EFFEXOR-XR) 37.5 MG 24 hr capsule Comments:   Reason for Stopping:               I certify that this patient is confined to her home and needs intermittent skilled nursing care, physical therapy and occupational therapy.

## 2019-03-08 NOTE — PROGRESS NOTES
Pt bradycardic , HR 50 manually, /50 manually,asymptomatic, no complaints, Medicine team O notified, EKG and Cardiac orders placed.

## 2019-03-08 NOTE — PROGRESS NOTES
"[facility]  Adult Nutrition  Progress Note    SUMMARY       Recommendations    Recommendation/Intervention:   1. When medically appropriate, resume previous cardiac diet and encourage PO intake.   2.  RD following.    Goals: Intake >/=85% EEN/EPN  Nutrition Goal Status: goal not met  Communication of RD Recs: (POC)    Reason for Assessment    Reason For Assessment: RD follow-up  Diagnosis: (acute on chronic respiratory failure)  Relevant Medical History: Afib, cancer, COPD, CAD, HTN, T2DM, HLD    General Information Comments: Pt NPO/CL x 2 days for VCE yesterday. Remains NPO. Possible discharge today if Hgb stable. Intake had been improving per RN documentation. Appears nourished.    Nutrition Discharge Planning: Adequate PO intake on cardiac diet    Nutrition Risk Screen    Nutrition Risk Screen: no indicators present    Nutrition/Diet History    Patient Reported Diet/Restrictions/Preferences: low salt  Spiritual, Cultural Beliefs, Roman Catholic Practices, Values that Affect Care: no  Factors Affecting Nutritional Intake: NPO    Anthropometrics    Temp: 98.6 °F (37 °C)  Height Method: Stated  Height: 5' 5" (165.1 cm)  Height (inches): 65 in  Weight Method: Bed Scale  Weight: 89.5 kg (197 lb 5 oz)  Weight (lb): 197.31 lb  Ideal Body Weight (IBW), Female: 125 lb  % Ideal Body Weight, Female (lb): 159.79 lb  BMI (Calculated): 33.3  BMI Grade: 30 - 34.9- obesity - grade I       Lab/Procedures/Meds    Pertinent Labs Reviewed: reviewed  Pertinent Labs Comments: Alb 3.0  Pertinent Medications Reviewed: reviewed  Pertinent Medications Comments: docusate, lasix, levothyroxine, pantoprazole, KCl    Estimated/Assessed Needs    Weight Used For Calorie Calculations: 89.5 kg (197 lb 5 oz)  Energy Calorie Requirements (kcal): 1714  Energy Need Method: Gladwin-St Jeor(x 1.25 (PAL))  Protein Requirements:  gm (1.0-1.2 gm/kg)  Weight Used For Protein Calculations: 89.5 kg (197 lb 5 oz)  Fluid Requirements (mL): " per MD     RDA Method (mL): 1714       Nutrition Prescription Ordered    Current Diet Order: Cardiac  Nutrition Order Comments: 1500 ml FR  Oral Nutrition Supplement: Boost Plus    Evaluation of Received Nutrient/Fluid Intake    Comments: LBM 3/7  % Intake of Estimated Energy Needs: 0 - 25 %  % Meal Intake: NPO    Nutrition Risk    Level of Risk/Frequency of Follow-up: low     Assessment and Plan    Nutrition Problem  Inadequate energy intake     Related to (etiology):   Prolonged hospitalization     Signs and Symptoms (as evidenced by):   Meal intake <50%      Recommendations (treatment strategy):  Commercial beverage     Nutrition Diagnosis Status:   Continues    Monitor and Evaluation    Food and Nutrient Intake: energy intake, food and beverage intake  Food and Nutrient Adminstration: diet order  Knowledge/Beliefs/Attitudes: food and nutrition knowledge/skill  Anthropometric Measurements: weight, weight change, body mass index  Biochemical Data, Medical Tests and Procedures: electrolyte and renal panel, gastrointestinal profile, glucose/endocrine profile, inflammatory profile  Nutrition-Focused Physical Findings: overall appearance     Nutrition Follow-Up    RD Follow-up?: Yes

## 2019-03-08 NOTE — TREATMENT PLAN
GI Treatment Plan    Makenzie Leija is a 78 y.o. female admitted to hospital 2/19/2019 (Hospital Day: 18) due to Acute on chronic respiratory failure with hypoxia.     Interval History  - capsule reviewed, no evidence of bleeding    Objective  Temp:  [97.2 °F (36.2 °C)-98.6 °F (37 °C)] 98.6 °F (37 °C) (03/08 1204)  Pulse:  [50-66] 60 (03/08 1204)  BP: (102-121)/(50-71) 113/51 (03/08 1204)  Resp:  [16-20] 20 (03/08 1204)  SpO2:  [96 %-100 %] 100 % (03/08 1204)    Laboratory    Recent Labs   Lab 03/06/19 2025 03/07/19  0451 03/08/19  0958   HGB 8.2*  8.2* 7.5* 7.7*  7.7*       Lab Results   Component Value Date    WBC 7.25 03/08/2019    WBC 7.25 03/08/2019    HGB 7.7 (L) 03/08/2019    HGB 7.7 (L) 03/08/2019    HCT 24.9 (L) 03/08/2019    HCT 24.9 (L) 03/08/2019    MCV 94 03/08/2019    MCV 94 03/08/2019     03/08/2019     03/08/2019       Lab Results   Component Value Date     03/08/2019    K 3.6 03/08/2019     03/08/2019    CO2 31 (H) 03/08/2019    BUN 19 03/08/2019    CREATININE 0.9 03/08/2019    CALCIUM 9.3 03/08/2019    ANIONGAP 8 03/08/2019    ESTGFRAFRICA >60.0 03/08/2019    EGFRNONAA >60.0 03/08/2019       Lab Results   Component Value Date    ALT 15 03/08/2019    AST 15 03/08/2019    ALKPHOS 83 03/08/2019    BILITOT 0.5 03/08/2019       Lab Results   Component Value Date    INR 0.9 03/03/2019    INR 0.9 03/02/2019    INR 1.0 03/01/2019       Plan  - given stable H&H with improving BM and negative VCE do not recommend further therapy/evaluation unless patient has further bleeding  - can resume eliquis  - Plan of care was discussed with primary team.  - We are signing-off. Please call with any questions.    Thank you for involving us in the care of Makenzie SCHWARTZ Leija. Please call with any additional questions, concerns or changes in the patient's clinical status.    Rafi Petit MD  Gastroenterology Fellow, PGY IV  Spectralink: [id number]

## 2019-03-09 NOTE — PLAN OF CARE
Problem: Physical Therapy Goal  Goal: Physical Therapy Goal  Goals to be met by: 3/20/2019     Patient will increase functional independence with mobility by performin. Supine to sit with Set-up Wallace  2. Sit to supine with Set-up Wallace  3. Sit to stand transfer with Contact Guard Assistance - Met       Revised: Sit to stand transfer with Supervision - Not Met  4. Bed to chair transfer with Contact Guard Assistance using Rolling Walker - Met      Revised: Bed to chair transfer with Supervision using Rolling Walker - Not Met  5. Gait  x 75 feet with Contact Guard Assistance using Rolling Walker. - Met      Revised: Gait  x 200 feet with Supervision using Rolling Walker. - Not Met         Discharge Recommendations: HH PT    Pt safe to transfer OOB and amb with RN/PCT: Use rollator with SBA.    Goals remain appropriate.     Estelle Ramos, PTA.   [phone]   3/8/2019

## 2019-03-09 NOTE — DISCHARGE SUMMARY
[facility] Hospital Medicine  Discharge Summary      Patient Name: Makenzie Leija  MRN: [id number]  Admission Date: 2/19/2019  Hospital Length of Stay: 17 days  Discharge Date and Time: 3/8/2019  5:16 PM  Attending Physician: No att. providers found   Discharging Provider: Rell Falrey MD  Primary Care Provider: Karthik Roth MD  Hospital Medicine Team: [facility] Rell Farley MD    HPI:   Ms. 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      Per OSH records:  Pt presented on 2/5/29 via EMS for respiratory distress despite supplemental oxygen. Pt reports increased SOB over the last 4-5 days. On the day of admission significant SOB despite inhale and changing from oxygen compressor to tank. SpO2 75% on 3.5 L at home. Triage noted patient's SpO2 69% on 3L NC. Patient was placed on BiPaP w/ improvement in her respiratory function. CTA chest 2/7/19: without evidence of PE; moderate bilateral pleural effusions; RML consolidation. Thoracentesis 2/9/19: consistent with transudate. (thoracentesis 12/18 negative cytology). Patient was treated w/ antibiotics. Echo December 2018, IHSS, elevated peak gradient across LVOT at 179mmHg and a mean of 94mmHg. FELICIA performed. Patient remained in the hospital till 2/19/19; during which she was diuresed ~15L, started on BB yet remained symptomatic. And decision was made to transfer patient to [facility].      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  Echo was repeated at that time and medications were up titrated as patient tolerated.  Patient will need outpatient septal ablation and medical management of HOCM (metoprolol and verapamil) .  She developed some worsening respiratory distress and pulmonology was consulted.  At that time, she was restarted on Lovenox for her history of PE.  She developed an acute drop in her hemoglobin down to 7 and also had some subcutaneous ecchymoses noted in the abdomen.  Patient also requiring oxygen at 3 L via nasal cannula.  Cardiology requested transfer to Hospital Medicine for further workup of her anemia and hypoxia.  Patient transitioned to heparin drip and continued to have bleeding. GI was consulted and performed EGD 2/26 which was nonacute, and patient was started on xarelto. On 2/27, patient had drop in Hg with reports of dark brown stool so stopped xarelto and discussed anticoagulation risk and benefit (given her unprovoked PE in 12/2018 and her current bleed) she decided to continue with anticoagulation so started on heparin gtt.  She also had worsening SOB, started on lasix gtt and improved (has since transitioned to PO lasix).  On 3/3, she had drop in Hg again, so anticoagulation was stopped. On 3/4, she had a large dark tarry stool hem occult + so consulted GI for further evaluation. VCE performed on 3/7 without any evidence of acute bleed and Hb now stable for several days. OK to resume anticoagulation per GI. Will use apixaban given lower GI bleeding risk.    She will be discharged with close Interventional Cardiology and PCP f/u. Will need to have CBC monitored by  organization and given strict return precautions. Also obtaining BiPAP for  patient as well. Problem based discussion below.    Acute on chronic respiratory failure with hypoxia  - Patient acute respiratory failure is multifactorial secondary to: moderate COPD, pulmonary HTN (PA 49), HOCM, PE, MARISOL (CPAP at 4), bilateral pulmonary effusion, hx of RUL cancer s/p chemo&XRT, recently tx for CAP zosyn x 12 days   - treated for PNA at OSH, no signs of infection. No further txt required   - per OSH records patient not tolerating CPAP at night, would get hypoxic and was switched over to BiPAP  - will continue BiPAP qhs  - pulmonary consulted; will f/u as outpatient  - continue home O2     Acute blood loss anemia  - Patient w/ a history of ITP, bleeding diathesis, daughter with hemophilia B (implying pt is carrier), hx of bleeding.   - has been evaluated by hem/onc as outpatient and all workup was normal   - s/p 1 unit PRBCs  - CBC q12  - hemoglobin 7.7 and stable today  - EGD nonacute  Impression:   - Normal esophagus.                        - Normal stomach.                        - Normal examined duodenum.                        - No specimens collected.  Recommendation:       - Return patient to hospital [facility] for ongoing care.                        - Use a proton pump inhibitor PO daily                         prophylactically if planning on anticoagulation.                        - The findings and recommendations were discussed                         with the designated responsible adult.  - GI following: VCE on 3/7 without evidence of bleeding; ok to resume AC per GI     3. Hypothyroidism  - continue home dose synthroid      Pulmonary HTN  - pulmonary artery systolic pressure (PASP)= 49  - Mean PAP can be approximated because mPAP = 0.61sPAP + 2  - Mean PAP= 31.89 (32)     HOCM (hypertrophic obstructive cardiomyopathy)  - Hypertrophic cardiomyopathy with asymmetric septal hypertrophy  - Systolic anterior motion of the mitral valve with severe outflow tract left ventricular obstruction.  The peak gradient is near 100mm Hg.  - continue BB and CCB  - will need outpatient interventional cardiology follow-up for ablation (referral sent)     MARISOL on CPAP  - BiPAP qhs 12/8; obtaining outpt BiPAP     Type 2 diabetes mellitus  - resume home oral antiglycemics     Pleural effusion, bilateral  - Thoracentesis 2/8 consistent w/ transudate (OSH studies)  - Thoracentesis 12/2018 cytology negative for malignancy  - Bilateral effusions notes on CXR      Chronic pulmonary embolism  - Hx of RML embolus diagnosed 12/2018  - h/h now stable at 7.7  - discontinued lovenox   - discussed with pulmonology and recommended anticoagulation but unfortunately patient did not tolerate and developed bleeding  - will resume eliquis now on discharge given negative VCE (discussed with GI)     Acute on chronic diastolic congestive heart failure, NYHA class 3    Echo 2/19/2019  · Hypertrophic cardiomyopathy with asymmetric septal hypertrophy  · Normal left ventricular systolic function. The estimated ejection fraction is 65%  · Normal right ventricular systolic function.  · Severe left atrial enlargement.  · Systolic anterior motion of the mitral valve with severe outflow tract left ventricular obstruction. The peak gradient is near 100mm Hg.  · The estimated PA systolic pressure is 49 mm Hg  · Intermediate central venous pressure (8 mm Hg).     - continue Lasix 40 PO b.i.d.  - continue BB and CCB     COPD (chronic obstructive pulmonary disease)  - continue LAMA/ICS/LABA  - Pulm referral as outpatient   - duonebs q6 hrs      Anxiety  - on home xanax, will continue ; f/u with outpt Pain Mgmt    Vitals:    03/08/19 1620   BP: (!) 146/66   Pulse: 71   Resp: 19   Temp: 98.6 °F (37 °C)     General appearance: no distress, alert and oriented x 3, chronically ill-appearing  HEENT:  conjunctivae/corneas clear, PERRL, mucous membranes moist, [place]  Neck: supple, thyroid not enlarged  Pulm:   normal respiratory effort, decreased breath sounds in  bases, difficult to assess 2/2 body habitus, no crackles or wheezes, no rhonchi, + O2 via nasal cannula @4L  Card: RRR, S1, S2 normal, no murmur, click, rub or gallop  Abd: soft, NT, ND, BS present; no masses, no organomegaly  Ext: no c/c/e  Pulses: 2+, symmetric  Skin: color, texture, turgor normal.   Neuro: CN II-XII grossly intact, no focal numbness or weakness, normal strength and tone   Psych: normal mood and affect     Consults:   Consults (From admission, onward)        Status Ordering Provider     Inpatient consult to Gastroenterology  Once     Provider:  (Not yet assigned)    Completed COURTNEY ONEAL     Inpatient consult to Pulmonology  Once     Provider:  (Not yet assigned)    Completed RAMESH SOLOMON     Inpatient consult to Social Work  Once     Provider:  (Not yet assigned)    Acknowledged COURTNEY ONEAL     IP consult to case management  Once     Provider:  (Not yet assigned)    Acknowledged AURELIANO FERNANDO     Nutrition Services Referral  Once     Provider:  (Not yet assigned)    Completed JAVIER HOLLOWAY          No new Assessment & Plan notes have been filed under this hospital service since the last note was generated.  Service: Hospital Medicine    Final Active Diagnoses:    Diagnosis Date Noted POA    PRINCIPAL PROBLEM:  Acute on chronic respiratory failure with hypoxia [J96.21] 02/19/2019 Yes    Acute blood loss anemia [D62] 02/21/2019 Yes    HOCM (hypertrophic obstructive cardiomyopathy) [I42.1] 02/19/2019 Yes    Acute on chronic diastolic congestive heart failure, NYHA class 3 [I50.33] 02/19/2019 Yes    Pulmonary HTN [I27.20] 02/19/2019 Yes    Hypothyroidism [E03.9] 02/19/2019 Yes    Type 2 diabetes mellitus [E11.9] 02/19/2019 Yes    Chronic pulmonary embolism [I27.82] 02/19/2019 Yes    Pleural effusion, bilateral [J90] 02/19/2019 Yes    COPD (chronic obstructive pulmonary disease) [J44.9] 02/06/2016 Yes      Problems Resolved During this Admission:       Discharged  "Condition: good    Disposition: [place]-Health Care [facility]    Follow Up:  Follow-up Information     [facility] INTERVENTIONAL CARDIOLOGY.    Specialty:  Interventional Cardiology  Contact information:  Smitha Cerna  [facility] [id number] [phone]               Patient Instructions:      BIPAP FOR HOME USE     Order Specific Question Answer Comments   Type: BiPap    BiPap setting (cmH20) Inspiratory: 12    BiPap setting (cmH20) Expiratory: 5    Length of need (1-99 months): 99    Humidification: Heated    Type of mask: Nasal    BiPap supply refills: 12      CPAP/BIPAP SUPPLIES     Order Specific Question Answer Comments   Type of mask: Nasal    Headgear? Yes    Tubing? Yes    Humidifier chamber? Yes    Chin strap? Yes    Filters? Yes    Cushions? Yes    Length of need (1-99 months): 99      WHEELCHAIR FOR HOME USE     Order Specific Question Answer Comments   Hours in W/C per day: 8    Type of Wheelchair: Lightweight    Patient unable to propel in Standard wheelchair? Yes    Size(Width): 18"(STD adult)    Leg Support: STD footrests    Lap Belt: Velcro    Cushion: Gel    Justification for cushion: Prevent pressure ulcers    Height: 5' 5" (1.651 m)    Weight: 89.5 kg (197 lb 5 oz)    Does patient have medical equipment at home? shower chair    Does patient have medical equipment at home? raised toilet    Does patient have medical equipment at home? bedside commode    Does patient have medical equipment at home? nebulizer    Does patient have medical equipment at home? wheelchair    Does patient have medical equipment at home? oxygen    Does patient have medical equipment at home? rollator    Length of need (1-99 months): 99    Please check all that apply: Caregiver is capable and willing to operate wheelchair safely.      BIPAP FOR HOME USE     Order Specific Question Answer Comments   Type: BiPap    BiPap setting (cmH20) Inspiratory: 12    BiPap setting (cmH20) Expiratory: 8    Length of need (1-99 months): " "99    Humidification: Heated    Type of mask: FFM    Accessories needed: Humidifier chamber    Accessories needed: Tubing    BiPap supply refills: 12      WHEELCHAIR FOR HOME USE     Order Specific Question Answer Comments   Hours in W/C per day: 2    Type of Wheelchair: Lightweight    Patient unable to propel in Standard wheelchair? Yes    Size(Width): 16"(small adult) transport   Leg Support: STD footrests    Lap Belt: Velcro    Cushion: Basic    Height: 5' 5" (1.651 m)    Weight: 89.5 kg (197 lb 5 oz)    Does patient have medical equipment at home? shower chair    Does patient have medical equipment at home? raised toilet    Does patient have medical equipment at home? bedside commode    Does patient have medical equipment at home? nebulizer    Does patient have medical equipment at home? oxygen    Does patient have medical equipment at home? rollator    Length of need (1-99 months): 99    Please check all that apply: Caregiver is capable and willing to operate wheelchair safely.      Ambulatory Referral to Interventional Cardiology   Referral Priority: Routine Referral Type: Consultation   Referral Reason: Specialty Services Required   Requested Specialty: INTERVENTIONAL CARDIOLOGY   Number of Visits Requested: 1     Ambulatory consult to Pulmonology   Referral Priority: Routine Referral Type: Consultation   Referral Reason: Specialty Services Required   Requested Specialty: Pulmonary Disease   Number of Visits Requested: 1     Diet Cardiac     Notify your health care provider if you experience any of the following:  persistent dizziness, light-headedness, or visual disturbances     Notify your health care provider if you experience any of the following:  difficulty breathing or increased cough     Notify your health care provider if you experience any of the following:   Order Comments: Black or bloody BMs     Activity as tolerated       Significant Diagnostic Studies: Labs:   BMP:   Recent Labs   Lab " 03/07/19  0451 03/08/19  0530   * 102    139   K 3.1* 3.6   CL 96 100   CO2 31* 31*   BUN 31* 19   CREATININE 0.9 0.9   CALCIUM 9.5 9.3   MG 1.7 1.9   , CMP   Recent Labs   Lab 03/07/19  0451 03/08/19  0530    139   K 3.1* 3.6   CL 96 100   CO2 31* 31*   * 102   BUN 31* 19   CREATININE 0.9 0.9   CALCIUM 9.5 9.3   PROT 6.2 6.1   ALBUMIN 3.1* 3.0*   BILITOT 0.7 0.5   ALKPHOS 81 83   AST 16 15   ALT 17 15   ANIONGAP 10 8   ESTGFRAFRICA >60.0 >60.0   EGFRNONAA >60.0 >60.0   , CBC   Recent Labs   Lab 03/06/19 2025 03/07/19  0451 03/08/19  0958   WBC 7.81  7.81 9.32 7.25  7.25   HGB 8.2*  8.2* 7.5* 7.7*  7.7*   HCT 26.4*  26.4* 24.7* 24.9*  24.9*     258 246 225  225   , INR   Lab Results   Component Value Date    INR 0.9 03/03/2019    INR 0.9 03/02/2019    INR 1.0 03/01/2019   , Lipid Panel No results found for: CHOL, HDL, LDLCALC, TRIG, CHOLHDL, Troponin   Recent Labs   Lab 03/04/19  1102   TROPONINI 0.022  0.022   , A1C:   Recent Labs   Lab 02/19/19  1529   HGBA1C 5.6    and All labs within the past 24 hours have been reviewed  Microbiology: Blood Culture No results found for: LABBLOO, Sputum Culture No results found for: GSRESP, RESPIRATORYC and Urine Culture  No results found for: LABURIN  Radiology: X-Ray: CXR: X-Ray Chest 1 View (CXR):   Results for orders placed or performed during the hospital encounter of 02/19/19   X-Ray Chest 1 View    Narrative    EXAMINATION:  XR CHEST 1 VIEW    CLINICAL HISTORY:  SOB;    TECHNIQUE:  Single frontal view of the chest was performed.    COMPARISON:  February 25, 2019.    FINDINGS:  Right-sided PICC line appears unchanged.    Cardiomegaly with pulmonary vascular congestion and increasing bilateral edema.  Right-sided effusion also appears increased compared to prior.    Heart and lungs otherwise appear unchanged when allowing for differences in technique and positioning.      Impression    Cardiomegaly with pulmonary vascular congestion  and increasing bilateral edema.  Right-sided effusion also appears increased compared to prior.      Electronically signed by: Casey Platt MD  Date:    02/27/2019  Time:    23:13    and X-Ray Chest PA and Lateral (CXR):   Results for orders placed or performed during the hospital encounter of 02/19/19   X-Ray Chest PA And Lateral    Narrative    EXAMINATION:  XR CHEST PA AND LATERAL    CLINICAL HISTORY:  chest tightness;    TECHNIQUE:  PA and lateral views of the chest were performed.    COMPARISON:  N 02/27/2019 one    FINDINGS:  Right-sided PICC line in the SVC as before.  Mild cardiomegaly.  Right-sided pleural effusion and associated atelectatic changes identified.  The left lung is clear.  No extrapulmonary air.      Impression    See above      Electronically signed by: Alex York MD  Date:    03/04/2019  Time:    10:27     Cardiac Graphics: Echocardiogram:   Transthoracic echo (TTE) complete (Cupid Only):   Results for orders placed or performed during the hospital encounter of 02/19/19   Transthoracic echo (TTE) complete (Cupid Only)   Result Value Ref Range    Ascending aorta 3.08 cm    STJ 2.04 cm    IVRT 0.05 msec    IVS 1.44 (A) 0.6 - 1.1 cm    LA size 4.94 cm    Left Atrium Major Axis 6.10 cm    Left Atrium Minor Axis 6.13 cm    LVIDD 4.12 3.5 - 6.0 cm    LVIDS 2.80 2.1 - 4.0 cm    LVOT diameter 2.06 cm    PW 0.98 0.6 - 1.1 cm    MV Peak A Konstantin 1.35 m/s    E wave decelartion time 219.23 msec    MV Peak E Konstantin 1.60 m/s    PV Peak D Konstantin 0.47 m/s    PV Peak S Konstantin 0.57 m/s    RA Major Axis 5.26 cm    RA Width 4.05 cm    RVDD 4.06 cm    Sinus 2.84 cm    TAPSE 2.37 cm    TR Max Konstantin 3.19 m/s    TDI LATERAL 0.06     TDI SEPTAL 0.08     LA WIDTH 5.35 cm    PV PEAK VELOCITY 1.48 cm/s    LV Diastolic Volume 75.25 mL    LV Systolic Volume 29.45 mL    RV S' 16.60 m/s    LV LATERAL E/E' RATIO 26.67     LV SEPTAL E/E' RATIO 20.00     FS 32 %    LA volume 137.37 cm3    LV mass 175.15 g    Left Ventricle Relative  Wall Thickness 0.48 cm    E/A ratio 1.19     Mean e' 0.07     Pulm vein S/D ratio 1.21     LVOT area 3.33 cm2    E/E' ratio 22.86     LV Systolic Volume Index 14.9 mL/m2    LV Diastolic Volume Index 38.06 mL/m2    LA Volume Index 69.5 mL/m2    LV Mass Index 88.6 g/m2    Triscuspid Valve Regurgitation Peak Gradient 40.70 mmHg    BSA 2.04 m2    Right Atrial Pressure (from IVC) 3 mmHg    TV rest pulmonary artery pressure 44 mmHg       Pending Diagnostic Studies:     Procedure Component Value Units Date/Time    CBC auto differential [[id number]] Collected:  03/08/19 0530    Order Status:  Sent Lab Status:  In process Updated:  03/08/19 0554    Specimen:  Blood     CBC auto differential [[id number]] Collected:  03/06/19 1958    Order Status:  Sent Lab Status:  In process Updated:  03/06/19 1958    Specimen:  Blood     CBC auto differential [[id number]] Collected:  03/05/19 2139    Order Status:  Sent Lab Status:  In process Updated:  03/05/19 2140    Specimen:  Blood     CBC auto differential [[id number]] Collected:  02/21/19 1719    Order Status:  Sent Lab Status:  In process Updated:  02/21/19 1720    Specimen:  Blood     Hematocrit [[id number]] Collected:  02/21/19 1719    Order Status:  Sent Lab Status:  In process Updated:  02/21/19 1720    Specimen:  Blood     Hemoglobin [[id number]] Collected:  02/21/19 1719    Order Status:  Sent Lab Status:  In process Updated:  02/21/19 1720    Specimen:  Blood          Medications:  Reconciled Home Medications:      Medication List      START taking these medications    apixaban 5 mg Tab  Commonly known as:  ELIQUIS  Take 1 tablet (5 mg total) by mouth 2 (two) times daily.     levalbuterol 1.25 mg/0.5 mL nebulizer solution  Commonly known as:  XOPENEX  Take 0.5 mLs (1.25 mg total) by nebulization every 6 (six) hours as needed for Wheezing. Rescue     metoprolol succinate 100 MG 24 hr tablet  Commonly known as:  TOPROL-XL  Take 1 tablet (100 mg total) by mouth once daily.  Start  taking on:  3/9/2019     pantoprazole 40 MG tablet  Commonly known as:  PROTONIX  Take 1 tablet (40 mg total) by mouth once daily.  Start taking on:  3/9/2019     tiotropium 18 mcg inhalation capsule  Commonly known as:  SPIRIVA  Inhale 1 capsule (18 mcg total) into the lungs once daily. Controller  Start taking on:  3/9/2019     verapamil 240 MG CR tablet  Commonly known as:  CALAN-SR  Take 1 tablet (240 mg total) by mouth nightly.        CHANGE how you take these medications    HYDROcodone-acetaminophen  mg per tablet  Commonly known as:  NORCO  Take 1 tablet by mouth every 6 (six) hours.  What changed:  how much to take        CONTINUE taking these medications    ALPRAZolam 0.5 MG tablet  Commonly known as:  XANAX  Take 1 tablet (0.5 mg total) by mouth nightly as needed for Insomnia or Anxiety (SOB/anxiety/rest at night).     fluticasone 50 mcg/actuation nasal spray  Commonly known as:  FLONASE  1 spray by Each Nare route once daily.     furosemide 40 MG tablet  Commonly known as:  LASIX  Take 40 mg by mouth 2 (two) times daily.     gabapentin 100 MG capsule  Commonly known as:  NEURONTIN  Take 1 capsule (100 mg total) by mouth 2 (two) times daily.     iron-vitamin C 100-250 mg (ICAR-C) 100-250 mg Tab  Take by mouth.     levothyroxine 112 MCG tablet  Commonly known as:  SYNTHROID  Take 112 mcg by mouth once daily.     lidocaine 5 %  Commonly known as:  LIDODERM  Place 1 patch onto the skin every 24 hours. Remove & Discard patch within 12 hours or as directed by MD     metFORMIN 1000 MG tablet  Commonly known as:  GLUCOPHAGE  Take 1,000 mg by mouth 2 (two) times daily with meals.     montelukast 10 mg tablet  Commonly known as:  SINGULAIR  Take 10 mg by mouth every evening.     potassium chloride 20 mEq Pack  Commonly known as:  KLOR-CON  Take 20 mEq by mouth 2 (two) times daily.     senna-docusate 8.6-50 mg 8.6-50 mg per tablet  Commonly known as:  PERICOLACE  Take 1 tablet by mouth once daily.        STOP  taking these medications    albuterol 90 mcg/actuation inhaler  Commonly known as:  PROVENTIL/VENTOLIN HFA     albuterol-ipratropium 2.5 mg-0.5 mg/3 mL nebulizer solution  Commonly known as:  DUO-NEB     azilsartan medoxomil 80 mg Tab     chlorthalidone 25 MG Tab  Commonly known as:  HYGROTEN     diltiaZEM 30 MG tablet  Commonly known as:  CARDIZEM     doxycycline 100 MG tablet  Commonly known as:  VIBRA-TABS     metoprolol tartrate 50 MG tablet  Commonly known as:  LOPRESSOR     venlafaxine 37.5 MG 24 hr capsule  Commonly known as:  EFFEXOR-XR            Indwelling Lines/Drains at time of discharge:   Lines/Drains/Airways     Peripherally Inserted Central Catheter Line                 PICC Double Lumen -- days          Drain            Female External Urinary Catheter 02/21/19 0800 15 days                Time spent on the discharge of patient: 50 minutes  Patient was seen and examined on the date of discharge and determined to be suitable for discharge.         Rell Farley MD  Department of Hospital Medicine  [facility]

## 2019-03-09 NOTE — PT/OT/SLP PROGRESS
"Physical Therapy Treatment    Patient Name:  Makenzie SCHWARTZ Leija   MRN:  [id number]  Admitting Diagnosis: Acute on chronic respiratory failure with hypoxia  Recent Surgery: Procedure(s) (LRB):  EGD (ESOPHAGOGASTRODUODENOSCOPY) (N/A) 11 Days Post-Op    Recommendations:     Discharge Recommendations:  ( PT)   Discharge Equipment Recommendations: none   Barriers to discharge: None    Plan:     During this hospitalization, patient to be seen 3 x/week to address the above listed problems via gait training, therapeutic activities, therapeutic exercises, neuromuscular re-education  · Plan of Care Expires:  03/21/19   Plan of Care Reviewed with: patient    This Plan of care has been discussed with the patient who was involved in its development and understands and is in agreement with the identified goals and treatment plan    Subjective     Communicated with RN (Mustapha) prior to session.     Patient comments: "I want to get OOB"  Pain/Comfort:  · Pain Rating 1: 0/10  · Pain Rating Post-Intervention 1: 0/10    Objective:     Patient found with: oxygen, PureWick, pulse ox (continuous), telemetry(Visi)    Patient found sup in bed upon PT entry to room, agreeable to treatment.  Daughter present in the room.    General Precautions: Standard, fall, respiratory   Orthopedic Precautions:N/A   Braces: N/A       BED MOBILITY (vc's for hand placement sequencing of task):        Rolling to the R:  NT.       Rolling to the L:  NT.        Sup > sit at the EOB:  sup for safety.       Sit > sup:  Not performed 2* pt left seated UIC .       Scooting hips to EOB with sup                SITTING AT THE EDGE OF THE BED    Assistance Level Required: mod I      TRANSFERS  (vc's for hand placement, sequencing of task and safety)   Patient completed Sit <> Stand Transfer from EOB with sup for safety with rollator x1 trial(s)   Patient completed Stand <> Sit Transfer to BS chair with sup for safety with rollator        GAIT: O2 tank in tow 2* pt on 3LPM " of O2 via NC   Patient ambulated: 125ft   Patient required: sup for safety   Patient used:  rollator   Gait Pattern observed: reciprocal gait   Gait Deviation(s): decreased michell, decreased velocity of limb motion, decreased weight-shifting ability and mild instability    Comments: vc's for upright posture and appropriate breathing    EDUCATION  Patient provided with daily orientation and goals of this PT session. They were educated to call for assistance and to transfer with hospital staff only.  Also, pt was educated on the effects of prolonged immobility and the importance of performing OOB activity to promote healing and reduce recovery time    Whiteboard updated with correct mobility information. RN/PCT notified.  Pt safe to transfer OOB and amb with RN/PCT: Use rollator with SBA.    Patient left up in chair, with  all lines intact, call button in reach, RN notified and daughter present    AM-PAC 6 CLICK MOBILITY  Turning over in bed (including adjusting bedclothes, sheets and blankets)?: 4  Sitting down on and standing up from a chair with arms (e.g., wheelchair, bedside commode, etc.): 3  Moving from lying on back to sitting on the side of the bed?: 4  Moving to and from a bed to a chair (including a wheelchair)?: 3  Need to walk in hospital room?: 3  Climbing 3-5 steps with a railing?: 2  Basic Mobility Total Score: 19     Assessment:     Makenzie Leija is a 78 y.o. female admitted with a medical diagnosis of Acute on chronic respiratory failure with hypoxia.  She presents with the following impairments/functional limitations:  weakness, impaired endurance, impaired self care skills, impaired functional mobilty, gait instability, impaired balance, decreased lower extremity function, impaired cardiopulmonary response to activity. Pt tolerated tx session well demonstrating no LOB or significant SOB    Rehab Prognosis:  Good; patient would benefit from acute skilled PT services to address these deficits and  reach maximum level of function.      GOALS:   Multidisciplinary Problems     Physical Therapy Goals        Problem: Physical Therapy Goal    Goal Priority Disciplines Outcome Goal Variances Interventions   Physical Therapy Goal     PT, PT/OT Ongoing (interventions implemented as appropriate)     Description:  Goals to be met by: 3/20/2019     Patient will increase functional independence with mobility by performin. Supine to sit with Set-up [place]  2. Sit to supine with Set-up [place]  3. Sit to stand transfer with Contact Guard Assistance - Met       Revised: Sit to stand transfer with Supervision - Not Met  4. Bed to chair transfer with Contact Guard Assistance using Rolling Walker - Met      Revised: Bed to chair transfer with Supervision using Rolling Walker - Not Met  5. Gait  x 75 feet with Contact Guard Assistance using Rolling Walker. - Met      Revised: Gait  x 200 feet with Supervision using Rolling Walker. - Not Met                        Time Tracking:     PT Received On: 19  PT Start Time: 1021     PT Stop Time: 1038  PT Total Time (min): 17 min     Billable Minutes: Gait Training 17    Treatment Type: Treatment  PT/PTA: PTA     PTA Visit Number: 4       Estelle Ramos PTA.  Pager [phone]    3/8/2019    .

## 2019-03-11 ENCOUNTER — NURSE TRIAGE (OUTPATIENT)
Dept: ADMINISTRATIVE | Facility: CLINIC | Age: 79
End: 2019-03-11

## 2019-03-12 ENCOUNTER — TELEPHONE (OUTPATIENT)
Dept: INTERNAL MEDICINE | Facility: CLINIC | Age: 79
End: 2019-03-12

## 2019-03-12 NOTE — PLAN OF CARE
3/12/19 2:40pm   CM received vm from pt's daughter, Ashley Willis,  [phone], requesting return call.  CM TL called Ms Willis back and was informed pt has not received her BIPAP yet, that [facility] has stated they need additional documentation.    3/12/19 2:55pm  CM TL called [facility], [phone], spoke with Leonardo, obtained fax # [phone] will send face to face HH orders and MD progress note 3/7/19.    Requested Leonardo to call me back if there was any additional documentation needed.

## 2019-03-12 NOTE — TELEPHONE ENCOUNTER
Daughter called to report the following:     -needs order for BIPAP   -pt is having trouble getting BIPAP  -needs signature for [facility]     Reason for Disposition   [1] Caller requests to speak ONLY to PCP AND [2] NON-URGENT question    Protocols used: PCP CALL - NO TRIAGE-A-AH

## 2019-03-12 NOTE — TELEPHONE ENCOUNTER
I spoke with Chago Marina from [facility] regarding the BiPAP that I ordered for Ms. Leija on discharge.      Patient requires CPAP to be switched to BiPAP, as she has a recent diagnosis of severe hypertrophic cardiomyopathy compounding previously known lung pathologies (including but not limited to COPD, known chronic PEs, squamous cell CA, previous radiation therapy) that prevent the CPAP from adequately addressing her obstructive sleep apnea.  Additionally,  patient is retaining CO2 in her sleep and requires the BiPAP for more adequate gas exchange.     Ultimately, Ms. Leija requires the Bipap for better gas exchange, to reduce the number of COPD exacerbations she experiences, and hopefully increase her life expectancy.    Cristy Verdin MD  Internal Medicine, PGY3  Pager [phone]

## 2019-03-12 NOTE — PT/OT/SLP DISCHARGE
Occupational Therapy Discharge Summary    Makenzie SCHWARTZ Leija  MRN: [id number]   Principal Problem: Acute on chronic respiratory failure with hypoxia      Patient Discharged from acute Occupational Therapy on 3/8/19.  Please refer to prior OT note dated 3/8/19 for functional status.    Assessment:      Goals partially met.    Objective:     GOALS:   Multidisciplinary Problems     Occupational Therapy Goals        Problem: Occupational Therapy Goal    Goal Priority Disciplines Outcome Interventions   Occupational Therapy Goal     OT, PT/OT Ongoing (interventions implemented as appropriate)    Description:  Goals to be met by: 3/21/2019    Patient will increase functional independence with ADLs by performing:    Toileting from toilet with Minimal Assistance for hygiene and clothing management. MET 3/1  Stand pivot transfers with Minimal Assistance. Met 2/25  Squat pivot transfers with Minimal Assistance. Met 2/25  Toilet transfer to bedside commode with Contact Guard Assistance. MET 3/1  Bathing from  standing at sink with Minimal Assistance.  LE dressing Supervision with AD to don B socks, pants, and underpants.   (progressing- met socks part of goal with reacher and sock aide)                    Reasons for Discontinuation of Therapy Services  Transfer to alternate level of care.      Plan:     Patient Discharged to: Home with Home Health Service    Rema Horner OT  3/12/2019

## 2019-03-14 NOTE — PT/OT/SLP DISCHARGE
Physical Therapy Discharge Summary    Name: Makenzie Leija  MRN: [id number]   Principal Problem: Acute on chronic respiratory failure with hypoxia     Patient Discharged from acute Physical Therapy on 3/8/2019.  Please refer to prior PT noted date on 3/8/2019 for functional status.     Assessment:     Patient was discharged unexpectedly.  Information required to complete an accurate discharge summary is unknown.  Refer to therapy initial evaluation and last progress note for initial and most recent functional status and goal achievement.  Recommendations made may be found in medical record.    Objective:     GOALS:   Multidisciplinary Problems     Physical Therapy Goals        Problem: Physical Therapy Goal    Goal Priority Disciplines Outcome Goal Variances Interventions   Physical Therapy Goal     PT, PT/OT Ongoing (interventions implemented as appropriate)     Description:  Goals to be met by: 3/20/2019     Patient will increase functional independence with mobility by performin. Supine to sit with Set-up [place]  2. Sit to supine with Set-up [place]  3. Sit to stand transfer with Contact Guard Assistance - Met       Revised: Sit to stand transfer with Supervision - Not Met  4. Bed to chair transfer with Contact Guard Assistance using Rolling Walker - Met      Revised: Bed to chair transfer with Supervision using Rolling Walker - Not Met  5. Gait  x 75 feet with Contact Guard Assistance using Rolling Walker. - Met      Revised: Gait  x 200 feet with Supervision using Rolling Walker. - Not Met                        Reasons for Discontinuation of Therapy Services  Transfer to alternate level of care.      Plan:     Patient Discharged to: Home with Home Health Service.    Rafi Sommers, PT  3/14/2019

## 2019-03-20 NOTE — [employer] PATIENT INSTRUCTIONS
Discharge Summary/Instructions for after Video Capsule Endoscopy  Patient Name: Makenzie Leija  Patient MRN: [id number]  Patient YOB: 1940  Thursday, March 07, 2019  Manolo Verdin MD  This is a 78 year old female.  1.  Do Not eat or drink anything for 1 hour.  Try sips of water first.  If   tolerated, resume your regular diet or one recommended by your physician.  2.  Do not drive, operate machinery, make critical decisions, or do   activities that require coordination or balance for 24 hours.  3.   You may experience a sore throat for 24 to 48 hours.  You may use   throat lozenges or gargle with warm salt water to relieve the discomfort.  4.  Because air was put into your stomach during the procedure, you may   experience some belching.  5.  Do not use any medication containing aspirin for 10 days.  6.  Go directly to the emergency room if you notice any of the following:   Chills and/or fever over 101 F   Persistent vomiting or vomiting with blood/nasal regurgitation   Severe abdominal pain, other than gas cramps   Severe chest pain   Black, tarry stools     Your doctor recommends these additional instructions:  None  If you have any questions or problems, please call your physician.  EMERGENCY PHONE NUMBER: [phone]  LAB RESULTS: [phone]  Manolo Verdin MD  3/20/2019 4:47:31 PM  This report has been verified and signed electronically.  [employer]

## 2019-06-03 NOTE — PLAN OF CARE
Problem: Adult Inpatient Plan of Care  Goal: Plan of Care Review  Pt free of falls and injury. Pt AAOx4. Denies pain during shift.Pt bradycardic , HR 50 manually, /50 manually, Medicine team O notified, EKG and Cardiac orders placed.Morning labs collected,diabetes education provided,bed low, breaks locked, SR up x2, call light within reach, will continue to monitor.       no ulcers

## 2023-02-08 NOTE — PLAN OF CARE
Problem: Adult Inpatient Plan of Care  Goal: Plan of Care Review  Outcome: Ongoing (interventions implemented as appropriate)   02/24/19 0602   Plan of Care Review   Plan of Care Reviewed With patient;son   Pt remains free from falls and injuries during shift. Awake, alert, and oriented x 4. Vital signs stable. Oxygen levels WNL on nasal cannula. Bipap qhs. PRN medication given for pain and anxiety. APTT theraputic x2 last one noted 49.4. No signs of acute distress noted at this time. Bed in lowest position, wheels locked, call light in reach. Son at the bedside, will continue to monitor. Safety maintained.        Deep Sutures: 5-0 Vicryl